# Patient Record
Sex: MALE | Race: ASIAN | NOT HISPANIC OR LATINO | Employment: UNEMPLOYED | URBAN - METROPOLITAN AREA
[De-identification: names, ages, dates, MRNs, and addresses within clinical notes are randomized per-mention and may not be internally consistent; named-entity substitution may affect disease eponyms.]

---

## 2017-04-05 ENCOUNTER — COMMUNICATION - HEALTHEAST (OUTPATIENT)
Dept: FAMILY MEDICINE | Facility: CLINIC | Age: 57
End: 2017-04-05

## 2017-04-05 DIAGNOSIS — I10 ESSENTIAL HYPERTENSION, BENIGN: ICD-10-CM

## 2017-05-04 ENCOUNTER — COMMUNICATION - HEALTHEAST (OUTPATIENT)
Dept: FAMILY MEDICINE | Facility: CLINIC | Age: 57
End: 2017-05-04

## 2017-05-04 DIAGNOSIS — M25.50 PAIN IN JOINT, MULTIPLE SITES: ICD-10-CM

## 2017-05-04 DIAGNOSIS — E55.9 UNSPECIFIED VITAMIN D DEFICIENCY: ICD-10-CM

## 2017-05-04 DIAGNOSIS — I10 ESSENTIAL HYPERTENSION, BENIGN: ICD-10-CM

## 2017-05-04 DIAGNOSIS — M54.9 BACKACHE: ICD-10-CM

## 2017-05-30 ENCOUNTER — OFFICE VISIT - HEALTHEAST (OUTPATIENT)
Dept: FAMILY MEDICINE | Facility: CLINIC | Age: 57
End: 2017-05-30

## 2017-05-30 DIAGNOSIS — G89.29: ICD-10-CM

## 2017-05-30 DIAGNOSIS — Z12.11 SCREENING FOR MALIGNANT NEOPLASM OF COLON: ICD-10-CM

## 2017-05-30 DIAGNOSIS — R73.9 HYPERGLYCEMIA: ICD-10-CM

## 2017-05-30 DIAGNOSIS — I10 ESSENTIAL HYPERTENSION, BENIGN: ICD-10-CM

## 2017-05-30 DIAGNOSIS — M79.606: ICD-10-CM

## 2017-05-30 LAB — HBA1C MFR BLD: 6.4 % (ref 3.5–6)

## 2017-05-30 ASSESSMENT — MIFFLIN-ST. JEOR: SCORE: 1493.92

## 2017-06-05 ENCOUNTER — COMMUNICATION - HEALTHEAST (OUTPATIENT)
Dept: FAMILY MEDICINE | Facility: CLINIC | Age: 57
End: 2017-06-05

## 2017-07-03 ENCOUNTER — COMMUNICATION - HEALTHEAST (OUTPATIENT)
Dept: FAMILY MEDICINE | Facility: CLINIC | Age: 57
End: 2017-07-03

## 2017-07-03 DIAGNOSIS — M79.609 PAIN IN LIMB: ICD-10-CM

## 2017-08-07 ENCOUNTER — COMMUNICATION - HEALTHEAST (OUTPATIENT)
Dept: FAMILY MEDICINE | Facility: CLINIC | Age: 57
End: 2017-08-07

## 2017-08-07 DIAGNOSIS — M79.609 PAIN IN LIMB: ICD-10-CM

## 2017-09-13 ENCOUNTER — OFFICE VISIT - HEALTHEAST (OUTPATIENT)
Dept: FAMILY MEDICINE | Facility: CLINIC | Age: 57
End: 2017-09-13

## 2017-09-13 DIAGNOSIS — M79.609 PAIN IN LIMB: ICD-10-CM

## 2017-09-13 DIAGNOSIS — I10 ESSENTIAL HYPERTENSION, BENIGN: ICD-10-CM

## 2017-09-13 DIAGNOSIS — M54.9 BACKACHE: ICD-10-CM

## 2017-09-13 DIAGNOSIS — E55.9 UNSPECIFIED VITAMIN D DEFICIENCY: ICD-10-CM

## 2017-09-13 DIAGNOSIS — M25.50 PAIN IN JOINT, MULTIPLE SITES: ICD-10-CM

## 2017-11-07 ENCOUNTER — COMMUNICATION - HEALTHEAST (OUTPATIENT)
Dept: FAMILY MEDICINE | Facility: CLINIC | Age: 57
End: 2017-11-07

## 2017-11-07 DIAGNOSIS — M54.9 BACKACHE: ICD-10-CM

## 2017-11-07 DIAGNOSIS — E55.9 VITAMIN D DEFICIENCY: ICD-10-CM

## 2017-11-07 DIAGNOSIS — M25.50 PAIN IN JOINT, MULTIPLE SITES: ICD-10-CM

## 2017-11-29 ENCOUNTER — OFFICE VISIT - HEALTHEAST (OUTPATIENT)
Dept: FAMILY MEDICINE | Facility: CLINIC | Age: 57
End: 2017-11-29

## 2017-11-29 DIAGNOSIS — E55.9 VITAMIN D DEFICIENCY: ICD-10-CM

## 2017-11-29 DIAGNOSIS — M79.605 CHRONIC PAIN OF LEFT LOWER EXTREMITY: ICD-10-CM

## 2017-11-29 DIAGNOSIS — G89.29 CHRONIC PAIN OF LEFT LOWER EXTREMITY: ICD-10-CM

## 2017-11-29 DIAGNOSIS — M25.50 PAIN IN JOINT, MULTIPLE SITES: ICD-10-CM

## 2017-11-29 DIAGNOSIS — I10 ESSENTIAL HYPERTENSION, BENIGN: ICD-10-CM

## 2017-11-29 DIAGNOSIS — Z23 NEED FOR IMMUNIZATION AGAINST INFLUENZA: ICD-10-CM

## 2018-01-15 ENCOUNTER — OFFICE VISIT - HEALTHEAST (OUTPATIENT)
Dept: FAMILY MEDICINE | Facility: CLINIC | Age: 58
End: 2018-01-15

## 2018-01-15 DIAGNOSIS — I10 ESSENTIAL HYPERTENSION, BENIGN: ICD-10-CM

## 2018-01-15 DIAGNOSIS — F79 INTELLECTUAL DISABILITY: ICD-10-CM

## 2018-01-15 DIAGNOSIS — Z87.820 HISTORY OF CLOSED HEAD INJURY: ICD-10-CM

## 2018-06-22 ENCOUNTER — OFFICE VISIT - HEALTHEAST (OUTPATIENT)
Dept: FAMILY MEDICINE | Facility: CLINIC | Age: 58
End: 2018-06-22

## 2018-06-22 DIAGNOSIS — R00.0 TACHYCARDIA: ICD-10-CM

## 2018-06-22 DIAGNOSIS — R06.00 DYSPNEA: ICD-10-CM

## 2018-06-22 DIAGNOSIS — R07.9 ACUTE CHEST PAIN: ICD-10-CM

## 2018-06-22 DIAGNOSIS — M54.9 BACK PAIN: ICD-10-CM

## 2018-06-22 LAB
ATRIAL RATE - MUSE: 123 BPM
DIASTOLIC BLOOD PRESSURE - MUSE: NORMAL MMHG
INTERPRETATION ECG - MUSE: NORMAL
P AXIS - MUSE: 37 DEGREES
PR INTERVAL - MUSE: 156 MS
QRS DURATION - MUSE: 84 MS
QT - MUSE: 298 MS
QTC - MUSE: 426 MS
R AXIS - MUSE: 44 DEGREES
SYSTOLIC BLOOD PRESSURE - MUSE: NORMAL MMHG
T AXIS - MUSE: -10 DEGREES
VENTRICULAR RATE- MUSE: 123 BPM

## 2018-06-22 ASSESSMENT — MIFFLIN-ST. JEOR: SCORE: 1341.51

## 2018-06-23 ASSESSMENT — MIFFLIN-ST. JEOR: SCORE: 1462.62

## 2018-06-24 ASSESSMENT — MIFFLIN-ST. JEOR: SCORE: 1497.1

## 2018-06-25 ASSESSMENT — MIFFLIN-ST. JEOR: SCORE: 1495.28

## 2018-06-26 ENCOUNTER — ANESTHESIA - HEALTHEAST (OUTPATIENT)
Dept: SURGERY | Facility: HOSPITAL | Age: 58
End: 2018-06-26

## 2018-06-26 ENCOUNTER — SURGERY - HEALTHEAST (OUTPATIENT)
Dept: SURGERY | Facility: HOSPITAL | Age: 58
End: 2018-06-26

## 2018-06-26 ASSESSMENT — MIFFLIN-ST. JEOR: SCORE: 1487.12

## 2018-06-27 ASSESSMENT — MIFFLIN-ST. JEOR: SCORE: 1480.32

## 2018-06-28 ENCOUNTER — COMMUNICATION - HEALTHEAST (OUTPATIENT)
Dept: FAMILY MEDICINE | Facility: CLINIC | Age: 58
End: 2018-06-28

## 2018-06-28 ASSESSMENT — MIFFLIN-ST. JEOR: SCORE: 1502.54

## 2018-06-29 ASSESSMENT — MIFFLIN-ST. JEOR: SCORE: 1474.42

## 2018-07-01 ASSESSMENT — MIFFLIN-ST. JEOR: SCORE: 1449.92

## 2018-07-04 ASSESSMENT — MIFFLIN-ST. JEOR: SCORE: 1446.75

## 2018-07-05 ASSESSMENT — MIFFLIN-ST. JEOR: SCORE: 1453.1

## 2018-07-10 ENCOUNTER — COMMUNICATION - HEALTHEAST (OUTPATIENT)
Dept: ADMINISTRATIVE | Facility: CLINIC | Age: 58
End: 2018-07-10

## 2018-07-11 ENCOUNTER — OFFICE VISIT - HEALTHEAST (OUTPATIENT)
Dept: FAMILY MEDICINE | Facility: CLINIC | Age: 58
End: 2018-07-11

## 2018-07-11 DIAGNOSIS — J86.9 EMPYEMA, RIGHT (H): ICD-10-CM

## 2018-07-11 DIAGNOSIS — M79.605 CHRONIC PAIN OF LEFT LOWER EXTREMITY: ICD-10-CM

## 2018-07-11 DIAGNOSIS — G89.29 CHRONIC PAIN OF LEFT LOWER EXTREMITY: ICD-10-CM

## 2018-07-11 DIAGNOSIS — R07.81 PLEURITIC CHEST PAIN: ICD-10-CM

## 2018-07-11 DIAGNOSIS — E11.8 TYPE 2 DIABETES MELLITUS WITH COMPLICATION, WITHOUT LONG-TERM CURRENT USE OF INSULIN (H): ICD-10-CM

## 2018-07-11 DIAGNOSIS — D50.9 MICROCYTIC ANEMIA: ICD-10-CM

## 2018-07-18 ENCOUNTER — RECORDS - HEALTHEAST (OUTPATIENT)
Dept: ADMINISTRATIVE | Facility: OTHER | Age: 58
End: 2018-07-18

## 2018-08-02 ENCOUNTER — OFFICE VISIT - HEALTHEAST (OUTPATIENT)
Dept: INFECTIOUS DISEASES | Facility: CLINIC | Age: 58
End: 2018-08-02

## 2018-08-02 DIAGNOSIS — J86.9 EMPYEMA (H): ICD-10-CM

## 2018-08-02 LAB
BASOPHILS # BLD AUTO: 0 THOU/UL (ref 0–0.2)
BASOPHILS NFR BLD AUTO: 0 % (ref 0–2)
EOSINOPHIL # BLD AUTO: 0.1 THOU/UL (ref 0–0.4)
EOSINOPHIL NFR BLD AUTO: 0 % (ref 0–6)
ERYTHROCYTE [DISTWIDTH] IN BLOOD BY AUTOMATED COUNT: 20.6 % (ref 11–14.5)
HCT VFR BLD AUTO: 39.6 % (ref 40–54)
HGB BLD-MCNC: 12.1 G/DL (ref 14–18)
LYMPHOCYTES # BLD AUTO: 1.9 THOU/UL (ref 0.8–4.4)
LYMPHOCYTES NFR BLD AUTO: 15 % (ref 20–40)
MCH RBC QN AUTO: 22.1 PG (ref 27–34)
MCHC RBC AUTO-ENTMCNC: 30.6 G/DL (ref 32–36)
MCV RBC AUTO: 72 FL (ref 80–100)
MONOCYTES # BLD AUTO: 1.2 THOU/UL (ref 0–0.9)
MONOCYTES NFR BLD AUTO: 10 % (ref 2–10)
NEUTROPHILS # BLD AUTO: 9.3 THOU/UL (ref 2–7.7)
NEUTROPHILS NFR BLD AUTO: 75 % (ref 50–70)
PLAT MORPH BLD: NORMAL
PLATELET # BLD AUTO: 203 THOU/UL (ref 140–440)
PMV BLD AUTO: ABNORMAL FL (ref 8.5–12.5)
POLYCHROMASIA BLD QL SMEAR: ABNORMAL
RBC # BLD AUTO: 5.47 MILL/UL (ref 4.4–6.2)
WBC: 12.5 THOU/UL (ref 4–11)

## 2018-08-22 ENCOUNTER — OFFICE VISIT - HEALTHEAST (OUTPATIENT)
Dept: FAMILY MEDICINE | Facility: CLINIC | Age: 58
End: 2018-08-22

## 2018-08-22 DIAGNOSIS — M25.50 PAIN IN JOINT, MULTIPLE SITES: ICD-10-CM

## 2018-08-22 DIAGNOSIS — J86.9 EMPYEMA, RIGHT (H): ICD-10-CM

## 2018-08-22 DIAGNOSIS — E11.8 TYPE 2 DIABETES MELLITUS WITH COMPLICATION, WITHOUT LONG-TERM CURRENT USE OF INSULIN (H): ICD-10-CM

## 2018-08-22 DIAGNOSIS — D62 ANEMIA DUE TO BLOOD LOSS, ACUTE: ICD-10-CM

## 2018-08-22 DIAGNOSIS — Z12.11 SCREENING FOR COLON CANCER: ICD-10-CM

## 2018-08-22 DIAGNOSIS — M79.605 CHRONIC PAIN OF LEFT LOWER EXTREMITY: ICD-10-CM

## 2018-08-22 DIAGNOSIS — I10 ESSENTIAL HYPERTENSION, BENIGN: ICD-10-CM

## 2018-08-22 DIAGNOSIS — G89.29 CHRONIC PAIN OF LEFT LOWER EXTREMITY: ICD-10-CM

## 2018-08-22 ASSESSMENT — MIFFLIN-ST. JEOR: SCORE: 1425.88

## 2018-10-22 ENCOUNTER — OFFICE VISIT - HEALTHEAST (OUTPATIENT)
Dept: FAMILY MEDICINE | Facility: CLINIC | Age: 58
End: 2018-10-22

## 2018-10-22 DIAGNOSIS — G54.6 PHANTOM LIMB PAIN (H): ICD-10-CM

## 2018-10-22 DIAGNOSIS — D50.9 MICROCYTIC ANEMIA: ICD-10-CM

## 2018-10-22 DIAGNOSIS — Z23 NEED FOR IMMUNIZATION AGAINST INFLUENZA: ICD-10-CM

## 2018-10-22 DIAGNOSIS — E11.8 TYPE 2 DIABETES MELLITUS WITH COMPLICATION, WITHOUT LONG-TERM CURRENT USE OF INSULIN (H): ICD-10-CM

## 2018-10-22 DIAGNOSIS — I10 ESSENTIAL HYPERTENSION, BENIGN: ICD-10-CM

## 2018-10-22 LAB
ERYTHROCYTE [DISTWIDTH] IN BLOOD BY AUTOMATED COUNT: 18.7 % (ref 11–14.5)
HBA1C MFR BLD: 6.6 % (ref 3.5–6)
HCT VFR BLD AUTO: 44.1 % (ref 40–54)
HGB BLD-MCNC: 13.2 G/DL (ref 14–18)
MCH RBC QN AUTO: 22.2 PG (ref 27–34)
MCHC RBC AUTO-ENTMCNC: 29.9 G/DL (ref 32–36)
MCV RBC AUTO: 74 FL (ref 80–100)
PLATELET # BLD AUTO: 133 THOU/UL (ref 140–440)
RBC # BLD AUTO: 5.94 MILL/UL (ref 4.4–6.2)
WBC: 8.1 THOU/UL (ref 4–11)

## 2018-12-01 ENCOUNTER — COMMUNICATION - HEALTHEAST (OUTPATIENT)
Dept: FAMILY MEDICINE | Facility: CLINIC | Age: 58
End: 2018-12-01

## 2018-12-01 DIAGNOSIS — I10 ESSENTIAL HYPERTENSION, BENIGN: ICD-10-CM

## 2018-12-01 DIAGNOSIS — G89.29 CHRONIC PAIN OF LEFT LOWER EXTREMITY: ICD-10-CM

## 2018-12-01 DIAGNOSIS — M79.605 CHRONIC PAIN OF LEFT LOWER EXTREMITY: ICD-10-CM

## 2018-12-01 DIAGNOSIS — M25.50 PAIN IN JOINT, MULTIPLE SITES: ICD-10-CM

## 2018-12-04 ENCOUNTER — OFFICE VISIT - HEALTHEAST (OUTPATIENT)
Dept: FAMILY MEDICINE | Facility: CLINIC | Age: 58
End: 2018-12-04

## 2018-12-04 DIAGNOSIS — M79.605 CHRONIC PAIN OF LEFT LOWER EXTREMITY: ICD-10-CM

## 2018-12-04 DIAGNOSIS — M25.50 PAIN IN JOINT, MULTIPLE SITES: ICD-10-CM

## 2018-12-04 DIAGNOSIS — E11.8 TYPE 2 DIABETES MELLITUS WITH COMPLICATION, WITHOUT LONG-TERM CURRENT USE OF INSULIN (H): ICD-10-CM

## 2018-12-04 DIAGNOSIS — R07.9 RIGHT-SIDED CHEST PAIN: ICD-10-CM

## 2018-12-04 DIAGNOSIS — G89.29 CHRONIC PAIN OF LEFT LOWER EXTREMITY: ICD-10-CM

## 2018-12-04 DIAGNOSIS — E55.9 VITAMIN D DEFICIENCY: ICD-10-CM

## 2018-12-04 DIAGNOSIS — I10 ESSENTIAL HYPERTENSION, BENIGN: ICD-10-CM

## 2018-12-04 ASSESSMENT — MIFFLIN-ST. JEOR: SCORE: 1466.71

## 2019-02-19 ENCOUNTER — RECORDS - HEALTHEAST (OUTPATIENT)
Dept: ADMINISTRATIVE | Facility: OTHER | Age: 59
End: 2019-02-19

## 2019-03-01 ENCOUNTER — RECORDS - HEALTHEAST (OUTPATIENT)
Dept: HEALTH INFORMATION MANAGEMENT | Facility: CLINIC | Age: 59
End: 2019-03-01

## 2019-04-08 ENCOUNTER — OFFICE VISIT - HEALTHEAST (OUTPATIENT)
Dept: FAMILY MEDICINE | Facility: CLINIC | Age: 59
End: 2019-04-08

## 2019-04-08 DIAGNOSIS — E55.9 VITAMIN D DEFICIENCY: ICD-10-CM

## 2019-04-08 DIAGNOSIS — I10 ESSENTIAL HYPERTENSION, BENIGN: ICD-10-CM

## 2019-04-08 DIAGNOSIS — L02.11 CUTANEOUS ABSCESS OF NECK: ICD-10-CM

## 2019-04-08 DIAGNOSIS — E11.8 TYPE 2 DIABETES MELLITUS WITH COMPLICATION, WITHOUT LONG-TERM CURRENT USE OF INSULIN (H): ICD-10-CM

## 2019-04-08 LAB
ANION GAP SERPL CALCULATED.3IONS-SCNC: 13 MMOL/L (ref 5–18)
BUN SERPL-MCNC: 12 MG/DL (ref 8–22)
CALCIUM SERPL-MCNC: 9.8 MG/DL (ref 8.5–10.5)
CHLORIDE BLD-SCNC: 106 MMOL/L (ref 98–107)
CO2 SERPL-SCNC: 23 MMOL/L (ref 22–31)
CREAT SERPL-MCNC: 1.08 MG/DL (ref 0.7–1.3)
GFR SERPL CREATININE-BSD FRML MDRD: >60 ML/MIN/1.73M2
GLUCOSE BLD-MCNC: 132 MG/DL (ref 70–125)
HBA1C MFR BLD: 6.1 % (ref 3.5–6)
POTASSIUM BLD-SCNC: 4.1 MMOL/L (ref 3.5–5)
SODIUM SERPL-SCNC: 142 MMOL/L (ref 136–145)

## 2019-05-08 ENCOUNTER — COMMUNICATION - HEALTHEAST (OUTPATIENT)
Dept: FAMILY MEDICINE | Facility: CLINIC | Age: 59
End: 2019-05-08

## 2019-05-08 DIAGNOSIS — G89.29 CHRONIC PAIN OF LEFT LOWER EXTREMITY: ICD-10-CM

## 2019-05-08 DIAGNOSIS — M25.50 PAIN IN JOINT, MULTIPLE SITES: ICD-10-CM

## 2019-05-08 DIAGNOSIS — M79.605 CHRONIC PAIN OF LEFT LOWER EXTREMITY: ICD-10-CM

## 2019-06-11 ENCOUNTER — OFFICE VISIT - HEALTHEAST (OUTPATIENT)
Dept: FAMILY MEDICINE | Facility: CLINIC | Age: 59
End: 2019-06-11

## 2019-06-11 DIAGNOSIS — G54.6 PHANTOM LIMB PAIN (H): ICD-10-CM

## 2019-06-11 DIAGNOSIS — E11.8 TYPE 2 DIABETES MELLITUS WITH COMPLICATION, WITHOUT LONG-TERM CURRENT USE OF INSULIN (H): ICD-10-CM

## 2019-06-11 DIAGNOSIS — I10 ESSENTIAL HYPERTENSION, BENIGN: ICD-10-CM

## 2019-06-11 ASSESSMENT — MIFFLIN-ST. JEOR: SCORE: 1480.32

## 2019-07-09 ENCOUNTER — OFFICE VISIT - HEALTHEAST (OUTPATIENT)
Dept: FAMILY MEDICINE | Facility: CLINIC | Age: 59
End: 2019-07-09

## 2019-07-09 DIAGNOSIS — I10 ESSENTIAL HYPERTENSION, BENIGN: ICD-10-CM

## 2019-07-09 LAB
CREAT UR-MCNC: 9.6 MG/DL
MICROALBUMIN UR-MCNC: <0.5 MG/DL (ref 0–1.99)
MICROALBUMIN/CREAT UR: NORMAL MG/G

## 2019-07-30 ENCOUNTER — OFFICE VISIT - HEALTHEAST (OUTPATIENT)
Dept: FAMILY MEDICINE | Facility: CLINIC | Age: 59
End: 2019-07-30

## 2019-07-30 DIAGNOSIS — E11.8 TYPE 2 DIABETES MELLITUS WITH COMPLICATION, WITHOUT LONG-TERM CURRENT USE OF INSULIN (H): ICD-10-CM

## 2019-07-30 DIAGNOSIS — I10 ESSENTIAL HYPERTENSION, BENIGN: ICD-10-CM

## 2019-07-30 DIAGNOSIS — I10 UNCONTROLLED HYPERTENSION: ICD-10-CM

## 2019-07-30 LAB
ANION GAP SERPL CALCULATED.3IONS-SCNC: 10 MMOL/L (ref 5–18)
BUN SERPL-MCNC: 8 MG/DL (ref 8–22)
CALCIUM SERPL-MCNC: 9.7 MG/DL (ref 8.5–10.5)
CHLORIDE BLD-SCNC: 107 MMOL/L (ref 98–107)
CO2 SERPL-SCNC: 25 MMOL/L (ref 22–31)
CREAT SERPL-MCNC: 0.86 MG/DL (ref 0.7–1.3)
GFR SERPL CREATININE-BSD FRML MDRD: >60 ML/MIN/1.73M2
GLUCOSE BLD-MCNC: 102 MG/DL (ref 70–125)
POTASSIUM BLD-SCNC: 3.8 MMOL/L (ref 3.5–5)
SODIUM SERPL-SCNC: 142 MMOL/L (ref 136–145)

## 2019-07-31 LAB — HBA1C MFR BLD: 6 % (ref 3.5–6)

## 2019-08-02 LAB
ANNOTATION COMMENT IMP: NORMAL
METANEPHS SERPL-SCNC: 0.2 NMOL/L (ref 0–0.49)
NORMETANEPHRINE SERPL-SCNC: 0.73 NMOL/L (ref 0–0.89)

## 2019-08-14 ENCOUNTER — RECORDS - HEALTHEAST (OUTPATIENT)
Dept: ADMINISTRATIVE | Facility: OTHER | Age: 59
End: 2019-08-14

## 2019-08-15 ENCOUNTER — OFFICE VISIT - HEALTHEAST (OUTPATIENT)
Dept: FAMILY MEDICINE | Facility: CLINIC | Age: 59
End: 2019-08-15

## 2019-08-15 DIAGNOSIS — I10 UNCONTROLLED HYPERTENSION: ICD-10-CM

## 2019-08-15 ASSESSMENT — MIFFLIN-ST. JEOR: SCORE: 1490.52

## 2019-09-09 ENCOUNTER — COMMUNICATION - HEALTHEAST (OUTPATIENT)
Dept: FAMILY MEDICINE | Facility: CLINIC | Age: 59
End: 2019-09-09

## 2019-09-09 DIAGNOSIS — G89.29 CHRONIC PAIN OF LEFT LOWER EXTREMITY: ICD-10-CM

## 2019-09-09 DIAGNOSIS — M79.605 CHRONIC PAIN OF LEFT LOWER EXTREMITY: ICD-10-CM

## 2019-09-09 DIAGNOSIS — M25.50 PAIN IN JOINT, MULTIPLE SITES: ICD-10-CM

## 2019-09-26 ENCOUNTER — OFFICE VISIT - HEALTHEAST (OUTPATIENT)
Dept: FAMILY MEDICINE | Facility: CLINIC | Age: 59
End: 2019-09-26

## 2019-09-26 DIAGNOSIS — E55.9 VITAMIN D DEFICIENCY: ICD-10-CM

## 2019-09-26 DIAGNOSIS — Z23 NEED FOR IMMUNIZATION AGAINST INFLUENZA: ICD-10-CM

## 2019-09-26 DIAGNOSIS — I10 ESSENTIAL HYPERTENSION, BENIGN: ICD-10-CM

## 2019-09-26 DIAGNOSIS — E11.8 TYPE 2 DIABETES MELLITUS WITH COMPLICATION, WITHOUT LONG-TERM CURRENT USE OF INSULIN (H): ICD-10-CM

## 2019-09-26 ASSESSMENT — MIFFLIN-ST. JEOR: SCORE: 1480.32

## 2019-11-05 ENCOUNTER — OFFICE VISIT - HEALTHEAST (OUTPATIENT)
Dept: FAMILY MEDICINE | Facility: CLINIC | Age: 59
End: 2019-11-05

## 2019-11-05 DIAGNOSIS — I1A.0 RESISTANT HYPERTENSION: ICD-10-CM

## 2019-11-13 ENCOUNTER — COMMUNICATION - HEALTHEAST (OUTPATIENT)
Dept: FAMILY MEDICINE | Facility: CLINIC | Age: 59
End: 2019-11-13

## 2019-12-05 ENCOUNTER — OFFICE VISIT - HEALTHEAST (OUTPATIENT)
Dept: FAMILY MEDICINE | Facility: CLINIC | Age: 59
End: 2019-12-05

## 2019-12-05 DIAGNOSIS — I1A.0 RESISTANT HYPERTENSION: ICD-10-CM

## 2019-12-05 DIAGNOSIS — M25.50 PAIN IN JOINT, MULTIPLE SITES: ICD-10-CM

## 2019-12-05 DIAGNOSIS — G89.29 CHRONIC PAIN OF LEFT LOWER EXTREMITY: ICD-10-CM

## 2019-12-05 DIAGNOSIS — M79.605 CHRONIC PAIN OF LEFT LOWER EXTREMITY: ICD-10-CM

## 2019-12-05 ASSESSMENT — MIFFLIN-ST. JEOR: SCORE: 1505.26

## 2019-12-12 ENCOUNTER — OFFICE VISIT - HEALTHEAST (OUTPATIENT)
Dept: FAMILY MEDICINE | Facility: CLINIC | Age: 59
End: 2019-12-12

## 2019-12-12 DIAGNOSIS — I1A.0 RESISTANT HYPERTENSION: ICD-10-CM

## 2019-12-12 ASSESSMENT — MIFFLIN-ST. JEOR: SCORE: 1502.99

## 2019-12-17 ENCOUNTER — OFFICE VISIT - HEALTHEAST (OUTPATIENT)
Dept: FAMILY MEDICINE | Facility: CLINIC | Age: 59
End: 2019-12-17

## 2019-12-17 DIAGNOSIS — R00.0 SINUS TACHYCARDIA: ICD-10-CM

## 2019-12-17 DIAGNOSIS — I1A.0 RESISTANT HYPERTENSION: ICD-10-CM

## 2019-12-17 DIAGNOSIS — E11.9 DIABETES MELLITUS, TYPE 2 (H): ICD-10-CM

## 2019-12-17 LAB — HBA1C MFR BLD: 6.2 % (ref 3.5–6)

## 2019-12-19 LAB
ANNOTATION COMMENT IMP: ABNORMAL
METANEPHS SERPL-SCNC: 0.45 NMOL/L (ref 0–0.49)
NORMETANEPHRINE SERPL-SCNC: 1.82 NMOL/L (ref 0–0.89)

## 2020-03-18 ENCOUNTER — COMMUNICATION - HEALTHEAST (OUTPATIENT)
Dept: FAMILY MEDICINE | Facility: CLINIC | Age: 60
End: 2020-03-18

## 2020-03-19 ENCOUNTER — COMMUNICATION - HEALTHEAST (OUTPATIENT)
Dept: FAMILY MEDICINE | Facility: CLINIC | Age: 60
End: 2020-03-19

## 2020-03-19 DIAGNOSIS — G89.29 CHRONIC PAIN OF LEFT LOWER EXTREMITY: ICD-10-CM

## 2020-03-19 DIAGNOSIS — M25.50 PAIN IN JOINT, MULTIPLE SITES: ICD-10-CM

## 2020-03-19 DIAGNOSIS — M79.605 CHRONIC PAIN OF LEFT LOWER EXTREMITY: ICD-10-CM

## 2020-03-25 ENCOUNTER — COMMUNICATION - HEALTHEAST (OUTPATIENT)
Dept: FAMILY MEDICINE | Facility: CLINIC | Age: 60
End: 2020-03-25

## 2020-03-25 DIAGNOSIS — G89.29 CHRONIC PAIN OF LEFT LOWER EXTREMITY: ICD-10-CM

## 2020-03-25 DIAGNOSIS — M79.605 CHRONIC PAIN OF LEFT LOWER EXTREMITY: ICD-10-CM

## 2020-03-25 DIAGNOSIS — M25.50 PAIN IN JOINT, MULTIPLE SITES: ICD-10-CM

## 2020-03-25 RX ORDER — ACETAMINOPHEN 500 MG
500 TABLET ORAL EVERY 6 HOURS PRN
Qty: 100 TABLET | Refills: 11 | Status: SHIPPED | OUTPATIENT
Start: 2020-03-25 | End: 2024-02-12

## 2020-05-06 ENCOUNTER — COMMUNICATION - HEALTHEAST (OUTPATIENT)
Dept: FAMILY MEDICINE | Facility: CLINIC | Age: 60
End: 2020-05-06

## 2020-05-06 DIAGNOSIS — I10 ESSENTIAL HYPERTENSION, BENIGN: ICD-10-CM

## 2020-05-11 ENCOUNTER — COMMUNICATION - HEALTHEAST (OUTPATIENT)
Dept: FAMILY MEDICINE | Facility: CLINIC | Age: 60
End: 2020-05-11

## 2020-05-27 ENCOUNTER — OFFICE VISIT - HEALTHEAST (OUTPATIENT)
Dept: FAMILY MEDICINE | Facility: CLINIC | Age: 60
End: 2020-05-27

## 2020-05-27 DIAGNOSIS — I1A.0 RESISTANT HYPERTENSION: ICD-10-CM

## 2020-05-27 DIAGNOSIS — E55.9 VITAMIN D DEFICIENCY: ICD-10-CM

## 2020-05-27 DIAGNOSIS — M79.605 CHRONIC PAIN OF LEFT LOWER EXTREMITY: ICD-10-CM

## 2020-05-27 DIAGNOSIS — I10 ESSENTIAL HYPERTENSION, BENIGN: ICD-10-CM

## 2020-05-27 DIAGNOSIS — R00.0 SINUS TACHYCARDIA: ICD-10-CM

## 2020-05-27 DIAGNOSIS — M25.50 PAIN IN JOINT, MULTIPLE SITES: ICD-10-CM

## 2020-05-27 DIAGNOSIS — G89.29 CHRONIC PAIN OF LEFT LOWER EXTREMITY: ICD-10-CM

## 2020-05-27 DIAGNOSIS — E11.8 TYPE 2 DIABETES MELLITUS WITH COMPLICATION, WITHOUT LONG-TERM CURRENT USE OF INSULIN (H): ICD-10-CM

## 2020-05-27 LAB
ALBUMIN SERPL-MCNC: 4.2 G/DL (ref 3.5–5)
ALP SERPL-CCNC: 92 U/L (ref 45–120)
ALT SERPL W P-5'-P-CCNC: 34 U/L (ref 0–45)
ANION GAP SERPL CALCULATED.3IONS-SCNC: 12 MMOL/L (ref 5–18)
AST SERPL W P-5'-P-CCNC: 34 U/L (ref 0–40)
BILIRUB SERPL-MCNC: 0.7 MG/DL (ref 0–1)
BUN SERPL-MCNC: 9 MG/DL (ref 8–22)
CALCIUM SERPL-MCNC: 10.1 MG/DL (ref 8.5–10.5)
CHLORIDE BLD-SCNC: 101 MMOL/L (ref 98–107)
CO2 SERPL-SCNC: 27 MMOL/L (ref 22–31)
CREAT SERPL-MCNC: 1 MG/DL (ref 0.7–1.3)
GFR SERPL CREATININE-BSD FRML MDRD: >60 ML/MIN/1.73M2
GLUCOSE BLD-MCNC: 236 MG/DL (ref 70–125)
HBA1C MFR BLD: 7 % (ref 3.5–6)
LDLC SERPL CALC-MCNC: 253 MG/DL
POTASSIUM BLD-SCNC: 4.5 MMOL/L (ref 3.5–5)
PROT SERPL-MCNC: 8.1 G/DL (ref 6–8)
SODIUM SERPL-SCNC: 140 MMOL/L (ref 136–145)

## 2020-05-27 RX ORDER — AMLODIPINE BESYLATE 10 MG/1
10 TABLET ORAL DAILY
Qty: 90 TABLET | Refills: 3 | Status: SHIPPED | OUTPATIENT
Start: 2020-05-27 | End: 2022-07-11

## 2020-05-27 RX ORDER — GABAPENTIN 300 MG/1
300 CAPSULE ORAL 2 TIMES DAILY
Qty: 180 CAPSULE | Refills: 3 | Status: SHIPPED | OUTPATIENT
Start: 2020-05-27 | End: 2021-09-08

## 2020-05-27 RX ORDER — LOSARTAN POTASSIUM 100 MG/1
100 TABLET ORAL DAILY
Qty: 90 TABLET | Refills: 3 | Status: SHIPPED | OUTPATIENT
Start: 2020-05-27 | End: 2022-07-11

## 2020-05-30 LAB
ANNOTATION COMMENT IMP: ABNORMAL
METANEPHS SERPL-SCNC: 0.32 NMOL/L (ref 0–0.49)
NORMETANEPHRINE SERPL-SCNC: 1.15 NMOL/L (ref 0–0.89)

## 2020-06-03 ENCOUNTER — COMMUNICATION - HEALTHEAST (OUTPATIENT)
Dept: FAMILY MEDICINE | Facility: CLINIC | Age: 60
End: 2020-06-03

## 2020-06-03 ENCOUNTER — AMBULATORY - HEALTHEAST (OUTPATIENT)
Dept: FAMILY MEDICINE | Facility: CLINIC | Age: 60
End: 2020-06-03

## 2020-06-03 DIAGNOSIS — R79.89 ELEVATED PLASMA METANEPHRINES: ICD-10-CM

## 2020-06-30 ENCOUNTER — COMMUNICATION - HEALTHEAST (OUTPATIENT)
Dept: ADMINISTRATIVE | Facility: CLINIC | Age: 60
End: 2020-06-30

## 2020-09-08 ENCOUNTER — COMMUNICATION - HEALTHEAST (OUTPATIENT)
Dept: FAMILY MEDICINE | Facility: CLINIC | Age: 60
End: 2020-09-08

## 2020-12-21 ENCOUNTER — AMBULATORY - HEALTHEAST (OUTPATIENT)
Dept: FAMILY MEDICINE | Facility: CLINIC | Age: 60
End: 2020-12-21

## 2020-12-21 DIAGNOSIS — I1A.0 RESISTANT HYPERTENSION: ICD-10-CM

## 2020-12-22 ENCOUNTER — COMMUNICATION - HEALTHEAST (OUTPATIENT)
Dept: NURSING | Facility: CLINIC | Age: 60
End: 2020-12-22

## 2020-12-23 ENCOUNTER — COMMUNICATION - HEALTHEAST (OUTPATIENT)
Dept: NURSING | Facility: CLINIC | Age: 60
End: 2020-12-23

## 2020-12-24 ENCOUNTER — OFFICE VISIT - HEALTHEAST (OUTPATIENT)
Dept: FAMILY MEDICINE | Facility: CLINIC | Age: 60
End: 2020-12-24

## 2020-12-24 DIAGNOSIS — E78.00 HYPERCHOLESTEREMIA: ICD-10-CM

## 2020-12-24 DIAGNOSIS — R00.0 TACHYCARDIA: ICD-10-CM

## 2020-12-24 DIAGNOSIS — R79.89 ELEVATED PLASMA METANEPHRINES: ICD-10-CM

## 2020-12-24 DIAGNOSIS — E11.8 TYPE 2 DIABETES MELLITUS WITH COMPLICATION, WITHOUT LONG-TERM CURRENT USE OF INSULIN (H): ICD-10-CM

## 2020-12-24 DIAGNOSIS — I1A.0 RESISTANT HYPERTENSION: ICD-10-CM

## 2020-12-24 LAB
ANION GAP SERPL CALCULATED.3IONS-SCNC: 12 MMOL/L (ref 5–18)
BUN SERPL-MCNC: 11 MG/DL (ref 8–22)
CALCIUM SERPL-MCNC: 9.6 MG/DL (ref 8.5–10.5)
CHLORIDE BLD-SCNC: 102 MMOL/L (ref 98–107)
CO2 SERPL-SCNC: 25 MMOL/L (ref 22–31)
CREAT SERPL-MCNC: 0.89 MG/DL (ref 0.7–1.3)
GFR SERPL CREATININE-BSD FRML MDRD: >60 ML/MIN/1.73M2
GLUCOSE BLD-MCNC: 153 MG/DL (ref 70–125)
HBA1C MFR BLD: 6.7 %
POTASSIUM BLD-SCNC: 3.7 MMOL/L (ref 3.5–5)
SODIUM SERPL-SCNC: 139 MMOL/L (ref 136–145)

## 2020-12-24 RX ORDER — EZETIMIBE 10 MG/1
10 TABLET ORAL DAILY
Qty: 30 TABLET | Refills: 11 | Status: SHIPPED | OUTPATIENT
Start: 2020-12-24 | End: 2021-09-08

## 2020-12-24 RX ORDER — ATENOLOL 100 MG/1
100 TABLET ORAL DAILY
Qty: 90 TABLET | Refills: 3 | Status: SHIPPED | OUTPATIENT
Start: 2020-12-24 | End: 2021-09-08

## 2020-12-24 ASSESSMENT — MIFFLIN-ST. JEOR: SCORE: 1493.46

## 2021-01-06 ENCOUNTER — COMMUNICATION - HEALTHEAST (OUTPATIENT)
Dept: NURSING | Facility: CLINIC | Age: 61
End: 2021-01-06

## 2021-01-06 ASSESSMENT — ACTIVITIES OF DAILY LIVING (ADL)
DEPENDENT_IADLS:: CLEANING;COOKING;LAUNDRY;SHOPPING;MEAL PREPARATION;MEDICATION MANAGEMENT;MONEY MANAGEMENT;TRANSPORTATION

## 2021-01-12 ENCOUNTER — COMMUNICATION - HEALTHEAST (OUTPATIENT)
Dept: NURSING | Facility: CLINIC | Age: 61
End: 2021-01-12

## 2021-01-12 ENCOUNTER — HOSPITAL ENCOUNTER (OUTPATIENT)
Dept: CT IMAGING | Facility: HOSPITAL | Age: 61
Discharge: HOME | End: 2021-01-12
Attending: FAMILY MEDICINE

## 2021-01-12 DIAGNOSIS — I1A.0 RESISTANT HYPERTENSION: ICD-10-CM

## 2021-01-12 DIAGNOSIS — R79.89 ELEVATED PLASMA METANEPHRINES: ICD-10-CM

## 2021-02-10 ENCOUNTER — COMMUNICATION - HEALTHEAST (OUTPATIENT)
Dept: NURSING | Facility: CLINIC | Age: 61
End: 2021-02-10

## 2021-02-25 ENCOUNTER — COMMUNICATION - HEALTHEAST (OUTPATIENT)
Dept: NURSING | Facility: CLINIC | Age: 61
End: 2021-02-25

## 2021-03-11 ENCOUNTER — OFFICE VISIT - HEALTHEAST (OUTPATIENT)
Dept: FAMILY MEDICINE | Facility: CLINIC | Age: 61
End: 2021-03-11

## 2021-03-11 DIAGNOSIS — G54.6 PHANTOM LIMB PAIN (H): ICD-10-CM

## 2021-03-11 DIAGNOSIS — E11.8 TYPE 2 DIABETES MELLITUS WITH COMPLICATION, WITHOUT LONG-TERM CURRENT USE OF INSULIN (H): ICD-10-CM

## 2021-03-11 DIAGNOSIS — I1A.0 RESISTANT HYPERTENSION: ICD-10-CM

## 2021-03-11 ASSESSMENT — MIFFLIN-ST. JEOR: SCORE: 1497.99

## 2021-03-24 ENCOUNTER — COMMUNICATION - HEALTHEAST (OUTPATIENT)
Dept: NURSING | Facility: CLINIC | Age: 61
End: 2021-03-24

## 2021-04-01 ENCOUNTER — COMMUNICATION - HEALTHEAST (OUTPATIENT)
Dept: NURSING | Facility: CLINIC | Age: 61
End: 2021-04-01

## 2021-04-06 ENCOUNTER — COMMUNICATION - HEALTHEAST (OUTPATIENT)
Dept: FAMILY MEDICINE | Facility: CLINIC | Age: 61
End: 2021-04-06

## 2021-05-18 ENCOUNTER — OFFICE VISIT - HEALTHEAST (OUTPATIENT)
Dept: FAMILY MEDICINE | Facility: CLINIC | Age: 61
End: 2021-05-18

## 2021-05-18 DIAGNOSIS — R00.0 SINUS TACHYCARDIA: ICD-10-CM

## 2021-05-18 DIAGNOSIS — I1A.0 RESISTANT HYPERTENSION: ICD-10-CM

## 2021-05-18 DIAGNOSIS — E11.9 DIABETES MELLITUS, TYPE 2 (H): ICD-10-CM

## 2021-05-18 LAB
HBA1C MFR BLD: 7 %
TSH SERPL DL<=0.005 MIU/L-ACNC: 0.56 UIU/ML (ref 0.3–5)

## 2021-05-27 NOTE — PROGRESS NOTES
ASSESMENT AND PLAN:  Diagnoses and all orders for this visit:    Type 2 diabetes mellitus with complication, without long-term current use of insulin (H)  -     Glycosylated Hemoglobin A1c done today shows improvement trend, under excellent control.  Counseling done, continue current treatment plan, recheck again in 4 months.    Benign Essential Hypertension  Not under ideal control.  Will check labs as ordered below and increase the losartan from 50 mg daily up to 100 mg daily.  Reviewed the risks and benefits of the medication change with the patient and his family with the help of a professional .  Recheck BP here in the clinic with me at the follow-up visit in 2 months, sooner if problems.  -     Basic Metabolic Panel  -     losartan (COZAAR) 100 MG tablet  Dispense: 90 tablet; Refill: 3    Vitamin D deficiency  -     cholecalciferol, vitamin D3, (VITAMIN D3) 2,000 unit capsule  Dispense: 90 capsule; Refill: 3    Cutaneous abscess of neck  Patient self treated as detailed below and it looks like the area has almost resolved.  I gave him some bacitracin ointment to apply 3 times a day and we discussed indications for follow-up.        SUBJECTIVE: 58-year-old male here for follow-up on hyper pressure and diabetes.  He is been taking his medications regularly.  He has not noticed any side effects or problems.  He had a red swollen painful area on the right posterior neck about a week ago.  It increased in size and he drained it himself.  It produced a large amount of pus.  Since then the redness and swelling have gone down and the pain has resolved.  No fevers or chills.  Patient has a history of vitamin D deficiency, ran out of vitamin D.  He is been doing okay with all of his other medications.  Patient has chronic chest pain, previously see my previous note for details.  Other than that chronic chest pain, he has not had any new or changing chest pain issues.    Past Medical History:   Diagnosis Date      History of transfusion      Patient Active Problem List   Diagnosis     Hypercholesterolemia     Chest Pain     Traumatic Amputation Of One Leg At/Above The Knee     Post-traumatic Stress Disorder     Myalgia And Myositis     Vitamin D Deficiency     Benign Essential Hypertension     Arthralgias In Multiple Sites     Back Pain     Insomnia     Esophageal Reflux     Limb Pain     Elevated liver enzymes     Chronic lower limb pain     Chronic pain of left lower extremity     Intellectual disability     History of closed head injury     Pleural effusion     Sepsis (H)     Hyponatremia     Empyema, right (H)     TY (acute kidney injury) (H)     Microcytic anemia     Generalized muscle weakness     Anemia due to blood loss, acute     Right-sided chest pain     Type 2 diabetes mellitus with complication, without long-term current use of insulin (H)     Phantom limb pain (H)     Current Outpatient Medications   Medication Sig Dispense Refill     acetaminophen (TYLENOL) 500 MG tablet Take 1 tablet (500 mg total) by mouth every 6 (six) hours as needed for pain TAKE ONE OR TWO TABLETS BY MOUTH EVERY SIX HOURS AS NEEDED. 100 tablet 11     capsaicin (ZOSTRIX) 0.025 % cream Apply 1 application topically 2 (two) times a day as needed. 60 g 11     gabapentin (NEURONTIN) 300 MG capsule Take 1 capsule (300 mg total) by mouth 2 (two) times a day. 180 capsule 3     losartan (COZAAR) 100 MG tablet Take 1 tablet (100 mg total) by mouth daily. TAKE 1 TABLET(50 MG) BY MOUTH DAILY 90 tablet 3     metFORMIN (GLUCOPHAGE) 500 MG tablet Take 1 tablet (500 mg total) by mouth 2 (two) times a day with meals. 180 tablet 3     metoprolol tartrate (LOPRESSOR) 50 MG tablet Take 1 tablet (50 mg total) by mouth daily. 90 tablet 3     naproxen (NAPROSYN) 500 MG tablet Take 1 tablet (500 mg total) by mouth every 12 (twelve) hours as needed (with food) TAKE 1 TABLET BY MOUTH TWICE DAILY WITH MEALS. 60 tablet 4     cholecalciferol, vitamin D3,  (VITAMIN D3) 2,000 unit capsule Take 1 capsule (2,000 Units total) by mouth daily. 90 capsule 3     No current facility-administered medications for this visit.      Social History     Tobacco Use   Smoking Status Former Smoker     Last attempt to quit: 2015     Years since quittin.2   Smokeless Tobacco Former User   Tobacco Comment     betel nut with tobacco       OBJECTICE: BP (!) 154/92   Pulse 98   Temp 98.7  F (37.1  C) (Oral)   Resp 16   SpO2 99%      Recent Results (from the past 24 hour(s))   Glycosylated Hemoglobin A1c    Collection Time: 19  4:14 PM   Result Value Ref Range    Hemoglobin A1c 6.1 (H) 3.5 - 6.0 %        GEN-alert, appropriate, in no apparent distress   HEENT-neck supple with no palpable mass or lymphadenopathy   CV-regular rate and rhythm with no murmur   RESP-lungs clear to auscultation   ABDOMINAL-soft and nontender   EXTREM-warm with no right ankle edema, left leg status post amputation   SKIN-skin defect over the right posterior neck in the area where he lanced the abscess.  There is no fluctuance or tenderness or induration, mild surrounding.      Wagner Lamar

## 2021-05-28 NOTE — TELEPHONE ENCOUNTER
RN cannot approve Refill Request    RN can NOT refill this medication med is not covered by policy/route to provider.    Juancho Tyson, [facility] Triage/Med Refill 5/8/2019    Requested Prescriptions   Pending Prescriptions Disp Refills     naproxen (NAPROSYN) 500 MG tablet [Pharmacy Med Name: NAPROXEN 500MG TABLETS] 60 tablet 0     Sig: TAKE 1 TABLET(500 MG) BY MOUTH EVERY 12 HOURS WITH FOOD AND WITH MEALS AS NEEDED       There is no refill protocol information for this order

## 2021-05-29 NOTE — PROGRESS NOTES
ASSESMENT AND PLAN:  Diagnoses and all orders for this visit:    Benign Essential Hypertension  Currently out of control.  On medication review and counseling, he is not been taking metoprolol.  We are going to switch to extended release and increase the dose as prescribed below.  Reviewed the risks and benefits of the medication with the patient and his family with the help of a professional .  Routine follow-up for recheck on his blood pressure in 3 weeks here in the clinic, sooner if problems.  -     metoprolol succinate (TOPROL XL) 100 MG 24 hr tablet; Take 1 tablet (100 mg total) by mouth daily.  Dispense: 30 tablet; Refill: 11    Type 2 diabetes mellitus with complication, without long-term current use of insulin (H)  -     Microalbumin, Random Urine    Phantom limb pain (H)  Counseled the patient on the use of acetaminophen as his first choice, naproxen as a needed medication for breakthrough, and gabapentin as his maintenance.  Encouraged him to bring all of his medication bottles with him to his follow-up visit in 3 weeks so we can fully understand how he is using the medications.            SUBJECTIVE: 58-year-old male here for follow-up on his blood pressure.  It is quite elevated today.  No chest pain, no shortness of breath, no ankle edema, no out of the ordinary headaches.  On review of his medicine bottles that he brings with him today it looks like he has not been taking his metoprolol.  He has been taking losartan at the higher dose of 100 mg daily, he reports he had no side effects or problems from the dose increased at his last visit.  Patient's last A1c showed good control of his diabetes.  Patient feels his mood has been good and his sleep has been good.  He does struggle with chronic phantom limb pain.  He has a history of a left leg amputation.  Pain is moderate in severity, usually controlled with gabapentin and acetaminophen but occasionally he is using naproxen for more severe  pain.    Past Medical History:   Diagnosis Date     History of transfusion      Patient Active Problem List   Diagnosis     Hypercholesterolemia     Chest Pain     Traumatic Amputation Of One Leg At/Above The Knee     Post-traumatic Stress Disorder     Myalgia And Myositis     Vitamin D Deficiency     Benign Essential Hypertension     Arthralgias In Multiple Sites     Back Pain     Insomnia     Esophageal Reflux     Limb Pain     Elevated liver enzymes     Chronic lower limb pain     Chronic pain of left lower extremity     Intellectual disability     History of closed head injury     Pleural effusion     Sepsis (H)     Hyponatremia     Empyema, right (H)     TY (acute kidney injury) (H)     Microcytic anemia     Generalized muscle weakness     Anemia due to blood loss, acute     Right-sided chest pain     Type 2 diabetes mellitus with complication, without long-term current use of insulin (H)     Phantom limb pain (H)     Current Outpatient Medications   Medication Sig Dispense Refill     cholecalciferol, vitamin D3, (VITAMIN D3) 2,000 unit capsule Take 1 capsule (2,000 Units total) by mouth daily. 90 capsule 3     gabapentin (NEURONTIN) 300 MG capsule Take 1 capsule (300 mg total) by mouth 2 (two) times a day. 180 capsule 3     losartan (COZAAR) 100 MG tablet Take 1 tablet (100 mg total) by mouth daily. TAKE 1 TABLET(50 MG) BY MOUTH DAILY 90 tablet 3     metFORMIN (GLUCOPHAGE) 500 MG tablet Take 1 tablet (500 mg total) by mouth 2 (two) times a day with meals. 180 tablet 3     naproxen (NAPROSYN) 500 MG tablet TAKE 1 TABLET(500 MG) BY MOUTH EVERY 12 HOURS WITH FOOD AND WITH MEALS AS NEEDED 60 tablet 3     acetaminophen (TYLENOL) 500 MG tablet Take 1 tablet (500 mg total) by mouth every 6 (six) hours as needed for pain TAKE ONE OR TWO TABLETS BY MOUTH EVERY SIX HOURS AS NEEDED. 100 tablet 11     capsaicin (ZOSTRIX) 0.025 % cream Apply 1 application topically 2 (two) times a day as needed. 60 g 11     metoprolol  "succinate (TOPROL XL) 100 MG 24 hr tablet Take 1 tablet (100 mg total) by mouth daily. 30 tablet 11     No current facility-administered medications for this visit.      Social History     Tobacco Use   Smoking Status Former Smoker     Last attempt to quit: 2015     Years since quittin.4   Smokeless Tobacco Former User   Tobacco Comment     betel nut with tobacco       OBJECTICE: BP (!) 170/106 (Patient Site: Right Arm, Patient Position: Sitting, Cuff Size: Adult Regular)   Pulse (!) 116   Temp 99  F (37.2  C) (Oral)   Resp 20   Ht 5' 5\" (1.651 m)   Wt 165 lb (74.8 kg)   BMI 27.46 kg/m       No results found for this or any previous visit (from the past 24 hour(s)).     GEN-alert, appropriate, in no apparent distress   Musculoskeletal- left leg amputation   CV-regular rate and rhythm with no murmur   RESP-lungs clear to auscultation   ABDOMINAL-soft and nontender   EXTREM-prosthesis of the left lower leg, no ankle edema or pitting edema of the right lower leg.   SKIN-no ulcers or vesicles      Wagner Lamar"

## 2021-05-30 NOTE — PROGRESS NOTES
ASSESMENT AND PLAN:  Diagnoses and all orders for this visit:    Benign Essential Hypertension, out-of-control  Counseling done with the patient and his daughter with the help of a professional .  Given the intolerance of 100 mg metoprolol, we are cutting it back to 50 mg/day.  Given the very high blood pressure readings today, we are also adding amlodipine 5 mg/day as ordered below.  Reviewed the risks and benefits of the medication, recheck here in the clinic in 2 to 3 weeks, sooner if problems.  -     metoprolol succinate (TOPROL XL) 100 MG 24 hr tablet; Take 0.5 tablets (50 mg total) by mouth daily.  Dispense: 30 tablet; Refill: 11  -     amLODIPine (NORVASC) 5 MG tablet; Take 1 tablet (5 mg total) by mouth daily.  Dispense: 30 tablet; Refill: 11          SUBJECTIVE: 58-year-old male comes in for follow-up on his blood pressure.  Unfortunately, it remains very elevated.  Patient reports that he started taking the 100 mg metoprolol after his last visit but only took 1 dose.  It made him feel very tired, so he stopped taking it.  On review of systems, he is been getting some mild shortness of breath.  No chest pain, no ankle edema, no headaches.  The shortness of breath is just been going on over this past day, it occurs with mild exertion, improves with rest.  No recent fevers or cough.    Past Medical History:   Diagnosis Date     History of transfusion      Patient Active Problem List   Diagnosis     Hypercholesterolemia     Chest Pain     Traumatic Amputation Of One Leg At/Above The Knee     Post-traumatic Stress Disorder     Myalgia And Myositis     Vitamin D Deficiency     Benign Essential Hypertension     Arthralgias In Multiple Sites     Back Pain     Insomnia     Esophageal Reflux     Limb Pain     Elevated liver enzymes     Chronic lower limb pain     Chronic pain of left lower extremity     Intellectual disability     History of closed head injury     Pleural effusion     Sepsis (H)      Hyponatremia     Empyema, right (H)     TY (acute kidney injury) (H)     Microcytic anemia     Generalized muscle weakness     Anemia due to blood loss, acute     Right-sided chest pain     Type 2 diabetes mellitus with complication, without long-term current use of insulin (H)     Phantom limb pain (H)     Current Outpatient Medications   Medication Sig Dispense Refill     acetaminophen (TYLENOL) 500 MG tablet Take 1 tablet (500 mg total) by mouth every 6 (six) hours as needed for pain TAKE ONE OR TWO TABLETS BY MOUTH EVERY SIX HOURS AS NEEDED. 100 tablet 11     cholecalciferol, vitamin D3, (VITAMIN D3) 2,000 unit capsule Take 1 capsule (2,000 Units total) by mouth daily. 90 capsule 3     gabapentin (NEURONTIN) 300 MG capsule Take 1 capsule (300 mg total) by mouth 2 (two) times a day. 180 capsule 3     losartan (COZAAR) 100 MG tablet Take 1 tablet (100 mg total) by mouth daily. TAKE 1 TABLET(50 MG) BY MOUTH DAILY 90 tablet 3     metoprolol succinate (TOPROL XL) 100 MG 24 hr tablet Take 0.5 tablets (50 mg total) by mouth daily. 30 tablet 11     naproxen (NAPROSYN) 500 MG tablet TAKE 1 TABLET(500 MG) BY MOUTH EVERY 12 HOURS WITH FOOD AND WITH MEALS AS NEEDED 60 tablet 3     amLODIPine (NORVASC) 5 MG tablet Take 1 tablet (5 mg total) by mouth daily. 30 tablet 11     capsaicin (ZOSTRIX) 0.025 % cream Apply 1 application topically 2 (two) times a day as needed. 60 g 11     metFORMIN (GLUCOPHAGE) 500 MG tablet Take 1 tablet (500 mg total) by mouth 2 (two) times a day with meals. 180 tablet 3     No current facility-administered medications for this visit.      Social History     Tobacco Use   Smoking Status Former Smoker     Last attempt to quit: 2015     Years since quittin.5   Smokeless Tobacco Former User   Tobacco Comment     betel nut with tobacco       OBJECTICE: BP (!) 172/94   Pulse (!) 108   Temp 98.6  F (37  C) (Oral)   Resp 16   SpO2 95%      No results found for this or any previous visit (from  the past 24 hour(s)).     GEN-alert, appropriate, in no apparent distress   Neurologic-cranial nerves II through XII intact.   CV-regular rate and rhythm with no murmur   RESP-lungs clear to auscultation   ABDOMINAL-soft and nontender   EXTREM-no right ankle edema, left leg amputation with prosthesis in place.   SKIN-no ulcers or vesicles      Wagner Lamar

## 2021-05-30 NOTE — PROGRESS NOTES
ASSESMENT AND PLAN:  Diagnoses and all orders for this visit:    Uncontrolled hypertension  Given the patient's failure to respond to increasing and hypertensive therapy as detailed below, we are going to check some labs to initiate some evaluation for resistant hypertension.  Extensive medication review and counseling with medication changes as detailed below.  Follow-up for recheck here in the clinic in 2 weeks, sooner if problems.  -     Basic Metabolic Panel  -     Metanephrines, Fractionated, Free, Plasma    Benign Essential Hypertension  Increase amlodipine and reinitiate metoprolol at half tablet (50 mg) of extended release at bedtime.  Recheck as detailed above.  -     amLODIPine (NORVASC) 5 MG tablet; Take 2 tablets (10 mg total) by mouth daily.  Dispense: 60 tablet; Refill: 11    Type 2 diabetes mellitus with complication, without long-term current use of insulin (H)  -     Glycosylated Hemoglobin A1c          SUBJECTIVE: 58-year-old male comes in for follow-up.  Since last visit he has been taking the new medication amlodipine every day and he is tolerating it well, he has not noticed any side effects or problems.  His blood pressure remains very high.  On review of systems, he is not having any chest pain or shortness of breath or headaches or edema.  Overall, patient reports that he is feeling well.  After last visit, he started amlodipine.  However, he did not restart metoprolol at half tablet dosing as I had planned.  Patient had stopped metoprolol in the past because it made him feel tired.    Past Medical History:   Diagnosis Date     History of transfusion      Patient Active Problem List   Diagnosis     Hypercholesterolemia     Chest Pain     Traumatic Amputation Of One Leg At/Above The Knee     Post-traumatic Stress Disorder     Myalgia And Myositis     Vitamin D Deficiency     Benign Essential Hypertension     Arthralgias In Multiple Sites     Back Pain     Insomnia     Esophageal Reflux      Limb Pain     Elevated liver enzymes     Chronic lower limb pain     Chronic pain of left lower extremity     Intellectual disability     History of closed head injury     Pleural effusion     Sepsis (H)     Hyponatremia     Empyema, right (H)     TY (acute kidney injury) (H)     Microcytic anemia     Generalized muscle weakness     Anemia due to blood loss, acute     Right-sided chest pain     Type 2 diabetes mellitus with complication, without long-term current use of insulin (H)     Phantom limb pain (H)     Current Outpatient Medications   Medication Sig Dispense Refill     amLODIPine (NORVASC) 5 MG tablet Take 2 tablets (10 mg total) by mouth daily. 60 tablet 11     cholecalciferol, vitamin D3, (VITAMIN D3) 2,000 unit capsule Take 1 capsule (2,000 Units total) by mouth daily. 90 capsule 3     gabapentin (NEURONTIN) 300 MG capsule Take 1 capsule (300 mg total) by mouth 2 (two) times a day. 180 capsule 3     losartan (COZAAR) 100 MG tablet Take 1 tablet (100 mg total) by mouth daily. TAKE 1 TABLET(50 MG) BY MOUTH DAILY 90 tablet 3     metoprolol succinate (TOPROL XL) 100 MG 24 hr tablet Take 0.5 tablets (50 mg total) by mouth daily. 30 tablet 11     naproxen (NAPROSYN) 500 MG tablet TAKE 1 TABLET(500 MG) BY MOUTH EVERY 12 HOURS WITH FOOD AND WITH MEALS AS NEEDED 60 tablet 3     acetaminophen (TYLENOL) 500 MG tablet Take 1 tablet (500 mg total) by mouth every 6 (six) hours as needed for pain TAKE ONE OR TWO TABLETS BY MOUTH EVERY SIX HOURS AS NEEDED. 100 tablet 11     capsaicin (ZOSTRIX) 0.025 % cream Apply 1 application topically 2 (two) times a day as needed. 60 g 11     metFORMIN (GLUCOPHAGE) 500 MG tablet Take 1 tablet (500 mg total) by mouth 2 (two) times a day with meals. 180 tablet 3     No current facility-administered medications for this visit.      Social History     Tobacco Use   Smoking Status Former Smoker     Last attempt to quit: 2015     Years since quittin.5   Smokeless Tobacco Former  User   Tobacco Comment     betel nut with tobacco       OBJECTICE: BP (!) 186/86   Pulse (!) 115   Temp 99  F (37.2  C) (Oral)   Resp 24   SpO2 98%      No results found for this or any previous visit (from the past 24 hour(s)).     GEN-alert, appropriate, in no apparent distress   Eyes- funduscopic exam limited by the undilated pupil bilaterally but appears normal.   CV-borderline mild tachycardia, regular, no murmur.   RESP-lungs clear to auscultation.   EXTREM-no right ankle edema, left leg amputation.   SKIN-no rash.         Wagner Lamar

## 2021-05-31 VITALS — BODY MASS INDEX: 32.85 KG/M2 | HEIGHT: 62 IN | WEIGHT: 178.5 LBS

## 2021-06-01 VITALS — WEIGHT: 159 LBS | BODY MASS INDEX: 26.49 KG/M2 | HEIGHT: 65 IN

## 2021-06-01 VITALS — BODY MASS INDEX: 26.46 KG/M2 | HEIGHT: 65 IN

## 2021-06-01 VITALS — BODY MASS INDEX: 25.49 KG/M2 | HEIGHT: 65 IN | WEIGHT: 153 LBS

## 2021-06-01 VITALS — WEIGHT: 178 LBS | BODY MASS INDEX: 32.56 KG/M2

## 2021-06-01 NOTE — TELEPHONE ENCOUNTER
RN cannot approve Refill Request    RN can NOT refill this medication med is not covered by policy/route to provider. Last office visit: 8/15/2019 Wagner Lamar MD Last Physical: Visit date not found Last MTM visit: Visit date not found Last visit same specialty: 8/15/2019 Wagner Lamar MD.  Next visit within 3 mo: Visit date not found  Next physical within 3 mo: Visit date not found      Silva Lopez, [facility] Triage/Med Refill 9/9/2019    Requested Prescriptions   Pending Prescriptions Disp Refills     naproxen (NAPROSYN) 500 MG tablet [Pharmacy Med Name: NAPROXEN 500MG TABLETS] 60 tablet 0     Sig: TAKE 1 TABLET(500 MG) BY MOUTH EVERY 12 HOURS WITH FOOD AND WITH MEALS AS NEEDED       There is no refill protocol information for this order

## 2021-06-01 NOTE — PROGRESS NOTES
ASSESMENT AND PLAN:  Diagnoses and all orders for this visit:    Benign Essential Hypertension, out-of-control  -     amLODIPine (NORVASC) 5 MG tablet; Take 2 tablets (10 mg total) by mouth daily.  Dispense: 90 tablet; Refill: 3, this prescription was sent initially but then I followed up with a message to disregard this prescription because it should be 10 mg tablet once daily.  Once the patient resumes his amlodipine at 10 mg once daily, I would like him to follow-up in about a month for recheck to make sure his blood pressure is responding appropriately.  Sooner if problems.  We reviewed indications for follow-up.    Vitamin D deficiency  Currently off treatment   restart-     cholecalciferol, vitamin D3, (VITAMIN D3) 2,000 unit capsule; Take 1 capsule (2,000 Units total) by mouth daily.  Dispense: 90 capsule; Refill: 3    Type 2 diabetes mellitus with complication, without long-term current use of insulin (H)  Stable.  Healthy eating, continue metformin as prescribed below, last A1c showed good control of his diabetes.  Follow-up in clinic with me in 6 weeks.  Sooner if problems.  -     metFORMIN (GLUCOPHAGE) 500 MG tablet; Take 1 tablet (500 mg total) by mouth 2 (two) times a day with meals.  Dispense: 180 tablet; Refill: 3    Need for immunization against influenza  -     Influenza,Seasonal,Quad,INJ =/>6months              SUBJECTIVE: 5-year-old male comes in for follow-up on his hypertension.  He brings all his medications with him, unfortunately he is not currently taking amlodipine.  He also is not taking vitamin D.  He has been taking his losartan and metoprolol.  Today he has significantly elevated blood pressure.  He denies any headache or chest pain or shortness of breath or leg edema.  Patient has a long-term history of left leg prosthesis secondary to left leg amputation.    Past Medical History:   Diagnosis Date     History of transfusion      Patient Active Problem List   Diagnosis      Hypercholesterolemia     Chest Pain     Traumatic Amputation Of One Leg At/Above The Knee     Post-traumatic Stress Disorder     Myalgia And Myositis     Vitamin D Deficiency     Benign Essential Hypertension     Arthralgias In Multiple Sites     Back Pain     Insomnia     Esophageal Reflux     Limb Pain     Elevated liver enzymes     Chronic lower limb pain     Chronic pain of left lower extremity     Intellectual disability     History of closed head injury     Pleural effusion     Sepsis (H)     Hyponatremia     Empyema, right (H)     TY (acute kidney injury) (H)     Microcytic anemia     Generalized muscle weakness     Anemia due to blood loss, acute     Right-sided chest pain     Type 2 diabetes mellitus with complication, without long-term current use of insulin (H)     Phantom limb pain (H)     Urinary incontinence     Current Outpatient Medications   Medication Sig Dispense Refill     gabapentin (NEURONTIN) 300 MG capsule Take 1 capsule (300 mg total) by mouth 2 (two) times a day. 180 capsule 3     losartan (COZAAR) 100 MG tablet Take 1 tablet (100 mg total) by mouth daily. TAKE 1 TABLET(50 MG) BY MOUTH DAILY 90 tablet 3     metoprolol succinate (TOPROL XL) 100 MG 24 hr tablet Take 0.5 tablets (50 mg total) by mouth daily. 30 tablet 11     acetaminophen (TYLENOL) 500 MG tablet Take 1 tablet (500 mg total) by mouth every 6 (six) hours as needed for pain TAKE ONE OR TWO TABLETS BY MOUTH EVERY SIX HOURS AS NEEDED. 100 tablet 11     amLODIPine (NORVASC) 5 MG tablet Take 2 tablets (10 mg total) by mouth daily. 90 tablet 3     cholecalciferol, vitamin D3, (VITAMIN D3) 2,000 unit capsule Take 1 capsule (2,000 Units total) by mouth daily. 90 capsule 3     metFORMIN (GLUCOPHAGE) 500 MG tablet Take 1 tablet (500 mg total) by mouth 2 (two) times a day with meals. 180 tablet 3     naproxen (NAPROSYN) 500 MG tablet TAKE 1 TABLET(500 MG) BY MOUTH EVERY 12 HOURS WITH FOOD AND WITH MEALS AS NEEDED 60 tablet 0     No  "current facility-administered medications for this visit.      Social History     Tobacco Use   Smoking Status Former Smoker     Last attempt to quit: 2015     Years since quittin.7   Smokeless Tobacco Former User   Tobacco Comment     betel nut with tobacco       OBJECTICE: BP (!) 178/102 (Patient Site: Right Arm, Patient Position: Sitting, Cuff Size: Adult Large)   Pulse 98   Temp 98.9  F (37.2  C) (Oral)   Resp 16   Ht 5' 5\" (1.651 m)   Wt 165 lb (74.8 kg)   SpO2 98%   BMI 27.46 kg/m       No results found for this or any previous visit (from the past 24 hour(s)).     GEN-alert, appropriate, in no apparent distress   Neurologic-cranial nerves II through XII are intact   CV-regular rate and rhythm with no murmur   RESP-lungs clear to auscultation   ABDOMINAL-soft and nontender to palpation with no palpable mass or organomegaly   EXTREM-no right ankle edema, left leg amputation   SKIN-no ulcers or vesicles      Wagner Lamar"

## 2021-06-02 VITALS — BODY MASS INDEX: 26.99 KG/M2 | WEIGHT: 162 LBS | HEIGHT: 65 IN

## 2021-06-03 ENCOUNTER — COMMUNICATION - HEALTHEAST (OUTPATIENT)
Dept: NURSING | Facility: CLINIC | Age: 61
End: 2021-06-03

## 2021-06-03 VITALS — BODY MASS INDEX: 27.86 KG/M2 | WEIGHT: 167.25 LBS | HEIGHT: 65 IN

## 2021-06-03 VITALS
TEMPERATURE: 98.9 F | SYSTOLIC BLOOD PRESSURE: 164 MMHG | DIASTOLIC BLOOD PRESSURE: 80 MMHG | OXYGEN SATURATION: 98 % | RESPIRATION RATE: 20 BRPM | HEART RATE: 117 BPM

## 2021-06-03 VITALS
RESPIRATION RATE: 16 BRPM | HEART RATE: 98 BPM | SYSTOLIC BLOOD PRESSURE: 178 MMHG | BODY MASS INDEX: 27.49 KG/M2 | HEIGHT: 65 IN | DIASTOLIC BLOOD PRESSURE: 102 MMHG | TEMPERATURE: 98.9 F | OXYGEN SATURATION: 98 % | WEIGHT: 165 LBS

## 2021-06-03 VITALS — WEIGHT: 165 LBS | BODY MASS INDEX: 27.49 KG/M2 | HEIGHT: 65 IN

## 2021-06-03 NOTE — TELEPHONE ENCOUNTER
Pharmacy called asking about which dose the patient it to take for amlodipine. I informed the pharmacist that patient is to take just 10mg daily according to Dr Valenzuela's instructions. Pharmacist just wanted to let you know that patient has been taking both the 5mg and the 10mg together, but she did clarify it with the patient that she is only to take 10mg once daily. Just a FYI.

## 2021-06-03 NOTE — PROGRESS NOTES
ASSESMENT AND PLAN:  Diagnoses and all orders for this visit:    Resistant hypertension  Reviewed test results with the patient and his family with the help of a professional .  Normal metabolic panel at the last visit and normal plasma metanephrines.  Extensive counseling and review of his medications today.  Based on the pill counts in the bottles and the disorganized collection of partially empty bottles and dating of those prescriptions, I am concerned that he is not well adherent to his antihypertensive regimen.  I was able to talk to our pharmacist and get a weekly pill organizer for the patient and his family to use.  Patient acknowledges that it can be difficult to keep everything straight with his multiple medications and there have been some problems with adherence, he is appreciative of the organizer to try to organize and prioritize his blood pressure pills.  I demonstrated for him and his family how to set up the antihypertensive pills in the organizer and he is going to double his efforts to get good medication adherence for this plan over the next month, will recheck with me in the clinic in 1 month, if still significantly elevated blood pressures then will consider renal artery imaging at that time.    Over 25 minutes of total face-to-face time, more than half in counseling and coordination of care on the above.       SUBJECTIVE: 59-year-old male here for follow-up on his elevated blood pressure.  He brings his medication bottles in today in a bag, as detailed above.  Overall, he is been feeling well.  On review of systems, he is not having any chest pain or shortness of breath.  No out of the ordinary headaches.    Past Medical History:   Diagnosis Date     History of transfusion      Patient Active Problem List   Diagnosis     Hypercholesterolemia     Chest Pain     Traumatic Amputation Of One Leg At/Above The Knee     Post-traumatic Stress Disorder     Myalgia And Myositis     Vitamin D  Deficiency     Benign Essential Hypertension     Arthralgias In Multiple Sites     Back Pain     Insomnia     Esophageal Reflux     Limb Pain     Elevated liver enzymes     Chronic lower limb pain     Chronic pain of left lower extremity     Intellectual disability     History of closed head injury     Pleural effusion     Sepsis (H)     Hyponatremia     Empyema, right (H)     TY (acute kidney injury) (H)     Microcytic anemia     Generalized muscle weakness     Anemia due to blood loss, acute     Right-sided chest pain     Type 2 diabetes mellitus with complication, without long-term current use of insulin (H)     Phantom limb pain (H)     Urinary incontinence     Current Outpatient Medications   Medication Sig Dispense Refill     amLODIPine (NORVASC) 10 MG tablet Take 1 tablet (10 mg total) by mouth daily. 90 tablet 3     cholecalciferol, vitamin D3, (VITAMIN D3) 2,000 unit capsule Take 1 capsule (2,000 Units total) by mouth daily. 90 capsule 3     gabapentin (NEURONTIN) 300 MG capsule Take 1 capsule (300 mg total) by mouth 2 (two) times a day. 180 capsule 3     losartan (COZAAR) 100 MG tablet Take 1 tablet (100 mg total) by mouth daily. TAKE 1 TABLET(50 MG) BY MOUTH DAILY 90 tablet 3     metFORMIN (GLUCOPHAGE) 500 MG tablet Take 1 tablet (500 mg total) by mouth 2 (two) times a day with meals. 180 tablet 3     metoprolol succinate (TOPROL XL) 100 MG 24 hr tablet Take 0.5 tablets (50 mg total) by mouth daily. 30 tablet 11     acetaminophen (TYLENOL) 500 MG tablet Take 1 tablet (500 mg total) by mouth every 6 (six) hours as needed for pain TAKE ONE OR TWO TABLETS BY MOUTH EVERY SIX HOURS AS NEEDED. 100 tablet 11     naproxen (NAPROSYN) 500 MG tablet TAKE 1 TABLET(500 MG) BY MOUTH EVERY 12 HOURS WITH FOOD AND WITH MEALS AS NEEDED 60 tablet 0     No current facility-administered medications for this visit.      Social History     Tobacco Use   Smoking Status Former Smoker     Last attempt to quit: 1/1/2015     Years  since quittin.8   Smokeless Tobacco Former User   Tobacco Comment     betel nut with tobacco       OBJECTICE: /80   Pulse (!) 117   Temp 98.9  F (37.2  C) (Oral)   Resp 20   SpO2 98%      No results found for this or any previous visit (from the past 24 hour(s)).     GEN-alert, appropriate, in no acute distress   CV-regular rate and rhythm with no murmur   RESP-lungs clear to auscultation   ABDOMINAL-soft, nontender to palpation, no palpable masses   EXTREM-left leg amputation, right ankle trace pitting edema     Wagner Lamar

## 2021-06-04 VITALS
BODY MASS INDEX: 28.32 KG/M2 | HEART RATE: 121 BPM | RESPIRATION RATE: 16 BRPM | HEIGHT: 65 IN | TEMPERATURE: 98 F | DIASTOLIC BLOOD PRESSURE: 90 MMHG | SYSTOLIC BLOOD PRESSURE: 154 MMHG | OXYGEN SATURATION: 97 % | WEIGHT: 170 LBS

## 2021-06-04 VITALS
SYSTOLIC BLOOD PRESSURE: 156 MMHG | TEMPERATURE: 98.4 F | RESPIRATION RATE: 20 BRPM | BODY MASS INDEX: 28.41 KG/M2 | WEIGHT: 170.5 LBS | DIASTOLIC BLOOD PRESSURE: 88 MMHG | HEART RATE: 134 BPM | HEIGHT: 65 IN | OXYGEN SATURATION: 96 %

## 2021-06-04 VITALS
TEMPERATURE: 99.6 F | OXYGEN SATURATION: 95 % | SYSTOLIC BLOOD PRESSURE: 122 MMHG | RESPIRATION RATE: 20 BRPM | DIASTOLIC BLOOD PRESSURE: 70 MMHG | HEART RATE: 123 BPM

## 2021-06-04 NOTE — PROGRESS NOTES
ASSESMENT AND PLAN:  Diagnoses and all orders for this visit:    Resistant hypertension  Significant improvement with better medication adherence compared to last visit, see last visit note for details, we seem to have found a 3 drug combination that he tolerates well and is willing to take daily.  Given the history of resistant hypertension and given the ongoing sinus tachycardia I think it is worth checking the labs as ordered below.  -     Metanephrines, Fractionated, Free, Plasma    Diabetes mellitus, type 2 (H)  Stable.  -     Glycosylated Hemoglobin A1c  -     [facility] RED    Sinus tachycardia  Stable.  Reviewed previous EKGs, he has had a consistent sinus tachycardia on multiple previous EKGs.  Asymptomatic.  -     Metanephrines, Fractionated, Free, Plasma          SUBJECTIVE: 59-year-old male here for close follow-up on resistant hypertension, please see previous note for details.  Since last visit, he has been taking atenolol every day along with his amlodipine and losartan.  He has not noticed any side effects or problems with this combination of medications.  No chest pain, no palpitation, no shortness of breath, no headache.    Past Medical History:   Diagnosis Date     History of transfusion      Patient Active Problem List   Diagnosis     Hypercholesterolemia     Chest Pain     Traumatic Amputation Of One Leg At/Above The Knee     Post-traumatic Stress Disorder     Myalgia And Myositis     Vitamin D Deficiency     Benign Essential Hypertension     Arthralgias In Multiple Sites     Back Pain     Insomnia     Esophageal Reflux     Limb Pain     Elevated liver enzymes     Chronic lower limb pain     Chronic pain of left lower extremity     Intellectual disability     History of closed head injury     Pleural effusion     Sepsis (H)     Hyponatremia     Empyema, right (H)     TY (acute kidney injury) (H)     Microcytic anemia     Generalized muscle weakness     Anemia due to blood loss, acute     Right-sided  chest pain     Type 2 diabetes mellitus with complication, without long-term current use of insulin (H)     Phantom limb pain (H)     Urinary incontinence     Resistant hypertension     Sinus tachycardia     Current Outpatient Medications   Medication Sig Dispense Refill     amLODIPine (NORVASC) 10 MG tablet Take 1 tablet (10 mg total) by mouth daily. 90 tablet 3     atenolol (TENORMIN) 50 MG tablet Take 1 tablet (50 mg total) by mouth daily. 30 tablet 11     gabapentin (NEURONTIN) 300 MG capsule Take 1 capsule (300 mg total) by mouth 2 (two) times a day. 180 capsule 3     losartan (COZAAR) 100 MG tablet Take 1 tablet (100 mg total) by mouth daily. TAKE 1 TABLET(50 MG) BY MOUTH DAILY 90 tablet 3     metFORMIN (GLUCOPHAGE) 500 MG tablet Take 1 tablet (500 mg total) by mouth 2 (two) times a day with meals. 180 tablet 3     acetaminophen (TYLENOL) 500 MG tablet Take 1 tablet (500 mg total) by mouth every 6 (six) hours as needed for pain TAKE ONE OR TWO TABLETS BY MOUTH EVERY SIX HOURS AS NEEDED. 100 tablet 11     cholecalciferol, vitamin D3, (VITAMIN D3) 2,000 unit capsule Take 1 capsule (2,000 Units total) by mouth daily. 90 capsule 3     No current facility-administered medications for this visit.      Social History     Tobacco Use   Smoking Status Former Smoker     Last attempt to quit: 2015     Years since quittin.9   Smokeless Tobacco Former User   Tobacco Comment     betel nut with tobacco       OBJECTICE: /70 (Patient Site: Right Arm)   Pulse (!) 123   Temp 99.6  F (37.6  C) (Oral)   Resp 20   SpO2 95%      No results found for this or any previous visit (from the past 24 hour(s)).     GEN-alert, appropriate, in no apparent distress   Neurologic-cranial nerves II through XII are intact   CV-regular tachycardia, no murmur   RESP-lungs clear to auscultation   ABDOMINAL-soft and nontender to palpation   EXTREM-unilateral leg amputation, other side is without ankle edema.   SKIN-no ulcers or  vesicles.      Wagner PEARL [place]

## 2021-06-04 NOTE — PROGRESS NOTES
ASSESMENT AND PLAN:  Diagnoses and all orders for this visit:    Resistant hypertension  Improving but still not under control.  Will add clonidine as prescribed below.  Reviewed the risks and benefits of the medication with the patient with the help of a professional  will recheck in the clinic next week.  Also will recheck his tachycardia, on exam it sounds like a regular sinus tachycardia, asymptomatic.  -     cloNIDine HCl (CATAPRES) 0.1 MG tablet; Take 1 tablet (0.1 mg total) by mouth 2 (two) times a day.  Dispense: 60 tablet; Refill: 6    Arthralgias In Multiple Sites, Chronic pain of left lower extremity  Given the problems with his blood pressure I recommended that naproxen be discontinued, avoid all NSAIDs, and just use acetaminophen for now.  -     Cancel: naproxen (NAPROSYN) 500 MG tablet  Dispense: 60 tablet; Refill: 0          SUBJECTIVE: 59-year-old male comes in for follow-up on his hypertension, which has been difficult to get under control.  He brings all of his medications in with him today and is with a family member, both the patient and his family member report that he is taking the medications out of his pillbox and taking them every day as directed.  He is currently taking amlodipine, losartan, and metoprolol for blood pressure.  Blood pressure remains significantly elevated.  However, he is asymptomatic.  On review of systems, he is not having any chest pain or palpitations or headaches or shortness of breath or ankle edema or new areas of focal numbness or weakness.  He is having some exacerbation of his arthritis and chronic leg pain.  He has a history of amputation and history of arthritis and gets phantom limb pain.  He has been using naproxen for this combination of pain symptoms.    Past Medical History:   Diagnosis Date     History of transfusion      Patient Active Problem List   Diagnosis     Hypercholesterolemia     Chest Pain     Traumatic Amputation Of One Leg At/Above  The Knee     Post-traumatic Stress Disorder     Myalgia And Myositis     Vitamin D Deficiency     Benign Essential Hypertension     Arthralgias In Multiple Sites     Back Pain     Insomnia     Esophageal Reflux     Limb Pain     Elevated liver enzymes     Chronic lower limb pain     Chronic pain of left lower extremity     Intellectual disability     History of closed head injury     Pleural effusion     Sepsis (H)     Hyponatremia     Empyema, right (H)     TY (acute kidney injury) (H)     Microcytic anemia     Generalized muscle weakness     Anemia due to blood loss, acute     Right-sided chest pain     Type 2 diabetes mellitus with complication, without long-term current use of insulin (H)     Phantom limb pain (H)     Urinary incontinence     Current Outpatient Medications   Medication Sig Dispense Refill     acetaminophen (TYLENOL) 500 MG tablet Take 1 tablet (500 mg total) by mouth every 6 (six) hours as needed for pain TAKE ONE OR TWO TABLETS BY MOUTH EVERY SIX HOURS AS NEEDED. 100 tablet 11     amLODIPine (NORVASC) 10 MG tablet Take 1 tablet (10 mg total) by mouth daily. 90 tablet 3     cholecalciferol, vitamin D3, (VITAMIN D3) 2,000 unit capsule Take 1 capsule (2,000 Units total) by mouth daily. 90 capsule 3     gabapentin (NEURONTIN) 300 MG capsule Take 1 capsule (300 mg total) by mouth 2 (two) times a day. 180 capsule 3     losartan (COZAAR) 100 MG tablet Take 1 tablet (100 mg total) by mouth daily. TAKE 1 TABLET(50 MG) BY MOUTH DAILY 90 tablet 3     metFORMIN (GLUCOPHAGE) 500 MG tablet Take 1 tablet (500 mg total) by mouth 2 (two) times a day with meals. 180 tablet 3     metoprolol succinate (TOPROL XL) 100 MG 24 hr tablet Take 0.5 tablets (50 mg total) by mouth daily. 30 tablet 11     cloNIDine HCl (CATAPRES) 0.1 MG tablet Take 1 tablet (0.1 mg total) by mouth 2 (two) times a day. 60 tablet 6     No current facility-administered medications for this visit.      Social History     Tobacco Use   Smoking  "Status Former Smoker     Last attempt to quit: 2015     Years since quittin.9   Smokeless Tobacco Former User   Tobacco Comment     betel nut with tobacco       OBJECTICE: /88   Pulse (!) 134   Temp 98.4  F (36.9  C) (Oral)   Resp 20   Ht 5' 5\" (1.651 m)   Wt 170 lb 8 oz (77.3 kg)   SpO2 96%   BMI 28.37 kg/m       No results found for this or any previous visit (from the past 24 hour(s)).     GEN-alert, appropriate, in no apparent distress   Neurologic-cranial nerves II through XII are intact   CV-regular tachycardia with no murmur   RESP-lungs clear to auscultation       Wagner Lamar          "

## 2021-06-04 NOTE — PROGRESS NOTES
ASSESMENT AND PLAN:  Diagnoses and all orders for this visit:    Resistant hypertension  Please see previous notes for further details.  Today I think we finally made a breakthrough with me understanding how he is taking his medications and indeed there are major adherence issues.  As detailed below, he has not been taking metoprolol because of a perceived side effect and stopped taking clonidine because of a perceived side effect.  He has been taking losartan every day but has only been taking amlodipine about 3 times per week.  Extensive counseling today with the patient that it would not be normal for him to be able to feel the beneficial effects of amlodipine and that to get the benefit from it he should take it daily just like he does the losartan.  He agrees to this plan.  We are going to stop metoprolol and replace with atenolol to see if the perceived side effect improves.  Discontinue clonidine as that does seem to be a headache side effect of the medication given the pattern of symptoms detailed below.  For the next week I would like him to religiously take atenolol, amlodipine, and losartan every day and he is in agreement with the plan and he will recheck with me next week in the morning for a morning appointment and if he has persistently elevated significantly elevated blood pressure despite these treatments then I think he needs the full work-up for resistant hypertension and we will do a.m. labs with the following lab work: Morning plasma aldosterone concentration, morning plasma renin activity, plasma fractionated metanephrines, comprehensive metabolic panel.  Pheochromocytoma is in the differential given his palpitations and tachycardia and resistant hypertension.    I did review his imaging and he did have a CT angiogram that included the abdomen/renal arteries last year which did not show any stenosis.      -     atenolol (TENORMIN) 50 MG tablet; Take 1 tablet (50 mg total) by mouth daily.   Dispense: 30 tablet; Refill: 11          SUBJECTIVE: 59-year-old male comes in today for follow-up.  Unfortunately, his blood pressure continues to be significantly elevated.  Extensive discussion today with the patient and his family about his medications.  Previously he had always reported that he took his medications as prescribed.  However, today, it comes out that when he takes his metoprolol he feels like he gets a pressure in his upper abdomen and a worsening of his palpitations.  Therefore, he stopped taking that medication completely, it sounds like he has not been taking it for a couple of months.  Last visit, clonidine was added but he stopped taking it after a few days because it was causing him to get a headache after taking it.  Once he stopped taking the clonidine the headache improved.  Patient reports that he takes losartan every day.  He reports that it makes him feel good when he takes losartan.  He says that he only takes the amlodipine 3 times per week approximately because he does not feel any benefit from the amlodipine like he does from losartan.  I tried to understand from him what he means by feeling better after taking losartan but it is difficult for him to explain it or something is lost in translation with our  today.  Patient does get intermittent palpitations.  He does not feel like they are getting progressively worse.  No exertional chest pain.    Past Medical History:   Diagnosis Date     History of transfusion      Patient Active Problem List   Diagnosis     Hypercholesterolemia     Chest Pain     Traumatic Amputation Of One Leg At/Above The Knee     Post-traumatic Stress Disorder     Myalgia And Myositis     Vitamin D Deficiency     Benign Essential Hypertension     Arthralgias In Multiple Sites     Back Pain     Insomnia     Esophageal Reflux     Limb Pain     Elevated liver enzymes     Chronic lower limb pain     Chronic pain of left lower extremity     Intellectual  "disability     History of closed head injury     Pleural effusion     Sepsis (H)     Hyponatremia     Empyema, right (H)     TY (acute kidney injury) (H)     Microcytic anemia     Generalized muscle weakness     Anemia due to blood loss, acute     Right-sided chest pain     Type 2 diabetes mellitus with complication, without long-term current use of insulin (H)     Phantom limb pain (H)     Urinary incontinence     Current Outpatient Medications   Medication Sig Dispense Refill     acetaminophen (TYLENOL) 500 MG tablet Take 1 tablet (500 mg total) by mouth every 6 (six) hours as needed for pain TAKE ONE OR TWO TABLETS BY MOUTH EVERY SIX HOURS AS NEEDED. 100 tablet 11     amLODIPine (NORVASC) 10 MG tablet Take 1 tablet (10 mg total) by mouth daily. 90 tablet 3     cholecalciferol, vitamin D3, (VITAMIN D3) 2,000 unit capsule Take 1 capsule (2,000 Units total) by mouth daily. 90 capsule 3     gabapentin (NEURONTIN) 300 MG capsule Take 1 capsule (300 mg total) by mouth 2 (two) times a day. 180 capsule 3     losartan (COZAAR) 100 MG tablet Take 1 tablet (100 mg total) by mouth daily. TAKE 1 TABLET(50 MG) BY MOUTH DAILY 90 tablet 3     metFORMIN (GLUCOPHAGE) 500 MG tablet Take 1 tablet (500 mg total) by mouth 2 (two) times a day with meals. 180 tablet 3     atenolol (TENORMIN) 50 MG tablet Take 1 tablet (50 mg total) by mouth daily. 30 tablet 11     No current facility-administered medications for this visit.      Social History     Tobacco Use   Smoking Status Former Smoker     Last attempt to quit: 2015     Years since quittin.9   Smokeless Tobacco Former User   Tobacco Comment     betel nut with tobacco       OBJECTICE: /90   Pulse (!) 121   Temp 98  F (36.7  C) (Oral)   Resp 16   Ht 5' 5\" (1.651 m)   Wt 170 lb (77.1 kg)   SpO2 97%   BMI 28.29 kg/m       No results found for this or any previous visit (from the past 24 hour(s)).     GEN-alert, appropriate, in no acute distress   CV-regular " tachycardia, no murmur   RESP-lungs clear to auscultation   EXTREM-no ankle edema on one side, amputation on the other.     Wagner Lamar

## 2021-06-07 NOTE — TELEPHONE ENCOUNTER
FYI - Status Update  Who is Calling: Child  Update: Patient's daughter and her friend (who was interpreting for the daughter) stated the patient is out of his gabapentin and would like this refilled as soon as possible.  Okay to leave a detailed message?:  No

## 2021-06-08 NOTE — TELEPHONE ENCOUNTER
Refill Approved    Rx renewed per Medication Renewal Policy. Medication was last renewed on 4/8/19.    Ana Maria Butts, [facility] Triage/Med Refill 5/8/2020     Requested Prescriptions   Pending Prescriptions Disp Refills     losartan (COZAAR) 100 MG tablet [Pharmacy Med Name: LOSARTAN 100MG TABLETS] 90 tablet 3     Sig: TAKE 1 TABLET BY MOUTH DAILY       Angiotensin Receptor Blocker Protocol Passed - 5/6/2020  3:01 PM        Passed - PCP or prescribing provider visit in past 12 months       Last office visit with prescriber/PCP: 12/17/2019 Wagner Lamar MD OR same dept: 12/17/2019 Wagner Lamar MD OR same specialty: 12/17/2019 Wagner Lamar MD  Last physical: Visit date not found Last MTM visit: Visit date not found   Next visit within 3 mo: Visit date not found  Next physical within 3 mo: Visit date not found  Prescriber OR PCP: Wagner Lamar MD  Last diagnosis associated with med order: 1. Benign Essential Hypertension  - losartan (COZAAR) 100 MG tablet [Pharmacy Med Name: LOSARTAN 100MG TABLETS]; TAKE 1 TABLET BY MOUTH DAILY  Dispense: 90 tablet; Refill: 3    If protocol passes may refill for 12 months if within 3 months of last provider visit (or a total of 15 months).             Passed - Serum potassium within the past 12 months     Lab Results   Component Value Date    Potassium 3.8 07/30/2019             Passed - Blood pressure filed in past 12 months     BP Readings from Last 1 Encounters:   12/17/19 122/70             Passed - Serum creatinine within the past 12 months     Creatinine   Date Value Ref Range Status   07/30/2019 0.86 0.70 - 1.30 mg/dL Final

## 2021-06-08 NOTE — TELEPHONE ENCOUNTER
----- Message from Wagner Lamar MD sent at 5/7/2020  1:20 PM CDT -----  Can you schedule him to see me in clinic face to face week in May and I will repeat his labs then.  Thanks.

## 2021-06-08 NOTE — TELEPHONE ENCOUNTER
----- Message from Wagner Lamar MD sent at 6/3/2020  4:24 PM CDT -----  Please inform the patient that his blood test is still elevated and therefore I would like him to have the scan that we talked about in the clinic.  I ordered a CT scan abdomen and pelvis, please help coordinate scheduling.  Thank you.

## 2021-06-08 NOTE — PROGRESS NOTES
ASSESMENT AND PLAN:  Diagnoses and all orders for this visit:    Resistant hypertension  Now well controlled and he has good medication adherence.  Medication refills done as ordered below.  Given the previous elevation in 1 of his metanephrines we are rechecking that lab today as ordered below.  I did  the patient that if his lab result comes back abnormal again that we will be recommending a scan to evaluate for adrenal lesions-pheochromocytoma.  Patient is in agreement with the plan.  -     atenoloL (TENORMIN) 50 MG tablet; Take 1 tablet (50 mg total) by mouth daily.  Dispense: 90 tablet; Refill: 3  -     Metanephrines, Fractionated, Free, Plasma  -     Comprehensive Metabolic Panel    Benign Essential Hypertension  -     losartan (COZAAR) 100 MG tablet; Take 1 tablet (100 mg total) by mouth daily.  Dispense: 90 tablet; Refill: 3  -     amLODIPine (NORVASC) 10 MG tablet; Take 1 tablet (10 mg total) by mouth daily.  Dispense: 90 tablet; Refill: 3    Sinus tachycardia  -     Metanephrines, Fractionated, Free, Plasma    Vitamin D deficiency  -     cholecalciferol, vitamin D3, (VITAMIN D3) 50 mcg (2,000 unit) capsule; Take 1 capsule (2,000 Units total) by mouth daily.  Dispense: 90 capsule; Refill: 3    Arthralgias In Multiple Sites  -     gabapentin (NEURONTIN) 300 MG capsule; Take 1 capsule (300 mg total) by mouth 2 (two) times a day.  Dispense: 180 capsule; Refill: 3    Chronic pain of left lower extremity  -     gabapentin (NEURONTIN) 300 MG capsule; Take 1 capsule (300 mg total) by mouth 2 (two) times a day.  Dispense: 180 capsule; Refill: 3    Type 2 diabetes mellitus with complication, without long-term current use of insulin (H)  -     metFORMIN (GLUCOPHAGE) 500 MG tablet; Take 1 tablet (500 mg total) by mouth 2 (two) times a day with meals.  Dispense: 180 tablet; Refill: 3  -     Glycosylated Hemoglobin A1c  -     Comprehensive Metabolic Panel  -     LDL Cholesterol, Direct        Reviewed the risks  and benefits of the treatment plan with the patient and/or caregiver and we discussed indications for routine and emergent follow-up.        SUBJECTIVE: 59-year-old male here for follow-up on his chronic conditions.  His blood pressure has been well controlled.  He continues to have tachycardia but it is asymptomatic.  On review of systems he has not been having any palpitations or chest pain or shortness of breath.  He is taking his medications regularly and tolerating them well.  Patient continues to have pain in his phantom limb and in his low back, he feels like the pain is stable and fairly well controlled with his current medication regiment.  On previous lab testing done because of resistant hypertension and sinus tachycardia, see previous notes for details, he initially had normal plasma metanephrines but then had a elevation of 1 on his most recent labs.    Past Medical History:   Diagnosis Date     History of transfusion      Patient Active Problem List   Diagnosis     Hypercholesterolemia     Chest Pain     Traumatic Amputation Of One Leg At/Above The Knee     Post-traumatic Stress Disorder     Myalgia And Myositis     Vitamin D Deficiency     Benign Essential Hypertension     Arthralgias In Multiple Sites     Back Pain     Insomnia     Esophageal Reflux     Limb Pain     Elevated liver enzymes     Chronic lower limb pain     Chronic pain of left lower extremity     Intellectual disability     History of closed head injury     Pleural effusion     Sepsis (H)     Hyponatremia     Empyema, right (H)     TY (acute kidney injury) (H)     Microcytic anemia     Generalized muscle weakness     Anemia due to blood loss, acute     Right-sided chest pain     Type 2 diabetes mellitus with complication, without long-term current use of insulin (H)     Phantom limb pain (H)     Urinary incontinence     Resistant hypertension     Sinus tachycardia     Current Outpatient Medications   Medication Sig Dispense Refill      acetaminophen (TYLENOL) 500 MG tablet Take 1 tablet (500 mg total) by mouth every 6 (six) hours as needed for pain. TAKE ONE OR TWO TABLETS BY MOUTH EVERY SIX HOURS AS NEEDED 100 tablet 11     amLODIPine (NORVASC) 10 MG tablet Take 1 tablet (10 mg total) by mouth daily. 90 tablet 3     atenoloL (TENORMIN) 50 MG tablet Take 1 tablet (50 mg total) by mouth daily. 90 tablet 3     cholecalciferol, vitamin D3, (VITAMIN D3) 50 mcg (2,000 unit) capsule Take 1 capsule (2,000 Units total) by mouth daily. 90 capsule 3     gabapentin (NEURONTIN) 300 MG capsule Take 1 capsule (300 mg total) by mouth 2 (two) times a day. 180 capsule 3     losartan (COZAAR) 100 MG tablet Take 1 tablet (100 mg total) by mouth daily. 90 tablet 3     metFORMIN (GLUCOPHAGE) 500 MG tablet Take 1 tablet (500 mg total) by mouth 2 (two) times a day with meals. 180 tablet 3     No current facility-administered medications for this visit.      Social History     Tobacco Use   Smoking Status Former Smoker     Last attempt to quit: 2015     Years since quittin.4   Smokeless Tobacco Former User   Tobacco Comment     betel nut with tobacco       OBJECTICE: /70 (Patient Site: Right Arm, Patient Position: Sitting, Cuff Size: Adult Regular)   Pulse (!) 133   SpO2 98%      Recent Results (from the past 24 hour(s))   Glycosylated Hemoglobin A1c    Collection Time: 20  3:28 PM   Result Value Ref Range    Hemoglobin A1c 7.0 (H) 3.5 - 6.0 %        GEN-alert, appropriate, in no apparent distress   HEENT-mucous membranes are moist, sclera are clear   CV-regular tachycardia, no murmur, when I examined him his heart rate was in the 110s.   RESP-lungs clear to auscultation   EXTREM-no edema of the right ankle, status post left leg amputation.   Neurologic-cranial nerves II through XII are intact, strength and sensation are intact.   Skin-no ulcers or vesicles.      Wagner Lamar

## 2021-06-09 NOTE — TELEPHONE ENCOUNTER
LM w/ daughter to have pt call [facility]. Will wait for pt to call. Will follow order from [facility].

## 2021-06-10 NOTE — PROGRESS NOTES
ASSESMENT AND PLAN:  Diagnoses and all orders for this visit:    Benign Essential Hypertension  Well-controlled.  Reviewed his most recent labs which included a conference of metabolic panel which showed normal creatinine and electrolytes.  We also discussed his mildly elevated liver enzymes and history of negative hepatitis B and hepatitis C testing.  We will plan to recheck another conference of metabolic panel in the fall.    Chronic lower limb pain  Well-controlled with combination of gabapentin and naproxen.  He has used very occasional oxycodone acetaminophen in the past.    Hyperglycemia  -     Glycosylated Hemoglobin A1c    Screening for malignant neoplasm of colon  Discussed options today with the patient.  He would like to proceed with stool DNA testing.  -     Cologuard          SUBJECTIVE: 56-year-old male comes in today for follow-up.  He reports that his chronic leg pain has been under good control with a combination of gabapentin and naproxen.  He has not had to use the oxycodone acetaminophen recently.  Overall, he is happy with his current medications.  His blood pressure has also been under good control.  His most recent labs did show a borderline elevation in his blood sugar.  He is agreeable with getting an A1c done today.  He also has never had colon cancer screening done and we discussed those options today as detailed above.    No past medical history on file.  Patient Active Problem List   Diagnosis     Hypercholesterolemia     Chest Pain     Traumatic Amputation Of One Leg At/Above The Knee     Post-traumatic Stress Disorder     Myalgia And Myositis     Vitamin D Deficiency     Benign Essential Hypertension     Arthralgias In Multiple Sites     Back Pain     Insomnia     Esophageal Reflux     Limb Pain     Elevated liver enzymes     Chronic lower limb pain     Current Outpatient Prescriptions   Medication Sig Dispense Refill     gabapentin (NEURONTIN) 300 MG capsule TAKE ONE CAPSULE BY MOUTH  "TWICE DAILY 60 capsule 5     losartan (COZAAR) 50 MG tablet TAKE 1 TABLET (50 MG TOTAL) BY MOUTH DAILY. 30 tablet 5     MAPAP, ACETAMINOPHEN, 325 mg tablet TAKE ONE OR TWO TABLETS BY MOUTH EVERY SIX HOURS AS NEEDED 120 tablet 5     metoprolol tartrate (LOPRESSOR) 50 MG tablet TAKE ONE TABLET BY MOUTH EVERY DAY AS DIRECTED 30 tablet 5     naproxen (NAPROSYN) 500 MG tablet Take 1 tablet (500 mg total) by mouth 2 (two) times a day with meals. 60 tablet 6     VITAMIN D3 2,000 unit cap TAKE ONE CAPSULE BY MOUTH EVERY DAY 30 each 5     capsaicin (ZOSTRIX) 0.025 % cream APPLY TO AFFECTED AREA THREE TIMES DAILY -APPLY AFTER LIDICAINE 60 g 5     oxyCODONE-acetaminophen (PERCOCET) 5-325 mg per tablet Take 1 tablet by mouth every 6 (six) hours as needed for pain (for severe breakthrough pain only). 20 tablet 0     No current facility-administered medications for this visit.      History   Smoking Status     Former Smoker   Smokeless Tobacco     Former User     Comment:  betel nut with tobacco       OBJECTICE: /74 (Patient Site: Right Arm, Patient Position: Sitting, Cuff Size: Adult Large)  Pulse 80  Temp 98.5  F (36.9  C) (Oral)   Resp 24  Ht 5' 2\" (1.575 m)  Wt 178 lb 8 oz (81 kg)  BMI 32.65 kg/m2     No results found for this or any previous visit (from the past 24 hour(s)).     GEN-alert, appropriate, in no apparent distress   CV-Regular rate and rhythm with no murmur    RESP-lungs clear to auscultation   ABDOMINAL-soft and nontender   EXTREM-left leg amputation, right leg no ankle edema   SKIN-no vesicles or ulcers      Wagner Lamar          "

## 2021-06-11 NOTE — TELEPHONE ENCOUNTER
----- Message from Wagner Lamar MD sent at 9/2/2020  1:49 PM CDT -----  CT scan has still not been completed, please check with patient and family on this.  Thank you.

## 2021-06-11 NOTE — TELEPHONE ENCOUNTER
I would prefer to have the CT scan done before then so that we can review the results and come up with a plan at that visit in October.  Please work with the patient's family to get the CT scan scheduled sometime this month.

## 2021-06-11 NOTE — TELEPHONE ENCOUNTER
Pt has follow up appt with Dr. Lamar in Oct. Daughter stated on that appt they will let specialty setup appt for CT scan.

## 2021-06-11 NOTE — TELEPHONE ENCOUNTER
Spoke w/ daughter, call soft xferred to [facility] for scheduling:    Sep 19, 2020 11:00 AM CDT   (Arrive by 10:45 AM)   CT Abdomen Pelvis Without Oral With IV Contrast with JN CT 2   [facility] ([facility])  [address] [place] [id number] [phone]

## 2021-06-13 NOTE — PROGRESS NOTES
Assessment: /    Plan:    1. Limb Pain  naproxen (NAPROSYN) 500 MG tablet   2. Benign Essential Hypertension  losartan (COZAAR) 50 MG tablet    metoprolol tartrate (LOPRESSOR) 50 MG tablet   3. Arthralgias In Multiple Sites  gabapentin (NEURONTIN) 300 MG capsule   4. Backache  gabapentin (NEURONTIN) 300 MG capsule   5. Vitamin D Deficiency  cholecalciferol, vitamin D3, (VITAMIN D3) 2,000 unit capsule       I completed a disability parking form, based on need to use a wheelchair because of left above-knee amputation.  I refilled his medications.  He has a follow-up appointment with Dr. Lamar on 11/29/17.  Recheck sooner if any problems.  Patient was seen with Estrellita Currie.  This was a 25 minute visit with >50% of the time spent in counseling and coordination of care regarding: Limb pain, hypertension, back pain.    Subjective:    HPI:  Schuylerbaudilio Goodson is a 57-year-old male presenting for follow-up of limb pain.  He has a left above-knee amputation, and has pain in both legs.  He also needs refill of medications.  He takes naproxen for leg pain.  He takes gabapentin for back pain and arthralgias.      Social Hx: He is accompanied today by his daughter.    Review of Systems: No fever or cough.      Current Outpatient Prescriptions   Medication Sig Dispense Refill     capsaicin (ZOSTRIX) 0.025 % cream APPLY TO AFFECTED AREA THREE TIMES DAILY -APPLY AFTER LIDICAINE 60 g 5     cholecalciferol, vitamin D3, (VITAMIN D3) 2,000 unit capsule TAKE ONE CAPSULE BY MOUTH EVERY DAY 30 capsule 5     gabapentin (NEURONTIN) 300 MG capsule TAKE ONE CAPSULE BY MOUTH TWICE DAILY 60 capsule 5     losartan (COZAAR) 50 MG tablet TAKE 1 TABLET (50 MG TOTAL) BY MOUTH DAILY. 30 tablet 5     MAPAP, ACETAMINOPHEN, 325 mg tablet TAKE ONE OR TWO TABLETS BY MOUTH EVERY SIX HOURS AS NEEDED 120 tablet 5     metoprolol tartrate (LOPRESSOR) 50 MG tablet TAKE ONE TABLET BY MOUTH EVERY DAY AS DIRECTED 30 tablet 5     naproxen (NAPROSYN) 500 MG  tablet TAKE 1 TABLET BY MOUTH TWICE DAILY WITH MEALS 60 tablet 5     oxyCODONE-acetaminophen (PERCOCET) 5-325 mg per tablet Take 1 tablet by mouth every 6 (six) hours as needed for pain (for severe breakthrough pain only). 20 tablet 0     No current facility-administered medications for this visit.          Objective:    Vitals:    09/13/17 1519   BP: 130/84   Pulse: 80       Gen:  NAD, VSS.  He is seated in a wheelchair.  Lungs:  normal  Heart:  normal  Left leg with no abnormality of the stump.        ADDITIONAL HISTORY SUMMARIZED (2): None.  DECISION TO OBTAIN EXTRA INFORMATION (1): None.   RADIOLOGY TESTS (1): None.  LABS (1): None.  MEDICINE TESTS (1): None.  INDEPENDENT REVIEW (2 each): None.     Total Data Points: 0

## 2021-06-13 NOTE — PROGRESS NOTES
Clinic Care Coordination Contact  Community Health Worker Initial Outreach    CHW Initial Information Gathering:  Referral Source: PCP  Preferred Hospital: [facility]  [phone]  Preferred Urgent Care: [facility], [phone]  Current living arrangement:: I live in a private home with family  Type of residence:: Private home - stairs  Community Resources: None  Supplies Currently Used at Home: None  Equipment Currently Used at Home: none  Informal Support system:: Children  No PCP office visit in Past Year: No  Transportation means:: Medical transport  CHW Additional Questions  If ED/Hospital discharge, follow-up appointment scheduled as recommended?: N/A  MyChart active?: No  Patient agreeable to assistance with activating MyChart?: No    Patient accepts CC: Yes. Patient scheduled for assessment with CCC RN on 01/06/2021 at 3:00 PM. Patient noted desire to discuss medical and coordination.     PCP's referral notes: Patient has resistant hypertension and elevated metanephrines on blood testing and needs to have a CT of the adrenal glands that I had ordered previously but has never been completed. Despite multiple attempts at follow-up, we have been unsuccessful at getting him the CT scan completed and then follow-up with me here in the clinic. He needs follow-up care coordination.

## 2021-06-14 NOTE — PROGRESS NOTES
Clinic Care Coordination Contact    - Writer was able to reach patient and daughter- Deniz Hawkins via phone today.     - Patient agrees to come see Dr Lamar tomorrow at 11am.   - Plans to do RN assessment on 1/6/2021 as scheduled.

## 2021-06-14 NOTE — PROGRESS NOTES
ASSESMENT AND PLAN:  Diagnoses and all orders for this visit:    Benign Essential Hypertension, uncontrolled, off medication  Restart metoprolol as ordered below.  -     metoprolol tartrate (LOPRESSOR) 50 MG tablet; TAKE ONE TABLET BY MOUTH EVERY DAY AS DIRECTED  Dispense: 30 tablet; Refill: 11  -     losartan (COZAAR) 50 MG tablet; TAKE 1 TABLET (50 MG TOTAL) BY MOUTH DAILY.  Dispense: 30 tablet; Refill: 11    Arthralgias In Multiple Sites  -     gabapentin (NEURONTIN) 300 MG capsule; Take 1 capsule (300 mg total) by mouth 2 (two) times a day.  Dispense: 60 capsule; Refill: 11  -     acetaminophen (MAPAP, ACETAMINOPHEN,) 325 MG tablet; TAKE ONE OR TWO TABLETS BY MOUTH EVERY SIX HOURS AS NEEDED  Dispense: 120 tablet; Refill: 11  -     naproxen (NAPROSYN) 500 MG tablet; Take 1 tablet (500 mg total) by mouth every 12 (twelve) hours as needed (with food). TAKE 1 TABLET BY MOUTH TWICE DAILY WITH MEALS  Dispense: 60 tablet; Refill: 11    Chronic pain of left lower extremity  -     gabapentin (NEURONTIN) 300 MG capsule; Take 1 capsule (300 mg total) by mouth 2 (two) times a day.  Dispense: 60 capsule; Refill: 11  -     acetaminophen (MAPAP, ACETAMINOPHEN,) 325 MG tablet; TAKE ONE OR TWO TABLETS BY MOUTH EVERY SIX HOURS AS NEEDED  Dispense: 120 tablet; Refill: 11  -     naproxen (NAPROSYN) 500 MG tablet; Take 1 tablet (500 mg total) by mouth every 12 (twelve) hours as needed (with food). TAKE 1 TABLET BY MOUTH TWICE DAILY WITH MEALS  Dispense: 60 tablet; Refill: 11    Vitamin D deficiency  -     cholecalciferol, vitamin D3, (VITAMIN D3) 2,000 unit capsule; Take 1 capsule (2,000 Units total) by mouth daily.  Dispense: 30 capsule; Refill: 11    Need for immunization against influenza  -     Influenza, Seasonal,Quad Inj, 36+ MOS          SUBJECTIVE: 49-year-old male here for follow-up on his high blood pressure.  He brings all his medication bottles with him today.  He has been taking losartan and naproxen but ran out of all  of his other medications about 3 weeks ago.  Patient is on multiple medications for chronic pain.  He has a history of a traumatic amputation and of chronic back and limb pain.  Patient reports that he noticed a definite increase in the amount of daily pain that he was experiencing since running out of his gabapentin and acetaminophen.  Pain is mainly in the left extremity and is of burning quality and is sometimes made worse with positional changes.  He does get some improvement with naproxen which he takes with food and has been tolerating well.  Blood pressure is elevated.  He is not getting chest pain or out of the ordinary headaches or ankle edema.  He has been taking losartan but not metoprolol.  Previously, he had been tolerating all of his medications well.    No past medical history on file.  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 30 tablet 11     naproxen (NAPROSYN) 500 MG tablet Take 1 tablet (500 mg total) by mouth every 12 (twelve) hours as needed (with food). TAKE 1 TABLET BY MOUTH TWICE DAILY WITH MEALS 60 tablet 11     acetaminophen (MAPAP, ACETAMINOPHEN,) 325 MG tablet TAKE ONE OR TWO TABLETS BY MOUTH EVERY SIX HOURS AS NEEDED 120 tablet 11     capsaicin (ZOSTRIX) 0.025 % cream APPLY TO AFFECTED AREA THREE TIMES DAILY -APPLY AFTER LIDICAINE 60 g 5     cholecalciferol, vitamin D3, (VITAMIN D3) 2,000 unit capsule Take 1 capsule (2,000 Units total) by mouth  daily. 30 capsule 11     gabapentin (NEURONTIN) 300 MG capsule Take 1 capsule (300 mg total) by mouth 2 (two) times a day. 60 capsule 11     metoprolol tartrate (LOPRESSOR) 50 MG tablet TAKE ONE TABLET BY MOUTH EVERY DAY AS DIRECTED 30 tablet 11     No current facility-administered medications for this visit.      History   Smoking Status     Former Smoker   Smokeless Tobacco     Former User     Comment:  betel nut with tobacco       OBJECTICE: BP (!) 124/100  Pulse (!) 108  Temp 99  F (37.2  C) (Oral)   Resp 18     No results found for this or any previous visit (from the past 24 hour(s)).     GEN-alert, appropriate, in no apparent distress   HEENT-mucous membranes are moist, sclera are clear   CV-regular rate and rhythm with no murmur   RESP-lungs clear to auscultation   ABDOMINAL-soft and nontender   EXTREM-no pitting edema   SKIN-no ulcers or vesicles      Wagner Lamar

## 2021-06-14 NOTE — PROGRESS NOTES
ASSESMENT AND PLAN:  Diagnoses and all orders for this visit:    Resistant hypertension  Increase atenolol.  Patient meeting with our specialty scheduling team today to help coordinate the CT scan.  Counseling done with the patient and his family with a professional  on the importance of getting this completed, he had not gotten it completed when it was previously ordered he had missed the appointments.  -     atenoloL (TENORMIN) 100 MG tablet; Take 1 tablet (100 mg total) by mouth daily.  Dispense: 90 tablet; Refill: 3  -     Basic Metabolic Panel  -     CT Abdomen Pelvis Without Oral With IV Contrast; Future; Expected date: 12/31/2020    Sinus tachycardia  Increasing atenolol as detailed above.    Elevated plasma metanephrines  pLan as detailed above.  -     CT Abdomen Pelvis Without Oral With IV Contrast; Future; Expected date: 12/31/2020    Type 2 diabetes mellitus with complication, without long-term current use of insulin (H)  Stable.  -     Glycosylated Hemoglobin A1c done today shows good control.  Continue current treatment plan.    Hypercholesteremia  Viewed previous lab results with the patient.  He has had side effects reactions with statins in the past were going to Zetia instead.  -     ezetimibe (ZETIA) 10 mg tablet; Take 1 tablet (10 mg total) by mouth daily.  Dispense: 30 tablet; Refill: 11    Other orders  -     Influenza, Recombinant, Inj, Quadrivalent, PF, 18+YRS        Reviewed the risks and benefits of the treatment plan with the patient and/or caregiver and we discussed indications for routine and emergent follow-up.        SUBJECTIVE: 60-year-old male who missed to follow-up on his resistant hypertension, tachycardia, and elevated plasma metanephrines concerning for possible pheochromocytoma.  CT scan had been ordered previously after discussion with radiologist-see previous notes for details.  However, the patient did not make it to those appointments.  He now has a care  coordinator to help coordinate follow-up and we are getting this rescheduled as detailed above.  Patient reports that he has not been having any palpitations or chest pain or shortness of breath.  He has been taking his medications regularly and his diabetes has been under good control.    Past Medical History:   Diagnosis Date     History of transfusion      Patient Active Problem List   Diagnosis     Hypercholesterolemia     Chest Pain     Traumatic Amputation Of One Leg At/Above The Knee     Post-traumatic Stress Disorder     Myalgia And Myositis     Vitamin D Deficiency     Benign Essential Hypertension     Arthralgias In Multiple Sites     Back Pain     Insomnia     Esophageal Reflux     Limb Pain     Elevated liver enzymes     Chronic lower limb pain     Chronic pain of left lower extremity     Intellectual disability     History of closed head injury     Pleural effusion     Sepsis (H)     Hyponatremia     Empyema, right (H)     TY (acute kidney injury) (H)     Microcytic anemia     Generalized muscle weakness     Anemia due to blood loss, acute     Right-sided chest pain     Type 2 diabetes mellitus with complication, without long-term current use of insulin (H)     Phantom limb pain (H)     Urinary incontinence     Resistant hypertension     Sinus tachycardia     Current Outpatient Medications   Medication Sig Dispense Refill     acetaminophen (TYLENOL) 500 MG tablet Take 1 tablet (500 mg total) by mouth every 6 (six) hours as needed for pain. TAKE ONE OR TWO TABLETS BY MOUTH EVERY SIX HOURS AS NEEDED 100 tablet 11     amLODIPine (NORVASC) 10 MG tablet Take 1 tablet (10 mg total) by mouth daily. 90 tablet 3     cholecalciferol, vitamin D3, (VITAMIN D3) 50 mcg (2,000 unit) capsule Take 1 capsule (2,000 Units total) by mouth daily. 90 capsule 3     gabapentin (NEURONTIN) 300 MG capsule Take 1 capsule (300 mg total) by mouth 2 (two) times a day. 180 capsule 3     losartan (COZAAR) 100 MG tablet Take 1 tablet  "(100 mg total) by mouth daily. 90 tablet 3     metFORMIN (GLUCOPHAGE) 500 MG tablet Take 1 tablet (500 mg total) by mouth 2 (two) times a day with meals. 180 tablet 3     atenoloL (TENORMIN) 100 MG tablet Take 1 tablet (100 mg total) by mouth daily. 90 tablet 3     ezetimibe (ZETIA) 10 mg tablet Take 1 tablet (10 mg total) by mouth daily. 30 tablet 11     No current facility-administered medications for this visit.      Social History     Tobacco Use   Smoking Status Former Smoker     Quit date: 2015     Years since quittin.9   Smokeless Tobacco Former User   Tobacco Comment     betel nut with tobacco       OBJECTICE: /80   Pulse (!) 122   Temp 97.8  F (36.6  C) (Oral)   Resp 24   Ht 5' 5\" (1.651 m)   Wt 169 lb (76.7 kg)   SpO2 96%   BMI 28.12 kg/m       Recent Results (from the past 24 hour(s))   Glycosylated Hemoglobin A1c    Collection Time: 20 11:52 AM   Result Value Ref Range    Hemoglobin A1c 6.7 (H) <=5.6 %        GEN-alert, appropriate, in no apparent distress   HEENT-mucous membranes are moist, sclera are clear   CV- regular tachycardia, no murmur   RESP-lungs clear to auscultation   ABDOMINAL-soft, nontender, no palpable mass   EXTREM-no right ankle edema, left leg is amputated.  Prosthesis in place.       Wagner Lamar"

## 2021-06-15 PROBLEM — G89.29 CHRONIC LOWER LIMB PAIN: Status: ACTIVE | Noted: 2017-05-30

## 2021-06-15 PROBLEM — M79.606 CHRONIC LOWER LIMB PAIN: Status: ACTIVE | Noted: 2017-05-30

## 2021-06-15 NOTE — PROGRESS NOTES
My next outreach with him is on 3/24/2021 and I will review his chart and put him on maintenance if no concern.
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
## 2021-06-15 NOTE — PROGRESS NOTES
Clinic Care Coordination Contact    Community Health Worker Follow Up    Goals:   Goals Addressed                 This Visit's Progress      Other (pt-stated)   40%     Goal Statement: I will attend my with PCP the next 90 days.     Date Goal set: 1/6/2021  Barriers: language barrier, no show concerns  Strengths: good family support, pca service  Date to Achieve By: 2/6/2021  Patient expressed understanding of goal: Yes    Action steps to achieve this goal:  1) I will attend my follow up appointment with PCP as scheduled on 3/11/2021.   2) I will ask my daughter/PCA to contact the clinic CHW for coordination assistance when needed.   3) I will continue taking my current medications daily as directed.       Goal update: 02/10/2021.        CHW conversation with patient:  -CHW spoke with patient who expressed no new concern at this time and able to get all medications refill when needed.  -Patient is receiving PCA services 6 hours and 45 minutes daily and receives disability benefits monthly.  -Patient is a [place] citizen and patient does not need immediate coordination assistance as this time.  -CHW informed patient and caregiver to call with questions or concerns and additional resources needed.       CHW Next Follow-up: In one month.

## 2021-06-15 NOTE — PROGRESS NOTES
ASSESMENT AND PLAN:  Diagnoses and all orders for this visit:    Resistant hypertension  Now under good control.  Continue current medications.  He continues to have significant sinus tachycardia but is completely asymptomatic, has been sinus tachycardia on previous EKGs.  In the future, we could consider switching his atenolol to propanolol to see if this gives any better improvement but given his current blood pressure regimen is working well for the first time will not make a change now.  Recheck in 2 months.  Reviewed the results of the CT scan that was negative for adrenal Issues or other concerns with the patient with the above impression .    Type 2 diabetes mellitus with complication, without long-term current use of insulin (H)  Last A1c was at goal.  Follow-up again in 2 months, blood work at that time.    Phantom limb pain (H)  Stable, chronic.  Continue current treatment plan.      Reviewed the risks and benefits of the treatment plan with the patient and/or caregiver and we discussed indications for routine and emergent follow-up.        SUBJECTIVE: 60-year-old male here for follow-up after completing his CT scan for resistant hypertension and elevated plasma metanephrines.  CT scan was negative and reviewed those results today.  His blood pressure has shown significant improvement.  He brings all of his medications with him today and has been taking his medication as prescribed, including the new increased dose of atenolol.  He is tolerating the medication well.  On review of systems, no chest pain, no shortness of breath, no palpitations.    Past Medical History:   Diagnosis Date     History of transfusion      Patient Active Problem List   Diagnosis     Hypercholesterolemia     Chest Pain     Traumatic Amputation Of One Leg At/Above The Knee     Post-traumatic Stress Disorder     Myalgia And Myositis     Vitamin D Deficiency     Benign Essential Hypertension     Arthralgias In Multiple  Sites     Back Pain     Insomnia     Esophageal Reflux     Limb Pain     Elevated liver enzymes     Chronic lower limb pain     Chronic pain of left lower extremity     Intellectual disability     History of closed head injury     Pleural effusion     Sepsis (H)     Hyponatremia     Empyema, right (H)     TY (acute kidney injury) (H)     Microcytic anemia     Generalized muscle weakness     Anemia due to blood loss, acute     Right-sided chest pain     Type 2 diabetes mellitus with complication, without long-term current use of insulin (H)     Phantom limb pain (H)     Urinary incontinence     Resistant hypertension     Sinus tachycardia     Current Outpatient Medications   Medication Sig Dispense Refill     acetaminophen (TYLENOL) 500 MG tablet Take 1 tablet (500 mg total) by mouth every 6 (six) hours as needed for pain. TAKE ONE OR TWO TABLETS BY MOUTH EVERY SIX HOURS AS NEEDED 100 tablet 11     amLODIPine (NORVASC) 10 MG tablet Take 1 tablet (10 mg total) by mouth daily. 90 tablet 3     atenoloL (TENORMIN) 100 MG tablet Take 1 tablet (100 mg total) by mouth daily. 90 tablet 3     ezetimibe (ZETIA) 10 mg tablet Take 1 tablet (10 mg total) by mouth daily. 30 tablet 11     gabapentin (NEURONTIN) 300 MG capsule Take 1 capsule (300 mg total) by mouth 2 (two) times a day. 180 capsule 3     losartan (COZAAR) 100 MG tablet Take 1 tablet (100 mg total) by mouth daily. 90 tablet 3     metFORMIN (GLUCOPHAGE) 500 MG tablet Take 1 tablet (500 mg total) by mouth 2 (two) times a day with meals. 180 tablet 3     cholecalciferol, vitamin D3, (VITAMIN D3) 50 mcg (2,000 unit) capsule Take 1 capsule (2,000 Units total) by mouth daily. 90 capsule 3     No current facility-administered medications for this visit.      Social History     Tobacco Use   Smoking Status Former Smoker     Quit date: 2015     Years since quittin.1   Smokeless Tobacco Former User   Tobacco Comment     betel nut with tobacco       OBJECTICE: /76  "  Pulse (!) 137   Temp 98.4  F (36.9  C) (Oral)   Resp 24   Ht 5' 5\" (1.651 m)   Wt 170 lb (77.1 kg)   SpO2 95%   BMI 28.29 kg/m       No results found for this or any previous visit (from the past 24 hour(s)).     CV-regular tachycardia with no murmur   RESP-lungs clear to auscultation   ABDOMINAL-soft and nontender       Wagner Lamar          "

## 2021-06-15 NOTE — PROGRESS NOTES
Clinic Care Coordination Contact    Follow Up Progress Note      Assessment: follow up on goals  - Chart review completed today.   - unable to reach patient via phoen x2.   - Only 1 goal left to work on to attend his appt with PCP scheduled on 3/11/2021.     Goals addressed this encounter:   Goals Addressed                 This Visit's Progress       Patient Stated      Other (pt-stated)   50%     Goal Statement: I will attend my with PCP the next 90 days.     Date Goal set: 1/6/2021  Barriers: language barrier, no show concerns  Strengths: good family support, pca service  Date to Achieve By: 2/6/2021  Patient expressed understanding of goal: Yes    Action steps to achieve this goal:  1) I will attend my follow up appointment with PCP as scheduled on 3/11/2021.   2) I will ask my daughter/PCA to contact the clinic CHW for coordination assistance when needed.   3) I will continue taking my current medications daily as directed.       Goal update: 02/10/2021.            Plan:   1) Patient will attend his appt with PCP on 3/11/2021.   2) CCC RN will follow up post PCP appt

## 2021-06-16 PROBLEM — I1A.0 RESISTANT HYPERTENSION: Status: ACTIVE | Noted: 2019-12-17

## 2021-06-16 PROBLEM — E87.1 HYPONATREMIA: Status: ACTIVE | Noted: 2018-06-22

## 2021-06-16 PROBLEM — M79.605 CHRONIC PAIN OF LEFT LOWER EXTREMITY: Status: ACTIVE | Noted: 2017-11-29

## 2021-06-16 PROBLEM — G54.6 PHANTOM LIMB PAIN (H): Status: ACTIVE | Noted: 2018-10-22

## 2021-06-16 PROBLEM — F79 INTELLECTUAL DISABILITY: Status: ACTIVE | Noted: 2018-01-15

## 2021-06-16 PROBLEM — G89.29 CHRONIC PAIN OF LEFT LOWER EXTREMITY: Status: ACTIVE | Noted: 2017-11-29

## 2021-06-16 PROBLEM — Z87.820 HISTORY OF CLOSED HEAD INJURY: Status: ACTIVE | Noted: 2018-01-15

## 2021-06-16 PROBLEM — R00.0 SINUS TACHYCARDIA: Status: ACTIVE | Noted: 2019-12-17

## 2021-06-16 PROBLEM — D50.9 MICROCYTIC ANEMIA: Status: ACTIVE | Noted: 2018-07-02

## 2021-06-16 PROBLEM — J90 PLEURAL EFFUSION: Status: ACTIVE | Noted: 2018-06-22

## 2021-06-16 PROBLEM — R32 URINARY INCONTINENCE: Status: ACTIVE | Noted: 2019-08-14

## 2021-06-16 PROBLEM — A41.9 SEPSIS (H): Status: ACTIVE | Noted: 2018-06-22

## 2021-06-16 NOTE — PROGRESS NOTES
Clinic Care Coordination Contact  CCC RN reviewed chart and approved for maintenance:  This Maintenance Wellness Plan provides private information in regard to the work I have done with my Care Team at my Primary Care Clinic.  This document provides insight on the goals I have worked hard to achieve.  My Care Team congratulates me on my journey to become well.  With the assistance of the Clinic Care Coordination Team and my Primary Care Provider, I have succeeded in improving areas of my health that I identified as barriers to becoming well.  I will continue to seek wellness and use the skills I have obtained to further my journey.  My Community Health Worker will follow up with me in 2 months.  In the meantime, if I should have any questions or concerns I will contact my Community Health Worker.

## 2021-06-16 NOTE — PROGRESS NOTES
Clinic Care Coordination Contact    Situation: Patient chart reviewed by care coordinator.    Background:   Schuyler Kwok CHW 8 days ago        Clinic Care Coordination Contact  Patient has completed all goals with Clinic Care Coordination.  Please review the chart and confirm if maintenance  is approved.     -CHW called and spoke with patient who expressed no new concern and doing well.  -Patient reported has all medications available at home.  -Patient continues receiving PCA services with no change and receiving Disability benefits.  -Patient does not need immediate coordination assistance and no additional resource needed.  -Patient and daughter/PCA were informed to call with questions or concerns.             Assessment:   - Chart review completed today.   - No ED or hospitalization the past 60 days.   - Last seen by PCP on 3/11/2021 with no new concerns. Recommended to return in 2 months.   - follow up appt scheduled with PCP on 5/18/2021 at 11:00am.   - Patient was referral to CCC by Dr Lamar to assist with CT scan appt. CT scan was completd on 1/12/2021 with normal result.       Plan/Recommendations:   Okay to move to maintenance.   CCC RN will review A1c result prior to graduation.

## 2021-06-16 NOTE — PROGRESS NOTES
Clinic Care Coordination Contact  Patient has completed all goals with Clinic Care Coordination.  Please review the chart and confirm if maintenance  is approved.    -CHW called and spoke with patient who expressed no new concern and doing well.  -Patient reported has all medications available at home.  -Patient continues receiving PCA services with no change and receiving Disability benefits.  -Patient does not need immediate coordination assistance and no additional resource needed.  -Patient and daughter/PCA were informed to call with questions or concerns.

## 2021-06-16 NOTE — PROGRESS NOTES
Clinic Care Coordination Contact    Community Health Worker Follow Up    Goals:   Goals Addressed                 This Visit's Progress      COMPLETED: Other (pt-stated)   100%     Goal Statement: I will attend my with PCP the next 90 days.     Date Goal set: 1/6/2021  Barriers: language barrier, no show concerns  Strengths: good family support, pca service  Date to Achieve By: 2/6/2021  Patient expressed understanding of goal: Yes    Action steps to achieve this goal:  1) I went to my follow up appointment with PCP as scheduled and next follow up appointment is scheduled in May 2021.  2) I will continue taking my current medications as prescribed and I have all of my medications.  3) My daughter/PCA will help with medications refill when needed.  4) My daughter/PCA will contact the clinic or CHW for coordination assistance if needed.         Goal completed: 03/24/2021

## 2021-06-17 NOTE — PROGRESS NOTES
ASSESMENT AND PLAN:  Diagnoses and all orders for this visit:    Diabetes mellitus, type 2 (H)  -     Glycosylated Hemoglobin A1c done today comes back at 7.0 showing ongoing good control.  Patient congratulated on his ongoing good control and will follow up again in 4 months.  Continue current treatment plan.  Counseled on the importance of and safety of Covid vaccination but patient currently refuses.  -     JIC RED    Resistant hypertension  Now well controlled.  Has been worked up extensively, see recent notes for details.  Continue current medications.    Sinus tachycardia  Asymptomatic.  Would consider switching from atenolol to propanolol in the future as a possible remedy.  Will check to make sure he is not hyperthyroid.  -     Thyroid [place]        Reviewed the risks and benefits of the treatment plan with the patient and/or caregiver and we discussed indications for routine and emergent follow-up.        SUBJECTIVE: 60-year-old male comes in for follow-up.  Has been doing very well.  No new issues or concerns.  No palpitations, no chest pain, no shortness of breath.  Patient has some suspicions and worries about the Covid vaccine and reluctance to vaccinate.    Past Medical History:   Diagnosis Date     History of transfusion      Patient Active Problem List   Diagnosis     Hypercholesterolemia     Chest Pain     Traumatic Amputation Of One Leg At/Above The Knee     Post-traumatic Stress Disorder     Myalgia And Myositis     Vitamin D Deficiency     Benign Essential Hypertension     Arthralgias In Multiple Sites     Back Pain     Insomnia     Esophageal Reflux     Limb Pain     Elevated liver enzymes     Chronic lower limb pain     Chronic pain of left lower extremity     Intellectual disability     History of closed head injury     Pleural effusion     Sepsis (H)     Hyponatremia     Empyema, right (H)     TY (acute kidney injury) (H)     Microcytic anemia     Generalized muscle weakness     Anemia due  to blood loss, acute     Right-sided chest pain     Type 2 diabetes mellitus with complication, without long-term current use of insulin (H)     Phantom limb pain (H)     Urinary incontinence     Resistant hypertension     Sinus tachycardia     Current Outpatient Medications   Medication Sig Dispense Refill     amLODIPine (NORVASC) 10 MG tablet Take 1 tablet (10 mg total) by mouth daily. 90 tablet 3     atenoloL (TENORMIN) 100 MG tablet Take 1 tablet (100 mg total) by mouth daily. 90 tablet 3     ezetimibe (ZETIA) 10 mg tablet Take 1 tablet (10 mg total) by mouth daily. 30 tablet 11     gabapentin (NEURONTIN) 300 MG capsule Take 1 capsule (300 mg total) by mouth 2 (two) times a day. 180 capsule 3     losartan (COZAAR) 100 MG tablet Take 1 tablet (100 mg total) by mouth daily. 90 tablet 3     metFORMIN (GLUCOPHAGE) 500 MG tablet Take 1 tablet (500 mg total) by mouth 2 (two) times a day with meals. 180 tablet 3     acetaminophen (TYLENOL) 500 MG tablet Take 1 tablet (500 mg total) by mouth every 6 (six) hours as needed for pain. TAKE ONE OR TWO TABLETS BY MOUTH EVERY SIX HOURS AS NEEDED 100 tablet 11     cholecalciferol, vitamin D3, (VITAMIN D3) 50 mcg (2,000 unit) capsule Take 1 capsule (2,000 Units total) by mouth daily. 90 capsule 3     No current facility-administered medications for this visit.      Social History     Tobacco Use   Smoking Status Former Smoker     Quit date: 2015     Years since quittin.3   Smokeless Tobacco Former User   Tobacco Comment     betel nut with tobacco       OBJECTICE: /70 (Patient Site: Left Arm, Patient Position: Sitting)   Pulse (!) 129   Temp 98.9  F (37.2  C) (Oral)   Resp 16   SpO2 100%      Recent Results (from the past 24 hour(s))   Glycosylated Hemoglobin A1c    Collection Time: 21 11:15 AM   Result Value Ref Range    Hemoglobin A1c 7.0 (H) <=5.6 %          CV-regular tachycardia without murmur   RESP-lungs clear to auscultation   Foot exam-normal.   Right foot has a palpable pedal pulse and no ulcers.  Some callus formation.  Left foot is absent because of history of amputation.    Wagner Lamar

## 2021-06-18 NOTE — PROGRESS NOTES
Name: Schuyler Goodson  Age: 57 y.o.  Gender: male  : 1960  Date of Encounter: 2018      HPI:  Schuyler Goodson is a 57 y.o.  male who presents to the clinic with    Current Medication:   Medications reviewed and updated.    Current Outpatient Prescriptions:      acetaminophen (MAPAP, ACETAMINOPHEN,) 325 MG tablet, TAKE ONE OR TWO TABLETS BY MOUTH EVERY SIX HOURS AS NEEDED, Disp: 120 tablet, Rfl: 11     capsaicin (ZOSTRIX) 0.025 % cream, APPLY TO AFFECTED AREA THREE TIMES DAILY -APPLY AFTER LIDICAINE, Disp: 60 g, Rfl: 5     cholecalciferol, vitamin D3, (VITAMIN D3) 2,000 unit capsule, Take 1 capsule (2,000 Units total) by mouth daily., Disp: 30 capsule, Rfl: 11     losartan (COZAAR) 50 MG tablet, TAKE 1 TABLET (50 MG TOTAL) BY MOUTH DAILY., Disp: 30 tablet, Rfl: 11     naproxen (NAPROSYN) 500 MG tablet, Take 1 tablet (500 mg total) by mouth every 12 (twelve) hours as needed (with food). TAKE 1 TABLET BY MOUTH TWICE DAILY WITH MEALS, Disp: 60 tablet, Rfl: 11     gabapentin (NEURONTIN) 300 MG capsule, Take 1 capsule (300 mg total) by mouth 2 (two) times a day., Disp: 60 capsule, Rfl: 11     metoprolol tartrate (LOPRESSOR) 50 MG tablet, TAKE ONE TABLET BY MOUTH EVERY DAY AS DIRECTED, Disp: 30 tablet, Rfl: 11     oxyCODONE-acetaminophen (PERCOCET) 5-325 mg per tablet, Take 1 tablet by mouth every 4 (four) hours as needed for pain., Disp: , Rfl:     Past Med / Surg History:  No past medical history on file.  No past surgical history on file.    Fam / Soc History:  No family history on file.  Social History     Social History     Marital status:      Spouse name: N/A     Number of children: N/A     Years of education: N/A     Occupational History     Not on file.     Social History Main Topics     Smoking status: Former Smoker     Smokeless tobacco: Former User      Comment:  betel nut with tobacco     Alcohol use Not on file     Drug use: Not on file     Sexual activity: Not on file     Other Topics Concern      Not on file     Social History Narrative       ROS:  14 point review of systems unremarkable except as mentioned in HPI    Objective:  Vitals: /80  Pulse (!) 126  Temp 98.8  F (37.1  C) (Oral)   Resp 14  Wt 178 lb (80.7 kg)  SpO2 95%  BMI 32.56 kg/m2    Gen: Alert, awake, well appearing  HEENT: NC, AT,   Ears:  Ear canals clear.  TMs normal appearing.  Eyes:  EOMI.  Pupils equally round and reactive to light. Conjunctivae clear.  Sclera non-icteric.  Nose:  Nasal mucosa pink, septum midline.  No sinus tenderness  Mouth:  MMM. Oropharynx clear. No tonsillar exudate. Teeth, gum, tongue and lips clear.  Neck:  Supple, FROM, No lymphandenpathy, No TM  Heart: Regular rate and rhythm; normal S1 and S2; no murmurs, gallops, or rubs.  Peripheral Vessels: Normal pulses and perfusion.  Lungs: Unlabored respirations; symmetric chest expansion; clear breath sounds.  Abdomen:  Bowel sounds present.  Soft, non tender, non distended, no guarding or rebound, no hepatosplenomegaly,  No masses palpable.  Genitalia:  Back:  Spine straight, no obvious deformities, no spinal or CVA tenderness.  Joints: Hips with full range-of-motion.  Extremities: No clubbing, cyanosis, or edema. Normal upper and lower extremities.  Skin: Normal turgor and without lesions or rashes.  Mental Status: Alert, oriented, in no distress. Mood and affect appropriate.  Neuro: Normal tone; no focal deficits appreciated.Gait and stance normal.     Pertinent results / imaging:  Reviewed     Assessment:      Plan:      Brinda Ahumada MD  6/22/2018

## 2021-06-18 NOTE — ANESTHESIA PREPROCEDURE EVALUATION
Anesthesia Evaluation      Patient summary reviewed     Airway   Mallampati: III   Pulmonary - normal exam     ROS comment: empyema                         Cardiovascular - normal exam  (+) hypertension, ,      Neuro/Psych    (+) neuromuscular disease,      Endo/Other       GI/Hepatic/Renal    (+) GERD,   chronic renal disease,     Comments: Cr 1.10     Other findings: Hb 7.9      Dental                         Anesthesia Plan  Planned anesthetic: general endotracheal  Double lumen tube  ASA 3   Induction: intravenous   Anesthetic plan and risks discussed with: patient  Anesthesia plan special considerations: video-assisted,   Post-op plan: routine recovery

## 2021-06-18 NOTE — PROGRESS NOTES
Name: Schuyler Goodson  Age: 57 y.o.  Gender: male  : 1960  Date of Encounter: 2018      HPI:  Schuyler Goodson is a 57 y.o. reema speaking male status post ATK traumatic amputee with history of HTN, chronic pain issues and intellectual disability secondary to closed head injury who presents to the clinic with complaints of shortness of breath,  chest pain and back pain for the last 2 days.  Patient reports chest pain anteriorly and low back area bilaterally. Anterior chest pain rated 6 out of 10 nothing in particular seems to make it better or worse without radiation.  Pain in the low back rated 10 out of 10 is made worse by movement.  Reports associated palpitations starting last night and unable to sleep lying flat last night due to shortness of breath.  Appetite has been decreased for the last 24 hours with associated nausea.  Denies fever, chills, head congestion, sore throat, cough, vomiting and diarrhea.  Has no history of heart disease, lung disease, blood clots or clotting disorder.     Current Medication:   Medications reviewed and updated.    Current Outpatient Prescriptions:      acetaminophen (MAPAP, ACETAMINOPHEN,) 325 MG tablet, TAKE ONE OR TWO TABLETS BY MOUTH EVERY SIX HOURS AS NEEDED, Disp: 120 tablet, Rfl: 11     capsaicin (ZOSTRIX) 0.025 % cream, APPLY TO AFFECTED AREA THREE TIMES DAILY -APPLY AFTER LIDICAINE, Disp: 60 g, Rfl: 5     cholecalciferol, vitamin D3, (VITAMIN D3) 2,000 unit capsule, Take 1 capsule (2,000 Units total) by mouth daily., Disp: 30 capsule, Rfl: 11     losartan (COZAAR) 50 MG tablet, TAKE 1 TABLET (50 MG TOTAL) BY MOUTH DAILY., Disp: 30 tablet, Rfl: 11     naproxen (NAPROSYN) 500 MG tablet, Take 1 tablet (500 mg total) by mouth every 12 (twelve) hours as needed (with food). TAKE 1 TABLET BY MOUTH TWICE DAILY WITH MEALS, Disp: 60 tablet, Rfl: 11     gabapentin (NEURONTIN) 300 MG capsule, Take 1 capsule (300 mg total) by mouth 2 (two) times a day., Disp: 60 capsule, Rfl:  11     metoprolol tartrate (LOPRESSOR) 50 MG tablet, TAKE ONE TABLET BY MOUTH EVERY DAY AS DIRECTED, Disp: 30 tablet, Rfl: 11     oxyCODONE-acetaminophen (PERCOCET) 5-325 mg per tablet, Take 1 tablet by mouth every 4 (four) hours as needed for pain., Disp: , Rfl:     Past Med / Surg History:  No past medical history on file.  No past surgical history on file.    Fam / Soc History:  No family history on file.  Social History     Social History     Marital status:      Spouse name: N/A     Number of children: N/A     Years of education: N/A     Occupational History     Not on file.     Social History Main Topics     Smoking status: Former Smoker     Smokeless tobacco: Former User      Comment:  betel nut with tobacco     Alcohol use Not on file     Drug use: Not on file     Sexual activity: Not on file     Other Topics Concern     Not on file     Social History Narrative       ROS:  14 point review of systems unremarkable except as mentioned in HPI    Objective:  Vitals: /80  Pulse (!) 126  Temp 98.8  F (37.1  C) (Oral)   Resp 14  Wt 178 lb (80.7 kg)  SpO2 95%  BMI 32.56 kg/m2    Gen: Alert, awake, well appearing sitting in wheel chair in no acute distress  HEENT: NC, AT,   Eyes:  EOMI.  Pupils equally round and reactive to light. Conjunctivae clear.  Sclera non-icteric.  Mouth:  MMM. Oropharynx clear. No tonsillar exudate. Teeth, gum, tongue and lips clear.  Neck:  Supple, FROM, No lymphandenpathy, No TM  Heart: Tachycardic with regular rhythm; normal S1 and S2; no murmurs, gallops, or rubs.  Peripheral Vessels: Normal pulses and perfusion.  Lungs: Unlabored respirations; symmetric chest expansion; crackles 2/3 up on right and in left base.  Abdomen:  Bowel sounds present.  Soft, non tender, non distended, no guarding or rebound, no hepatosplenomegaly,  No masses palpable.  Back:  Spine straight, no obvious deformities, no spinal or CVA tenderness.  Extremities: left ATK amputation. No right  clubbing, cyanosis, or edema. Normal upper extremities.  Skin: Normal turgor and without lesions or rashes.  Mental Status: Alert, oriented, in no distress. Mood and affect appropriate.  Neuro: Normal tone; no focal deficits appreciated.     Pertinent results / imaging:  EGK revealed sinus tachycardia with nonspecific abnormal ST and T waves    Assessment:  1. Chest pain 2. Dyspnea 3. Tachycardia 4.  Back pain    Plan: Reviewed EKG findings with patient and family.  With current symptoms, and physical findings advised patient requires further evaluation than can be provided in the [facility] at this time.  Recommended patient go to the ED via private car. ED notified of impending arrival and case discussed with Dr isbell at [facility].  Patient and family agreeable with plan      Brinda Ahumada MD  6/22/2018

## 2021-06-18 NOTE — ANESTHESIA POSTPROCEDURE EVALUATION
Patient: Schuyler Hamzah  THORACOSCOPY  Anesthesia type: general    Patient location: PACU  Last vitals:   Vitals:    06/26/18 2030   BP: 142/86   Pulse: 75   Resp: 18   Temp:    SpO2: 99%     Post vital signs: stable  Level of consciousness: awake and responds to simple questions  Post-anesthesia pain: pain controlled  Post-anesthesia nausea and vomiting: no  Pulmonary: unassisted, return to baseline  Cardiovascular: stable and blood pressure at baseline  Hydration: adequate  Anesthetic events: no    QCDR Measures:  ASA# 11 - Meri-op Cardiac Arrest: ASA11B - Patient did NOT experience unanticipated cardiac arrest  ASA# 12 - Meri-op Mortality Rate: ASA12B - Patient did NOT die  ASA# 13 - PACU Re-Intubation Rate: ASA13B - Patient did NOT require a new airway mgmt  ASA# 10 - Composite Anes Safety: ASA10A - No serious adverse event    Additional Notes:

## 2021-06-18 NOTE — ANESTHESIA CARE TRANSFER NOTE
Last vitals:   Vitals:    06/26/18 1940   BP: 141/83   Pulse: 77   Resp: 28   Temp: 36.1  C (97  F)   SpO2: 98%     Volatile agents turned off, muscle relaxant reversed, 4/4 twitches with sustained tetany. Pt breathing spontaneously with adequate tidal volumes, following commands, gently suctioned oropharynx, extubated without issue. Transported by CRNA and RN to recovery.      Patient's level of consciousness is awake and drowsy  Spontaneous respirations: yes  Maintains airway independently: yes  Dentition unchanged: yes  Oropharynx: oropharynx clear of all foreign objects    QCDR Measures:  ASA# 20 - Surgical Safety Checklist: WHO surgical safety checklist completed prior to induction  PQRS# 430 - Adult PONV Prevention: 4558F - Pt received => 2 anti-emetic agents (different classes) preop & intraop  ASA# 8 - Peds PONV Prevention: NA - Not pediatric patient, not GA or 2 or more risk factors NOT present  PQRS# 424 - Meri-op Temp Management: 4559F - At least one body temp DOCUMENTED => 35.5C or 95.9F within required timeframe  PQRS# 426 - PACU Transfer Protocol: - Transfer of care checklist used  ASA# 14 - Acute Post-op Pain: ASA14A - Patient experienced pain >= 7 out of 10, experiencing pain after extubation, 50mcg fentanyl given in the OR, 0.5mg dilaudid given in recovery

## 2021-06-19 NOTE — PROGRESS NOTES
ASSESMENT AND PLAN:  Diagnoses and all orders for this visit:    Empyema, right (H)  Resolved.  Status post surgical intervention and hospitalization.  Reviewed the operative note and discharge summary and medication reconciliation and review and counseling done with the patient and his family and with the help of a professional .  He will complete his full course of amoxicillin clavulanate which he is tolerating well.    Pleuritic chest pain  Secondary to the infection and intervention detailed above.  Improving with time.  We discussed a stepwise approach to his pain management with acetaminophen first-line, naproxen second line, and tramadol as his option for the more severe breakthrough pain.  He tolerates the tramadol well.  Also clarified with the patient and his family the difference between tramadol and oxycodone, he is going to use tramadol, not oxycodone.  Reviewed the risks and benefits of the tramadol as prescribed below.  -     traMADol (ULTRAM) 50 mg tablet; Take 0.5-1 tablets (25-50 mg total) by mouth every 6 (six) hours as needed.  Dispense: 20 tablet; Refill: 0    Type 2 diabetes mellitus with complication, without long-term current use of insulin-new diagnosis  Hydrate counseling done today with the patient.  He has had an initial visit with diabetes education and will talk about further diabetes education at his follow-up appointment in 6 weeks here in the clinic.  Metformin as prescribed below which he is tolerating well.  -     metFORMIN (GLUCOPHAGE) 500 MG tablet; Take 1 tablet (500 mg total) by mouth 2 (two) times a day with meals.  Dispense: 60 tablet; Refill: 11    Chronic pain of left lower extremity  Continue gabapentin with as needed use of acetaminophen and naproxen.    Microcytic anemia  Reviewed lab results from the hospitalization, his last hemoglobin was less than 9, will plan to recheck a CBC here in the clinic in 6 weeks.        SUBJECTIVE: 49-year-old male comes in for  follow-up on his recent hospitalization.  He had an empyema and underwent surgical intervention as well as IV antibiotics.  The IV antibiotics were switched to oral antibiotics on the day of discharge.  He has been tolerating his Augmentin well.  No diarrhea.  He feels a generalized weakness and poor appetite but these are gradually improving.  Since discharge she has not had any fever or vomiting.  He has moderate pain in his right chest wall, it worsens with movements of the chest wall.  It does interrupt his sleep and less he takes tramadol at bedtime, he was discharged on tramadol as his stronger option for pain control.  Patient reports no side effects side effects or problems with that medication.  Overall the amount of pain he is experiencing is decreasing day today.  Patient has a new diagnosis of type 2 diabetes based on his A1c of 7.9 during his hospitalization.  Since discharge he has been taking metformin twice daily and tolerating it well.    Review of systems: No shortness of breath at rest, no vomiting or diarrhea, no fever, remainder of review of systems is as above are negative.    Past Medical History:   Diagnosis Date     History of transfusion      Patient Active Problem List   Diagnosis     Hypercholesterolemia     Chest Pain     Traumatic Amputation Of One Leg At/Above The Knee     Post-traumatic Stress Disorder     Myalgia And Myositis     Vitamin D Deficiency     Benign Essential Hypertension     Arthralgias In Multiple Sites     Back Pain     Insomnia     Esophageal Reflux     Limb Pain     Elevated liver enzymes     Chronic lower limb pain     Chronic pain of left lower extremity     Intellectual disability     History of closed head injury     Pleural effusion     Sepsis (H)     Hyponatremia     Empyema, right (H)     TY (acute kidney injury) (H)     Microcytic anemia     Generalized muscle weakness     Anemia due to blood loss, acute     Right-sided chest pain     Type 2 diabetes  mellitus with complication, without long-term current use of insulin (H)     Current Outpatient Prescriptions   Medication Sig Dispense Refill     acetaminophen (MAPAP, ACETAMINOPHEN,) 325 MG tablet TAKE ONE OR TWO TABLETS BY MOUTH EVERY SIX HOURS AS NEEDED 120 tablet 11     amoxicillin-clavulanate (AUGMENTIN) 875-125 mg per tablet Take 1 tablet by mouth 2 (two) times a day for 12 days. 24 tablet 0     capsaicin (ZOSTRIX) 0.025 % cream Apply 1 application topically 2 (two) times a day.       cholecalciferol, vitamin D3, (VITAMIN D3) 2,000 unit capsule Take 1 capsule (2,000 Units total) by mouth daily. 30 capsule 11     gabapentin (NEURONTIN) 300 MG capsule Take 1 capsule (300 mg total) by mouth 2 (two) times a day. 60 capsule 11     losartan (COZAAR) 50 MG tablet TAKE 1 TABLET (50 MG TOTAL) BY MOUTH DAILY. 30 tablet 11     metFORMIN (GLUCOPHAGE) 500 MG tablet Take 1 tablet (500 mg total) by mouth 2 (two) times a day with meals. 60 tablet 11     naproxen (NAPROSYN) 500 MG tablet Take 1 tablet (500 mg total) by mouth every 12 (twelve) hours as needed (with food). TAKE 1 TABLET BY MOUTH TWICE DAILY WITH MEALS (Patient taking differently: Take 500 mg by mouth every 12 (twelve) hours as needed (with food). ) 60 tablet 11     traMADol (ULTRAM) 50 mg tablet Take 0.5-1 tablets (25-50 mg total) by mouth every 6 (six) hours as needed. 20 tablet 0     No current facility-administered medications for this visit.      History   Smoking Status     Former Smoker   Smokeless Tobacco     Former User     Comment:  betel nut with tobacco       OBJECTICE: /80 (Patient Site: Left Arm, Patient Position: Sitting, Cuff Size: Adult Regular)  Pulse 94  Temp 98.5  F (36.9  C) (Oral)   Resp 24  SpO2 97%     No results found for this or any previous visit (from the past 24 hour(s)).     GEN-alert, appropriate, in no apparent distress   HEENT-poor dentition and dental staining, oropharynx is otherwise clear.  Neck is supple with no  palpable mass or lymphadenopathy.   CV-regular rate and rhythm with no murmur   RESP-somewhat diminished breath sounds on the right side as compared to the left but breath sounds are heard throughout both lung fields.  No crackles.   ABDOMINAL-soft, nontender, no palpable masses   EXTREM-left leg above-the-knee amputation, right ankle is without any edema   SKIN-surgical lines of the right chest wall are well healing, no signs of infection.   Psychiatric-appearance is well-groomed, speech of normal fluency and rate, affect is normal, thought content negative for suicidal or homicidal ideation, thought processing negative for  paranoid or delusional thinking.      Wagner Lamar

## 2021-06-19 NOTE — PROGRESS NOTES
[facility] INFECTIOUS DISEASE CLINIC FOLLOW UP NOTE    Date: 8/2/2018  Patient Name: Schuyler Goodson   YOB: 1960  MRN: [id number]      ASSESSMENT:  57-year-old man who is here for hospital follow-up.  He was recently hospitalized with right-sided empyema, undergoing decortication on 6/26.  Cultures grew strep intermedius.  The patient completed a course of Augmentin on 7/18.  He continues to have some tenderness over his right chest, but otherwise has no respiratory symptoms.    PLAN:  -Will repeat a chest x-ray today  -Check CBC  -No antibiotics at this time    Return to clinic as needed.    Nakul Noble MD  [facility]   Clinic phone: [phone]  Clinic fax: [phone]    ______________________________________________________________________    HISTORY OF PRESENT ILLNESS:  From inpatient ID consult on 6/24:    Schuyler Goodson is a 57 y.o. old male. History is provided by the patient with the assistant of professional  and chart review.    Infectious diseases asked to see this patient for right-sided empyema.  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  Per his family members, the patient has been doing well up until 3-4 days prior to admission when he started complaining of right-sided pleuritic chest pain associated with some intermittent cough and feeling weak.  Patient was brought to the ER with the symptoms and found to have leukocytosis as well as right-sided pleural effusion.  On 6/20/2018, thoracentesis with chest tube placement was done.  Cultures are now growing gram-positive cocci.  Has been on IV vancomycin and Zosyn.      SUBJECTIVE / INTERVAL HISTORY: Schuyler Goodson returns for follow up of right-sided empyema.  He was hospitalized from 6/22-7/6.  He underwent decortication on 6/26.  He received Zosyn while inpatient.  Once his white count normalized he was transitioned to Augmentin  until 7/18.  He has done well post hospitalization.  He does complain of ongoing right-sided chest pain at the site of his surgery.  He denies shortness of breath or cough.    Patient was seen with a phone .    ROS: All other systems negative except as listed above.      Current Outpatient Prescriptions:      acetaminophen (MAPAP, ACETAMINOPHEN,) 325 MG tablet, TAKE ONE OR TWO TABLETS BY MOUTH EVERY SIX HOURS AS NEEDED, Disp: 120 tablet, Rfl: 11     cholecalciferol, vitamin D3, (VITAMIN D3) 2,000 unit capsule, Take 1 capsule (2,000 Units total) by mouth daily., Disp: 30 capsule, Rfl: 11     gabapentin (NEURONTIN) 300 MG capsule, Take 1 capsule (300 mg total) by mouth 2 (two) times a day., Disp: 60 capsule, Rfl: 11     losartan (COZAAR) 50 MG tablet, TAKE 1 TABLET (50 MG TOTAL) BY MOUTH DAILY., Disp: 30 tablet, Rfl: 11     metFORMIN (GLUCOPHAGE) 500 MG tablet, Take 1 tablet (500 mg total) by mouth 2 (two) times a day with meals., Disp: 60 tablet, Rfl: 11     naproxen (NAPROSYN) 500 MG tablet, Take 1 tablet (500 mg total) by mouth every 12 (twelve) hours as needed (with food). TAKE 1 TABLET BY MOUTH TWICE DAILY WITH MEALS (Patient taking differently: Take 500 mg by mouth every 12 (twelve) hours as needed (with food). ), Disp: 60 tablet, Rfl: 11     traMADol (ULTRAM) 50 mg tablet, Take 0.5-1 tablets (25-50 mg total) by mouth every 6 (six) hours as needed., Disp: 20 tablet, Rfl: 0     capsaicin (ZOSTRIX) 0.025 % cream, Apply 1 application topically 2 (two) times a day., Disp: , Rfl:       OBJECTIVE:  Vitals:    08/02/18 0912   BP: 114/58   Resp: 16         GEN: No acute distress.    RESPIRATORY:  Normal breathing pattern. Clear to auscultation  CARDIOVASCULAR:  Regular rate and rhythm. Normal S1 and S2. No murmur, click, gallop or rub.   ABDOMEN:  Soft, normal bowel sounds, non-tender, no masses, no organomegaly.  EXTREMITIES: No edema.  SKIN/HAIR/NAILS:  No rashes          Pertinent labs:            Lab  Results   Component Value Date    CRP 36.6 (H) 06/24/2018         Lab Results   Component Value Date    ALT 15 06/23/2018    AST 15 06/23/2018    ALKPHOS 123 (H) 06/23/2018    BILITOT 0.9 06/23/2018         MICROBIOLOGY DATA:  Reviewed    RADIOLOGY:  Reviewed

## 2021-06-20 NOTE — LETTER
Letter by Wagner Lamar MD at      Author: Wagner Lamar MD Service: -- Author Type: --    Filed:  Encounter Date: 6/30/2020 Status: (Other)        [facility] PATIENT ACCESS  [address]   [place] [address]  [phone]         Schuyler Goodson  [address]  [place] [address]        06/30/20    Dear Schuyler Goodson,     At [facility] we care about your health and well-being. Your primary care provider is committed to ensuring you receive high quality care and has chosen a network of specialists to assist in providing that care. Recently Dr. Lamar referred you to Radiology for specialty care.      Please call [facility] at [phone] at your earliest convenience for assistance in scheduling an appointment.  If you have already scheduled this appointment, please disregard this notice.  Thank you for choosing [facility] for your healthcare needs.       Sincerely,       [facility] Specialty Scheduling

## 2021-06-21 NOTE — LETTER
Letter by Dianne Helms RN at      Author: Dah, Dianne, RN Service: -- Author Type: --    Filed:  Encounter Date: 1/6/2021 Status: (Other)       Care Plan  About Me:    Patient Name:  Schuyler Goodson    YOB: 1960  Age:         60 y.o.   [facility] MRN:    [id number] Telephone Information:  Home Phone [phone]   Mobile [phone]       Address:  [address] [place] [address] Email address:  No e-mail address on record      Emergency Contact(s)  Extended Emergency Contact Information      Name: Shaw Hawkins    Home Phone Number: [phone]  Relation: Child          Primary language:  Alissa     needed? Yes   [facility]:  [phone] op. 1  Other communication barriers: Language barrier, Physical impairment  Preferred Method of Communication:     Current living arrangement: I live in a private home with family  Mobility Status/ Medical Equipment: Independent w/Device    Health Maintenance  Health Maintenance Reviewed:      My Access Plan  Medical Emergency 911   Primary Clinic Line Wagner Lamar MD - [phone]   24 Hour Appointment Line [phone] or  [phone]) (toll-free)   24 Hour Nurse Line [phone] (toll-free)   Preferred Urgent Care [facility], [phone]   [facility]  [phone]   Preferred Pharmacy [id number] - [place], [place] - [address] AT [address]     Behavioral Health Crisis Line The National Suicide Prevention Lifeline at 1-843.740.3302 or 911             My Care Team Members  Patient Care Team       Relationship Specialty Notifications Start End    Wagner Lamar MD PCP - General   1/8/13     Phone: [phone] Fax: [phone]         [address] [place] [address]    Wagner Lamar MD Assigned PCP   7/28/19     Phone: [phone] Fax: [phone]         [address] [place] [address]    Dianne Helms, ELVER Lead  Care Coordinator Primary Care - CC Admissions 1/6/21     Fax: [phone]                 My Care Plans  Self Management and Treatment Plan  Goals and (Comments)  Goals        General    Other (pt-stated)     Notes - Note created  1/6/2021  9:18 AM by Dianne Helms RN    Goal Statement: I will attend my CT scan appt the next 30 days.    Date Goal set: 1/6/2021  Barriers: language barrier, no show concerns  Strengths: good family support, pca service  Date to Achieve By: 2/6/2021  Patient expressed understanding of goal: Yes    Action steps to achieve this goal:  1. I will attend my CT appt on 1/12/2021 at 8:00am. No ride needed.   2. I will call CHW with any concerns.         Other (pt-stated)     Notes - Note created  1/11/2021  5:20 AM by Dianne Helms RN    Goal Statement: I will attend my with PCP the next 30 days.     Date Goal set: 1/6/2021  Barriers: language barrier, no show concerns  Strengths: good family support, pca service  Date to Achieve By: 2/6/2021  Patient expressed understanding of goal: Yes    Action steps to achieve this goal:  1) I will attend my follow up appt with PCP post CT scan as scheduled. TBD  2) I will call CHW with any concerns or questions.                Advance Care Plans/Directives Type:        My Medical and Care Information  Problem List   Patient Active Problem List   Diagnosis   ? Hypercholesterolemia   ? Chest Pain   ? Traumatic Amputation Of One Leg At/Above The Knee   ? Post-traumatic Stress Disorder   ? Myalgia And Myositis   ? Vitamin D Deficiency   ? Benign Essential Hypertension   ? Arthralgias In Multiple Sites   ? Back Pain   ? Insomnia   ? Esophageal Reflux   ? Limb Pain   ? Elevated liver enzymes   ? Chronic lower limb pain   ? Chronic pain of left lower extremity   ? Intellectual disability   ? History of closed head injury   ? Pleural effusion   ? Sepsis (H)   ? Hyponatremia   ? Empyema, right (H)   ? TY (acute kidney injury) (H)   ? Microcytic anemia   ?  Generalized muscle weakness   ? Anemia due to blood loss, acute   ? Right-sided chest pain   ? Type 2 diabetes mellitus with complication, without long-term current use of insulin (H)   ? Phantom limb pain (H)   ? Urinary incontinence   ? Resistant hypertension   ? Sinus tachycardia      Current Medications and Allergies:  See printed Medication Report.    Care Coordination Start Date: 1/6/2021   Frequency of Care Coordination: 6 weeks   Form Last Updated: 01/11/2021

## 2021-06-21 NOTE — LETTER
Letter by Dianne Helms RN at      Author: Dah, Dianne, RN Service: -- Author Type: --    Filed:  Encounter Date: 1/6/2021 Status: (Other)       CARE COORDINATION  [address], [address]  [place] [address]    January 6, 2021  Schuyler Hamzah  [address] [place] [address]      Dear Schuyler,    I am a clinic care coordinator who works with Wagner Lamar MD. I wanted to thank you for spending the time to talk with me.  Below is a description of clinic care coordination and how I can further assist you.      The clinic care coordination team is made up of a registered nurse,  and community health worker who understand the health care system. The goal of clinic care coordination is to help you manage your health and improve access to the health care system in the most efficient manner. The team can assist you in meeting your health care goals by providing education, coordinating services, strengthening the communication among your providers and supporting you with any resource needs.    Please feel free to contact the Community Health Worker at [phone] or [phone] with any questions or concerns. We are focused on providing you with the highest-quality healthcare experience possible and that all starts with you.     Sincerely,     Dianne Helms RN    Enclosed: I have enclosed a copy of the Care Plan. This has helpful information and goals that we have talked about. Please keep this in an easy to access place to use as needed.

## 2021-06-21 NOTE — PROGRESS NOTES
ASSESMENT AND PLAN:  Diagnoses and all orders for this visit:    Phantom limb pain (H)  I do not see any signs of ulceration or infection or other issues on his stump.  We reviewed his medications and discussed options, he is going to continue with naproxen and gabapentin which provide him with good pain control the vast majority of the time.  At this point he does not want to add additional medication and he can increase the gabapentin at bedtime if needed.    Benign Essential Hypertension  Counseling done today with the patient and his family with the help of a professional  on the risks and benefits of increasing his antihypertensive regiment.  He declines at this time.  He believes that most of the time his blood pressure is under good control but that it has not been this last few days because of pain and poor sleep.  Going to recheck here in the clinic in 1 month.    Type 2 diabetes mellitus with complication, without long-term current use of insulin (H)  -     Glycosylated Hemoglobin A1c done today shows improvement and excellent control.  Continue current treatment plan and recheck again in 4-6 months.    History of microcytic anemia  -     HM2(CBC w/o Differential)    Need for immunization against influenza  -     Influenza, Seasonal Quad, Preservative Free 36+ Months          SUBJECTIVE: 58-year-old male who has a history of phantom limb pain on the left leg distal to his stump from his previous amputation.  Over the past couple of days he has had some increased pain sensation on the end of his leg stump and just distal to that.  He describes it as a moderate to severe pain which does improve with naproxen and gabapentin.  The last couple of nights he has been taking gabapentin and naproxen together at bedtime which gives him adequate relief to fall asleep.  However, is been taking longer than usual to fall asleep and he is gotten poor sleep than usual over this last few nights.  No ulcerations  or fevers or chills.  Patient is also here to follow-up on his blood pressure which was mildly elevated previously.  He is still reluctant to increase the dose of his blood pressure medications.  He is also due for follow-up on his diabetes which has been well controlled but his last A1c had gotten higher.  He has been taking all of his medications as prescribed and tolerating them well.    Past Medical History:   Diagnosis Date     History of transfusion      Patient Active Problem List   Diagnosis     Hypercholesterolemia     Chest Pain     Traumatic Amputation Of One Leg At/Above The Knee     Post-traumatic Stress Disorder     Myalgia And Myositis     Vitamin D Deficiency     Benign Essential Hypertension     Arthralgias In Multiple Sites     Back Pain     Insomnia     Esophageal Reflux     Limb Pain     Elevated liver enzymes     Chronic lower limb pain     Chronic pain of left lower extremity     Intellectual disability     History of closed head injury     Pleural effusion     Sepsis (H)     Hyponatremia     Empyema, right (H)     TY (acute kidney injury) (H)     Microcytic anemia     Generalized muscle weakness     Anemia due to blood loss, acute     Right-sided chest pain     Type 2 diabetes mellitus with complication, without long-term current use of insulin (H)     Phantom limb pain (H)     Current Outpatient Prescriptions   Medication Sig Dispense Refill     acetaminophen (MAPAP, ACETAMINOPHEN,) 325 MG tablet TAKE ONE OR TWO TABLETS BY MOUTH EVERY SIX HOURS AS NEEDED 120 tablet 11     cholecalciferol, vitamin D3, (VITAMIN D3) 2,000 unit capsule Take 1 capsule (2,000 Units total) by mouth daily. 30 capsule 11     gabapentin (NEURONTIN) 300 MG capsule Take 1 capsule (300 mg total) by mouth 2 (two) times a day. 60 capsule 11     losartan (COZAAR) 50 MG tablet TAKE 1 TABLET (50 MG TOTAL) BY MOUTH DAILY. 30 tablet 11     metFORMIN (GLUCOPHAGE) 500 MG tablet Take 1 tablet (500 mg total) by mouth 2 (two) times  a day with meals. 60 tablet 11     metoprolol tartrate (LOPRESSOR) 50 MG tablet Take 50 mg by mouth daily.       naproxen (NAPROSYN) 500 MG tablet Take 1 tablet (500 mg total) by mouth every 12 (twelve) hours as needed (with food). TAKE 1 TABLET BY MOUTH TWICE DAILY WITH MEALS (Patient taking differently: Take 500 mg by mouth every 12 (twelve) hours as needed (with food). ) 60 tablet 11     capsaicin (ZOSTRIX) 0.025 % cream Apply 1 application topically 2 (two) times a day.       No current facility-administered medications for this visit.      History   Smoking Status     Former Smoker   Smokeless Tobacco     Former User     Comment:  betel nut with tobacco       OBJECTICE: BP (!) 156/92  Pulse 88  Temp 98.5  F (36.9  C) (Oral)   Resp 20  SpO2 98%     Recent Results (from the past 24 hour(s))   Glycosylated Hemoglobin A1c    Collection Time: 10/22/18  4:10 PM   Result Value Ref Range    Hemoglobin A1c 6.6 (H) 3.5 - 6.0 %        GEN-alert, appropriate, in no apparent distress   CV-regular rate and rhythm with no murmur   RESP-lungs clear to auscultation   ABDOMINAL-soft and nontender   EXTREM-left leg above-the-knee amputation, no right ankle edema   SKIN-no ulcerations or skin abnormalities or erythema or induration of the skin of the affected area of pain on his stump      Wagner Lamar

## 2021-06-22 NOTE — PROGRESS NOTES
ASSESMENT AND PLAN:  Diagnoses and all orders for this visit:    Right-sided chest pain  Reviewed his operative note from June with the patient with the help of a professional .  We discussed that scarring can occur after the empyema infection and surgery that he had back in June.  And that this is likely the cause of his ongoing symptoms and I expect him to continue to slowly improve.  Follow-up if worsening.  Can use naproxen or acetaminophen as prescribed below for pain management.    Benign Essential Hypertension  Counseling done today with the patient, medication review done with the patient and his family with the help of a professional .  Blood pressure has improved significantly compared to last visit.  Continue losartan and metoprolol.  -     losartan (COZAAR) 50 MG tablet; TAKE 1 TABLET(50 MG) BY MOUTH DAILY.  Dispense: 90 tablet; Refill: 3        -     metoprolol tartrate (LOPRESSOR) 50 MG tablet; Take 1 tablet (50 mg total) by mouth daily.  Dispense: 90 tablet; Refill: 3     Arthralgias In Multiple Sites  -     acetaminophen (TYLENOL) 500 MG tablet; Take 1 tablet (500 mg total) by mouth every 6 (six) hours as needed for pain TAKE ONE OR TWO TABLETS BY MOUTH EVERY SIX HOURS AS NEEDED.  Dispense: 100 tablet; Refill: 11  -     gabapentin (NEURONTIN) 300 MG capsule; Take 1 capsule (300 mg total) by mouth 2 (two) times a day.  Dispense: 180 capsule; Refill: 3  -     naproxen (NAPROSYN) 500 MG tablet; Take 1 tablet (500 mg total) by mouth every 12 (twelve) hours as needed (with food) TAKE 1 TABLET BY MOUTH TWICE DAILY WITH MEALS.  Dispense: 60 tablet; Refill: 4    Chronic pain of left lower extremity  -     acetaminophen (TYLENOL) 500 MG tablet; Take 1 tablet (500 mg total) by mouth every 6 (six) hours as needed for pain TAKE ONE OR TWO TABLETS BY MOUTH EVERY SIX HOURS AS NEEDED.  Dispense: 100 tablet; Refill: 11  -     gabapentin (NEURONTIN) 300 MG capsule; Take 1 capsule (300 mg total)  by mouth 2 (two) times a day.  Dispense: 180 capsule; Refill: 3  -     naproxen (NAPROSYN) 500 MG tablet; Take 1 tablet (500 mg total) by mouth every 12 (twelve) hours as needed (with food) TAKE 1 TABLET BY MOUTH TWICE DAILY WITH MEALS.  Dispense: 60 tablet; Refill: 4    Vitamin D deficiency  -     cholecalciferol, vitamin D3, (VITAMIN D3) 2,000 unit capsule; Take 1 capsule (2,000 Units total) by mouth daily.  Dispense: 90 capsule; Refill: 3    Type 2 diabetes mellitus with complication, without long-term current use of insulin  -     metFORMIN (GLUCOPHAGE) 500 MG tablet; Take 1 tablet (500 mg total) by mouth 2 (two) times a day with meals.  Dispense: 180 tablet; Refill: 3    Other orders    -     capsaicin (ZOSTRIX) 0.025 % cream; Apply 1 application topically 2 (two) times a day as needed.  Dispense: 60 g; Refill: 11          SUBJECTIVE: 58-year-old male here for follow-up on his blood pressure, elevated at last visit but improved today.  His main concern is pain in the right chest directly under the area of his surgical scars.  Patient had an empyema and underwent thorascopic surgery back in June.  Patient reports that he has had the pain ever since then and that each month it seems to be a little bit better.  He also has chronic pain issues related to joint pain and phantom limb pain.  He has a history of a left leg amputation.  He reports that his pain is usually well controlled with acetaminophen and gabapentin and capsaicin cream.  However, there are times when this combination is not working that he takes his naproxen.  He needs refills on all of his medications.  Patient has a history of well-controlled diabetes and tolerates his metformin twice daily.  His last A1c here in the clinic was less than 2 months ago and was very well controlled at 6.6.    Past Medical History:   Diagnosis Date     History of transfusion      Patient Active Problem List   Diagnosis     Hypercholesterolemia     Chest Pain      Traumatic Amputation Of One Leg At/Above The Knee     Post-traumatic Stress Disorder     Myalgia And Myositis     Vitamin D Deficiency     Benign Essential Hypertension     Arthralgias In Multiple Sites     Back Pain     Insomnia     Esophageal Reflux     Limb Pain     Elevated liver enzymes     Chronic lower limb pain     Chronic pain of left lower extremity     Intellectual disability     History of closed head injury     Pleural effusion     Sepsis (H)     Hyponatremia     Empyema, right (H)     TY (acute kidney injury) (H)     Microcytic anemia     Generalized muscle weakness     Anemia due to blood loss, acute     Right-sided chest pain     Type 2 diabetes mellitus with complication, without long-term current use of insulin (H)     Phantom limb pain (H)     Current Outpatient Medications   Medication Sig Dispense Refill     acetaminophen (TYLENOL) 500 MG tablet Take 1 tablet (500 mg total) by mouth every 6 (six) hours as needed for pain TAKE ONE OR TWO TABLETS BY MOUTH EVERY SIX HOURS AS NEEDED. 100 tablet 11     capsaicin (ZOSTRIX) 0.025 % cream Apply 1 application topically 2 (two) times a day as needed. 60 g 11     cholecalciferol, vitamin D3, (VITAMIN D3) 2,000 unit capsule Take 1 capsule (2,000 Units total) by mouth daily. 90 capsule 3     gabapentin (NEURONTIN) 300 MG capsule Take 1 capsule (300 mg total) by mouth 2 (two) times a day. 180 capsule 3     losartan (COZAAR) 50 MG tablet TAKE 1 TABLET(50 MG) BY MOUTH DAILY. 90 tablet 3     metFORMIN (GLUCOPHAGE) 500 MG tablet Take 1 tablet (500 mg total) by mouth 2 (two) times a day with meals. 180 tablet 3     metoprolol tartrate (LOPRESSOR) 50 MG tablet Take 1 tablet (50 mg total) by mouth daily. 90 tablet 3     naproxen (NAPROSYN) 500 MG tablet Take 1 tablet (500 mg total) by mouth every 12 (twelve) hours as needed (with food) TAKE 1 TABLET BY MOUTH TWICE DAILY WITH MEALS. 60 tablet 4     No current facility-administered medications for this visit.   "    Social History     Tobacco Use   Smoking Status Former Smoker     Last attempt to quit: 1/1/2015     Years since quitting: 3.9   Smokeless Tobacco Former User   Tobacco Comment     betel nut with tobacco       OBJECTICE: /86   Pulse 76   Temp 98.3  F (36.8  C) (Oral)   Resp 19   Ht 5' 5\" (1.651 m)   Wt 162 lb (73.5 kg)   SpO2 97%   BMI 26.96 kg/m       No results found for this or any previous visit (from the past 24 hour(s)).     GEN-alert, appropriate, in no apparent distress   CV-regular rate and rhythm with no murmur   RESP-lungs clear to auscultation   Musculoskeletal-no joint effusions, left leg amputation.   SKIN-well-healed postsurgical scars of the right chest, no ulcers or vesicles overlying his area of pain      Wagner Lamar"

## 2021-06-25 NOTE — PROGRESS NOTES
Clinic Care Coordination Contact  Patient has completed all goals with Clinic Care Coordination.  Please review the chart and confirm if maintenance  is approved.    -CHW called and spoke with patient who expressed doing well, has all medications and taking them daily.  -CHW offered to assist with dental appointment however patient refused.  -Patient had eye exam done and received eye glasses.  -Patient continues receiving PCA services with no change, patient is a [place] and receives [place] benefits.  -Patient did not address or established new goal and no immediate coordination assistance needed.  -Patient and PCA were informed to call with questions or concerns and if additional resources needed.

## 2021-06-27 ENCOUNTER — COMMUNICATION - HEALTHEAST (OUTPATIENT)
Dept: FAMILY MEDICINE | Facility: CLINIC | Age: 61
End: 2021-06-27

## 2021-06-27 DIAGNOSIS — M79.605 CHRONIC PAIN OF LEFT LOWER EXTREMITY: ICD-10-CM

## 2021-06-27 DIAGNOSIS — I10 ESSENTIAL HYPERTENSION, BENIGN: ICD-10-CM

## 2021-06-27 DIAGNOSIS — M25.50 PAIN IN JOINT, MULTIPLE SITES: ICD-10-CM

## 2021-06-27 DIAGNOSIS — E11.8 TYPE 2 DIABETES MELLITUS WITH COMPLICATION, WITHOUT LONG-TERM CURRENT USE OF INSULIN (H): ICD-10-CM

## 2021-06-27 DIAGNOSIS — G89.29 CHRONIC PAIN OF LEFT LOWER EXTREMITY: ICD-10-CM

## 2021-06-28 RX ORDER — GABAPENTIN 300 MG/1
CAPSULE ORAL
Qty: 180 CAPSULE | Refills: 3 | Status: SHIPPED | OUTPATIENT
Start: 2021-06-28 | End: 2022-10-11

## 2021-06-28 RX ORDER — LOSARTAN POTASSIUM 100 MG/1
TABLET ORAL
Qty: 90 TABLET | Refills: 3 | Status: SHIPPED | OUTPATIENT
Start: 2021-06-28 | End: 2021-09-08

## 2021-06-28 RX ORDER — AMLODIPINE BESYLATE 10 MG/1
TABLET ORAL
Qty: 90 TABLET | Refills: 3 | Status: SHIPPED | OUTPATIENT
Start: 2021-06-28 | End: 2021-09-08

## 2021-06-30 NOTE — PROGRESS NOTES
Progress Notes by Dianne Helms RN at 1/6/2021  9:00 AM     Author: Dianne Helms RN Service: -- Author Type: Registered Nurse    Filed: 1/11/2021  5:25 AM Encounter Date: 1/6/2021 Status: Signed    : Dianne Helms RN (Registered Nurse)       Clinic Care Coordination Contact    Clinic Care Coordination Contact  OUTREACH    Referral Information:  Referral Source: PCP    Primary Diagnosis: Cardiovascular - other    Chief Complaint   Patient presents with   ? Clinic Care Coordination - Initial     Clinic Utilization  Difficulty keeping appointments:: Yes  Compliance Concerns: No  No-Show Concerns: No  No PCP office visit in Past Year: No  Utilization    Last refreshed: 1/6/2021  1:51 AM: Hospital Admissions 0           Last refreshed: 1/6/2021  1:51 AM: ED Visits 0           Last refreshed: 1/6/2021  1:51 AM: No Show Count (past year) 1              Current as of: 1/6/2021  1:51 AM              Clinical Concerns:  - CCC RN assessment completed today via phone with patient and daughter - Deniz Hawkins.     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     - Patient was referral to CCC by PCP for:   Provider Comments 12/21/2020  4:34 PM Wagner Lamar MD Provider Comments -   Note    Patient has resistant hypertension and elevated metanephrines on blood testing and needs to have a CT of the adrenal glands that I had ordered previously but has never been completed.  Despite multiple attempts at follow-up, we have been unsuccessful at getting him the CT scan completed and then follow-up with me here in the clinic.  He needs follow-up care coordination.  Thank you.                  - lives in single family home with  spouse, 3 adult children, son in law and 3 grand kids.   - [place] citizen   - Social security income - $580/month.   - SNAP - $300ish      - denies abuse or neglect.   - denies financial issues or food insecurity.   - PCA service - 6.45 hours per day - Deniz Hawkins - daughter is his PCA.    FOLLOW UP NEEDED.     1) CT scan   - CT scan appt scheduled on 1/12/2021 @ 8:00am.   - Reminded daughter this appt and daughter states she will take patient to his CT scan appt. Provided daughter of clinic address.     2) Follow up appt with PCP post CT scan  - PCP would like to see patient post CT scan for follow up  - CHW to assist with follow up PCP appt post CT scan.       Pain  Pain (GOAL):: No  Health Maintenance Reviewed:    Clinical Pathway: None     Medication Management:  - CCC RN reviewed meds with patient's daughter.   - Daughter states patient has been taking his meds as prescribed.   - Daughter able to read some English and will assist patient with reading dosing directions.     1) Tylenol - PRN    2) Amlodipine 10mg (1) tab daily - AM    3) Atenolol 100mg (1) tab daily     4) Vit D 2000 international unit(s) (1) tab daily - not in home    5) Ezetimibe 10mg (1) tab daily    6) Gabapentin 300mg (1) tab two times a day     7) Losartan 100mg (1) tab daily - AM    8) Metformin (1) tab two times a day          Functional Status:  Dependent ADLs:: Ambulation-cane, Bathing, Dressing, Grooming  Dependent IADLs:: Cleaning, Cooking, Laundry, Shopping, Meal Preparation, Medication Management, Money Management, Transportation  Bed or wheelchair confined:: No  Mobility Status: Independent w/Device  Fallen 2 or more times in the past year?: No  Any fall with injury in the past year?: No    Living Situation:  Current living arrangement:: I live in a private home with family  Type of residence:: Private home - stairs    Lifestyle & Psychosocial Needs:        Diet:: Diabetic diet  Inadequate nutrition (GOAL):: No  Tube Feeding: No  Inadequate  activity/exercise (GOAL):: No  Significant changes in sleep pattern (GOAL): No  Transportation means:: Regular car, Family     Orthodoxy or spiritual beliefs that impact treatment:: No  Mental health DX:: No  Mental health management concern (GOAL):: No  Chemical Dependency Status: Not Applicable  Informal Support system:: Children, [place] based, Family, Spouse   Socioeconomic History   ? Marital status:      Spouse name: Not on file   ? Number of children: Not on file   ? Years of education: Not on file   ? Highest education level: Not on file     Tobacco Use   ? Smoking status: Former Smoker     Quit date: 2015     Years since quittin.0   ? Smokeless tobacco: Former User   ? Tobacco comment:  betel nut with tobacco   Substance and Sexual Activity   ? Alcohol use: No   ? Drug use: No   ? Sexual activity: Yes     Partners: Female       Resources and Interventions:  Current Resources:      Community Resources: PCA, County Worker, DME  Supplies Currently Used at Home: None  Equipment Currently Used at Home: cane, straight  Type of Employment: Retired, Unemployed    Referrals Placed: None     Goals:   Goals        Patient Stated    ? Other (pt-stated)      Goal Statement: I will attend my CT scan appt the next 30 days.    Date Goal set: 2021  Barriers: language barrier, no show concerns  Strengths: good family support, pca service  Date to Achieve By: 2021  Patient expressed understanding of goal: Yes    Action steps to achieve this goal:  1. I will attend my CT appt on 2021 at 8:00am. No ride needed.   2. I will call CHW with any concerns.         ? Other (pt-stated)      Goal Statement: I will attend my with PCP the next 30 days.     Date Goal set: 2021  Barriers: language barrier, no show concerns  Strengths: good family support, pca service  Date to Achieve By: 2021  Patient expressed understanding of goal: Yes    Action steps to achieve this goal:  1) I will attend my follow up  appt with PCP post CT scan as scheduled. TBD  2) I will call CHW with any concerns or questions.               Outreach Frequency: 6 weeks  Future Appointments              In 6 days JN CT [address] [place] [address] CT, JN          Plan:   1) Patient will attend his CT scan appt on 1/12/2021 - own transportation.   2) CHW to assist with follow up appt with PCP post CT scan. - check to make sure patient attends his CT scan appt before making follow up appt with PCP.   3) CCC RN will follow up on

## 2021-06-30 NOTE — PROGRESS NOTES
Progress Notes by Dianne Helms RN at 1/12/2021 11:26 AM     Author: Dianne Helms RN Service: -- Author Type: Registered Nurse    Filed: 1/12/2021 11:38 AM Encounter Date: 1/12/2021 Status: Signed    : Dianne Helms RN (Registered Nurse)       Clinic Care Coordination Contact    Follow Up Progress Note      Assessment: follow up post CT scan  -   Wagner Lamar MD Dah, Nadia RN             Please inform the patient of good news on his CT scan results-it is normal, there is no adrenal tumor.  He should continue his current medications and recheck with me in the clinic in 2 months.      - Writer notified patient and daughter of normal CT scan results today. They both were very happy.   - Assisted patient today scheduled 2 months follow up with PCP on 3/11/2021 at 11:40am.     Goals addressed this encounter:   Goals Addressed                 This Visit's Progress       Patient Stated    ? COMPLETED: Other (pt-stated)   100%     Goal Statement: I will attend my CT scan appt the next 30 days.    Date Goal set: 1/6/2021  Barriers: language barrier, no show concerns  Strengths: good family support, pca service  Date to Achieve By: 2/6/2021  Patient expressed understanding of goal: Yes    Action steps to achieve this goal:  1. I will attend my CT appt on 1/12/2021 at 8:00am. No ride needed.   2. I will call CHW with any concerns.         ? Other (pt-stated)   20%     Goal Statement: I will attend my with PCP the next 90 days.     Date Goal set: 1/6/2021  Barriers: language barrier, no show concerns  Strengths: good family support, pca service  Date to Achieve By: 2/6/2021  Patient expressed understanding of goal: Yes    Action steps to achieve this goal:  1) I will attend my 2 months follow up with PCP on 3/11/2021 @ 11:40am.   2) I will call CHW with any concerns or questions.                Outreach Frequency: 6 weeks    Plan:   1) Patient will attend his appt with PCP on 3/11/2021 at 11:40am  2) [facility] RN will follow up  in 6 weeks.

## 2021-07-07 NOTE — TELEPHONE ENCOUNTER
RN cannot approve Refill Request    RN can NOT refill this medication PCP messaged that patient is overdue for Labs. Last office visit: 5/18/2021 Wagner Lamar MD Last Physical: Visit date not found Last MTM visit: Visit date not found Last visit same specialty: 5/18/2021 Wagner Lamar MD.  Next visit within 3 mo: Visit date not found  Next physical within 3 mo: Visit date not found      Erendira Soriano, [facility] Triage/Med Refill 6/27/2021    Requested Prescriptions   Pending Prescriptions Disp Refills     metFORMIN (GLUCOPHAGE) 500 MG tablet [Pharmacy Med Name: METFORMIN 500MG TABLETS] 180 tablet 3     Sig: TAKE 1 TABLET(500 MG) BY MOUTH TWICE DAILY WITH MEALS       Metformin Refill Protocol Failed - 6/27/2021  3:19 AM        Failed - LFT or AST or ALT in last 12 months     Albumin   Date Value Ref Range Status   05/27/2020 4.2 3.5 - 5.0 g/dL Final     Bilirubin, Total   Date Value Ref Range Status   05/27/2020 0.7 0.0 - 1.0 mg/dL Final     Bilirubin, Direct   Date Value Ref Range Status   01/18/2016 0.2 <=0.5 mg/dL Final     Alkaline Phosphatase   Date Value Ref Range Status   05/27/2020 92 45 - 120 U/L Final     AST   Date Value Ref Range Status   05/27/2020 34 0 - 40 U/L Final     ALT   Date Value Ref Range Status   05/27/2020 34 0 - 45 U/L Final     Protein, Total   Date Value Ref Range Status   05/27/2020 8.1 (H) 6.0 - 8.0 g/dL Final                Failed - Microalbumin in last year      Microalbumin, Random Urine   Date Value Ref Range Status   07/09/2019 <0.50 0.00 - 1.99 mg/dL Final                  Passed - Blood pressure in last 12 months     BP Readings from Last 1 Encounters:   05/18/21 110/70             Passed - GFR or Serum Creatinine in last 6 months     GFR MDRD Non Af Amer   Date Value Ref Range Status   12/24/2020 >60 >60 mL/min/1.73m2 Final     GFR MDRD Af Amer   Date Value Ref Range Status   12/24/2020 >60 >60 mL/min/1.73m2 Final             Passed - Visit with PCP or prescribing  provider visit in last 6 months or next 3 months     Last office visit with prescriber/PCP: 5/18/2021 OR same dept: 5/18/2021 Wagner Lamar MD OR same specialty: 5/18/2021 Wagner Lamar MD Last physical: Visit date not found Last MTM visit: Visit date not found         Next appt within 3 mo: Visit date not found  Next physical within 3 mo: Visit date not found  Prescriber OR PCP: Wagner Lamar MD  Last diagnosis associated with med order: 1. Type 2 diabetes mellitus with complication, without long-term current use of insulin (H)  - metFORMIN (GLUCOPHAGE) 500 MG tablet [Pharmacy Med Name: METFORMIN 500MG TABLETS]; TAKE 1 TABLET(500 MG) BY MOUTH TWICE DAILY WITH MEALS  Dispense: 180 tablet; Refill: 3    2. Benign Essential Hypertension  - amLODIPine (NORVASC) 10 MG tablet [Pharmacy Med Name: AMLODIPINE BESYLATE 10MG TABLETS]; TAKE 1 TABLET(10 MG) BY MOUTH DAILY  Dispense: 90 tablet; Refill: 3  - losartan (COZAAR) 100 MG tablet [Pharmacy Med Name: LOSARTAN 100MG TABLETS]; TAKE 1 TABLET(100 MG) BY MOUTH DAILY  Dispense: 90 tablet; Refill: 3    3. Arthralgias In Multiple Sites  - gabapentin (NEURONTIN) 300 MG capsule [Pharmacy Med Name: GABAPENTIN 300MG CAPSULES]; TAKE 1 CAPSULE(300 MG) BY MOUTH TWICE DAILY  Dispense: 180 capsule; Refill: 3    4. Chronic pain of left lower extremity  - gabapentin (NEURONTIN) 300 MG capsule [Pharmacy Med Name: GABAPENTIN 300MG CAPSULES]; TAKE 1 CAPSULE(300 MG) BY MOUTH TWICE DAILY  Dispense: 180 capsule; Refill: 3     If protocol passes may refill for 12 months if within 3 months of last provider visit (or a total of 15 months).           Passed - A1C in last 6 months     Hemoglobin A1c   Date Value Ref Range Status   05/18/2021 7.0 (H) <=5.6 % Final                  amLODIPine (NORVASC) 10 MG tablet [Pharmacy Med Name: AMLODIPINE BESYLATE 10MG TABLETS] 90 tablet 3     Sig: TAKE 1 TABLET(10 MG) BY MOUTH DAILY       Calcium-Channel Blockers Protocol Passed - 6/27/2021  3:19 AM         Passed - PCP or prescribing provider visit in past 12 months or next 3 months     Last office visit with prescriber/PCP: 5/18/2021 Wagner Lamar MD OR same dept: 5/18/2021 Wagner Lamar MD OR same specialty: 5/18/2021 Wagner Lamar MD  Last physical: Visit date not found Last MTM visit: Visit date not found   Next visit within 3 mo: Visit date not found  Next physical within 3 mo: Visit date not found  Prescriber OR PCP: Wagner Lamar MD  Last diagnosis associated with med order: 1. Type 2 diabetes mellitus with complication, without long-term current use of insulin (H)  - metFORMIN (GLUCOPHAGE) 500 MG tablet [Pharmacy Med Name: METFORMIN 500MG TABLETS]; TAKE 1 TABLET(500 MG) BY MOUTH TWICE DAILY WITH MEALS  Dispense: 180 tablet; Refill: 3    2. Benign Essential Hypertension  - amLODIPine (NORVASC) 10 MG tablet [Pharmacy Med Name: AMLODIPINE BESYLATE 10MG TABLETS]; TAKE 1 TABLET(10 MG) BY MOUTH DAILY  Dispense: 90 tablet; Refill: 3  - losartan (COZAAR) 100 MG tablet [Pharmacy Med Name: LOSARTAN 100MG TABLETS]; TAKE 1 TABLET(100 MG) BY MOUTH DAILY  Dispense: 90 tablet; Refill: 3    3. Arthralgias In Multiple Sites  - gabapentin (NEURONTIN) 300 MG capsule [Pharmacy Med Name: GABAPENTIN 300MG CAPSULES]; TAKE 1 CAPSULE(300 MG) BY MOUTH TWICE DAILY  Dispense: 180 capsule; Refill: 3    4. Chronic pain of left lower extremity  - gabapentin (NEURONTIN) 300 MG capsule [Pharmacy Med Name: GABAPENTIN 300MG CAPSULES]; TAKE 1 CAPSULE(300 MG) BY MOUTH TWICE DAILY  Dispense: 180 capsule; Refill: 3    If protocol passes may refill for 12 months if within 3 months of last provider visit (or a total of 15 months).             Passed - Blood pressure filed in past 12 months     BP Readings from Last 1 Encounters:   05/18/21 110/70                gabapentin (NEURONTIN) 300 MG capsule [Pharmacy Med Name: GABAPENTIN 300MG CAPSULES] 180 capsule 3     Sig: TAKE 1 CAPSULE(300 MG) BY MOUTH TWICE DAILY        Gabapentin/Levetiracetam/Tiagabine Refill Protocol  Passed - 6/27/2021  3:19 AM        Passed - PCP or prescribing provider visit in past 12 months or next 3 months     Last office visit with prescriber/PCP: 5/18/2021 Wagner Lamar MD OR same dept: 5/18/2021 Wagner Lamar MD OR same specialty: 5/18/2021 Wagner Lamar MD  Last physical: Visit date not found Last MTM visit: Visit date not found   Next visit within 3 mo: Visit date not found  Next physical within 3 mo: Visit date not found  Prescriber OR PCP: Wagner Lamar MD  Last diagnosis associated with med order: 1. Type 2 diabetes mellitus with complication, without long-term current use of insulin (H)  - metFORMIN (GLUCOPHAGE) 500 MG tablet [Pharmacy Med Name: METFORMIN 500MG TABLETS]; TAKE 1 TABLET(500 MG) BY MOUTH TWICE DAILY WITH MEALS  Dispense: 180 tablet; Refill: 3    2. Benign Essential Hypertension  - amLODIPine (NORVASC) 10 MG tablet [Pharmacy Med Name: AMLODIPINE BESYLATE 10MG TABLETS]; TAKE 1 TABLET(10 MG) BY MOUTH DAILY  Dispense: 90 tablet; Refill: 3  - losartan (COZAAR) 100 MG tablet [Pharmacy Med Name: LOSARTAN 100MG TABLETS]; TAKE 1 TABLET(100 MG) BY MOUTH DAILY  Dispense: 90 tablet; Refill: 3    3. Arthralgias In Multiple Sites  - gabapentin (NEURONTIN) 300 MG capsule [Pharmacy Med Name: GABAPENTIN 300MG CAPSULES]; TAKE 1 CAPSULE(300 MG) BY MOUTH TWICE DAILY  Dispense: 180 capsule; Refill: 3    4. Chronic pain of left lower extremity  - gabapentin (NEURONTIN) 300 MG capsule [Pharmacy Med Name: GABAPENTIN 300MG CAPSULES]; TAKE 1 CAPSULE(300 MG) BY MOUTH TWICE DAILY  Dispense: 180 capsule; Refill: 3    If protocol passes may refill for 12 months if within 3 months of last provider visit (or a total of 15 months).                losartan (COZAAR) 100 MG tablet [Pharmacy Med Name: LOSARTAN 100MG TABLETS] 90 tablet 3     Sig: TAKE 1 TABLET(100 MG) BY MOUTH DAILY       Angiotensin Receptor Blocker Protocol Passed - 6/27/2021  3:19 AM         Passed - PCP or prescribing provider visit in past 12 months       Last office visit with prescriber/PCP: 5/18/2021 Wagner Lamar MD OR same dept: 5/18/2021 Wagner Lamar MD OR same specialty: 5/18/2021 Wagner Lamar MD  Last physical: Visit date not found Last MTM visit: Visit date not found   Next visit within 3 mo: Visit date not found  Next physical within 3 mo: Visit date not found  Prescriber OR PCP: Wagner Lamar MD  Last diagnosis associated with med order: 1. Type 2 diabetes mellitus with complication, without long-term current use of insulin (H)  - metFORMIN (GLUCOPHAGE) 500 MG tablet [Pharmacy Med Name: METFORMIN 500MG TABLETS]; TAKE 1 TABLET(500 MG) BY MOUTH TWICE DAILY WITH MEALS  Dispense: 180 tablet; Refill: 3    2. Benign Essential Hypertension  - amLODIPine (NORVASC) 10 MG tablet [Pharmacy Med Name: AMLODIPINE BESYLATE 10MG TABLETS]; TAKE 1 TABLET(10 MG) BY MOUTH DAILY  Dispense: 90 tablet; Refill: 3  - losartan (COZAAR) 100 MG tablet [Pharmacy Med Name: LOSARTAN 100MG TABLETS]; TAKE 1 TABLET(100 MG) BY MOUTH DAILY  Dispense: 90 tablet; Refill: 3    3. Arthralgias In Multiple Sites  - gabapentin (NEURONTIN) 300 MG capsule [Pharmacy Med Name: GABAPENTIN 300MG CAPSULES]; TAKE 1 CAPSULE(300 MG) BY MOUTH TWICE DAILY  Dispense: 180 capsule; Refill: 3    4. Chronic pain of left lower extremity  - gabapentin (NEURONTIN) 300 MG capsule [Pharmacy Med Name: GABAPENTIN 300MG CAPSULES]; TAKE 1 CAPSULE(300 MG) BY MOUTH TWICE DAILY  Dispense: 180 capsule; Refill: 3    If protocol passes may refill for 12 months if within 3 months of last provider visit (or a total of 15 months).             Passed - Serum potassium within the past 12 months     Lab Results   Component Value Date    Potassium 3.7 12/24/2020             Passed - Blood pressure filed in past 12 months     BP Readings from Last 1 Encounters:   05/18/21 110/70             Passed - Serum creatinine within the past 12 months     Creatinine   Date  Value Ref Range Status   12/24/2020 0.89 0.70 - 1.30 mg/dL Final

## 2021-08-02 ENCOUNTER — PATIENT OUTREACH (OUTPATIENT)
Dept: CARE COORDINATION | Facility: CLINIC | Age: 61
End: 2021-08-02

## 2021-08-02 NOTE — PROGRESS NOTES
Clinic Care Coordination Contact  Community Health Worker Follow Up    Goals:   Goals Addressed as of 8/2/2021 at 4:39 PM                    Today       Medical (pt-stated)   30%    Added 8/2/21 by Schuyler Kwok      Goal Statement: I will attend my annual physical exam the next 90 days.     Date Goal set: 5/28/2021  Barriers: language barrier, lack of knowledge  Strengths: agrees to work on goal  Date to Achieve By: 8/28/2021  Patient expressed understanding of goal: Yes    Action steps to achieve this goal:  1. I will attend my annual physical exam with PCP on 9/08/2021 at 10:55 AM.  2. I will call CHW or CCC RN with concerns or questions.        Goal update: 09/08/2021        Intervention and Education during outreach:  -CHW assisted patient with physical appointment on 9/08/2021 at 10:55 AM and PCA was informed.  -PCA will provide transportation for upcoming appointment.  -CHW offered to assist with dental appointment however patient refused.  -Patient had eye exam done and received eye glasses.  -Patient continues receiving PCA services with no change, patient is a [place] citizen and receives SSI benefits.  -Patient did not address or established new goal and no immediate coordination assistance needed.  -Patient and PCA were informed to call with questions or concerns and if additional resources needed.        CHW Next Outreach: In one month.

## 2021-08-03 PROBLEM — J86.9 EMPYEMA (H): Status: RESOLVED | Noted: 2018-06-22 | Resolved: 2018-07-02

## 2021-08-18 ENCOUNTER — PATIENT OUTREACH (OUTPATIENT)
Dept: CARE COORDINATION | Facility: CLINIC | Age: 61
End: 2021-08-18

## 2021-08-18 NOTE — LETTER
[facility]  Patient Centered Plan of Care  About Me:        Patient Name:  Schuyler Goodson    YOB: 1960  Age:         61 year old   [place] MRN:    [id number] Telephone Information:  Home Phone [phone]   Mobile [phone]       Address:  [address]  [place] [address] Email address:  No e-mail address on record      Emergency Contact(s)    Name Relationship Lgl Grd Work Phone Home Phone Mobile Phone   1KAL PRYOR Other   [phone]            Primary language:  Alissa     needed? Data Unavailable   [place] [employer]:  [phone] op. 1  Other communication barriers:    Preferred Method of Communication:     Current living arrangement:    Mobility Status/ Medical Equipment:      Health Maintenance  Health Maintenance Reviewed:      My Access Plan  Medical Emergency 911   Primary Clinic Line [facility] [phone]   24 Hour Appointment Line [phone] or  [phone]) (toll-free)   24 Hour Nurse Line [phone] (toll-free)   Preferred Urgent Care     Preferred Hospital     Preferred Pharmacy [id number] - [place], [place] - [address]     Behavioral Health Crisis Line The National Suicide Prevention Lifeline at 1-248.382.4178 or 911             My Care Team Members  Patient Care Team       Relationship Specialty Notifications Start End    Wagner Lamar MD PCP - General Family Practice  1/8/13     Phone: [phone] Fax: [phone]         [address] [place] [address]    Dianne Helms, RN Lead Care Coordinator Primary Care - CC Admissions 1/6/21     Schuyler Kwok Community Health Worker Primary Care - CC Admissions 1/11/21     Phone: [phone] Fax: [phone]         [address] [address]    Wagner Lamar MD Assigned PCP   6/16/21     Phone: [phone] Fax: [phone]         [address] [facility] [address] [place] [address]    Deniz Hawkins  Personal Care Attendant PCA   8/2/21     PCA: 6 hours and 45 minutes daily    Phone: [phone]         [facility] [place], [place]             My Care Plans  Self Management and Treatment Plan  Goals and (Comments)  Goals        General     Medical (pt-stated)      Notes - Note created  8/2/2021  4:38 PM by Schuyler Kwok     Goal Statement: I will attend my annual physical exam the next 90 days.     Date Goal set: 5/28/2021  Barriers: language barrier, lack of knowledge  Strengths: agrees to work on goal  Date to Achieve By: 8/28/2021  Patient expressed understanding of goal: Yes    Action steps to achieve this goal:  1. I will attend my annual physical exam with PCP on 9/08/2021 at 10:55 AM.  2. I will call CHW or CCC RN with concerns or questions.        Goal update: 09/08/2021             Action Plans on File:                       Advance Care Plans/Directives Type:        My Medical and Care Information  Problem List   Patient Active Problem List   Diagnosis     Hypercholesterolemia     Chest Pain     Traumatic Amputation Of One Leg At/Above The Knee     Post-traumatic Stress Disorder     Myalgia And Myositis     Vitamin D Deficiency     Benign Essential Hypertension     Arthralgias In Multiple Sites     Back Pain     Insomnia     Esophageal Reflux     Limb Pain     Elevated liver enzymes     Chronic lower limb pain     Chronic pain of left lower extremity     Intellectual disability     History of closed head injury     Pleural effusion     Sepsis (H)     Hyponatremia     Empyema, right (H)     TY (acute kidney injury) (H)     Microcytic anemia     Generalized muscle weakness     Anemia due to blood loss, acute     Right-sided chest pain     Type 2 diabetes mellitus with complication, without long-term current use of insulin (H)     Phantom limb pain (H)     Urinary incontinence     Resistant hypertension     Sinus tachycardia      Current Medications and Allergies:  See printed Medication Report.    Care  Coordination Start Date: 1/6/2021   Frequency of Care Coordination:     Form Last Updated: 08/18/2021

## 2021-08-18 NOTE — PROGRESS NOTES
Clinic Care Coordination Contact     Situation: Patient chart reviewed by care coordinator.     Background: Pts initial assessment and enrollment to Care Coordination was on 1/6/2021.   Patient centered goals were developed with participation from patient.  CC handed patient off to CHW for continued outreach every 30 days.      Assessment: CHW has been in contact with patient monthly. Patient has made progress to goals.        Care Gaps:  Scheduled 9/8/2021 scheduled with PCP for preventative.        1) Eye exam   - CHW to assist with yearly eye exam if patient agrees.     2) Preventative care  - scheduled with PCP on 9/8/2021 and to address several overdue care gaps.       Goal:   Goals        Medical (pt-stated)       Goal Statement: I will attend my annual physical exam the next 90 days.     Date Goal set: 5/28/2021  Barriers: language barrier, lack of knowledge  Strengths: agrees to work on goal  Date to Achieve By: 8/28/2021  Patient expressed understanding of goal: Yes    Action steps to achieve this goal:  1. I will attend my annual physical exam with PCP on 9/08/2021 at 10:55 AM.  2. I will call CHW or CCC RN with concerns or questions.        Goal update: 09/08/2021               Plan/Recommendations: CHW will involve CC as needed or if patient is ready to move to maintenance.  CC will continue to monitor progress to goals and CHW outreaches every 4 weeks.   CC RN will follow up in 6 weeks or sooner if needed.      Care Plan updated and mailed to patient: no

## 2021-08-23 ENCOUNTER — PATIENT OUTREACH (OUTPATIENT)
Dept: NURSING | Facility: CLINIC | Age: 61
End: 2021-08-23

## 2021-08-23 NOTE — PROGRESS NOTES
Clinic Care Coordination Contact    Community Health Worker Follow Up    Care Gaps:   Health Maintenance Due   Topic Date Due     PREVENTIVE CARE VISIT  Never done     LIPID  Never done     DIABETIC FOOT EXAM  Never done     ADVANCE CARE PLANNING  Never done     COVID-19 Vaccine (1) Never done     ZOSTER IMMUNIZATION (1 of 2) Never done     EYE EXAM  02/19/2020     MICROALBUMIN  07/09/2020     PHQ-2  01/01/2021     Scheduled 9/08/2021      Goals:   Goals Addressed as of 8/23/2021 at 11:59 AM                    8/18/21 8/2/21       Eye (pt-stated)         Added 8/23/21 by Schuyler Kwok      Goal Statement: I would like to get my annual eye exam done in the next 60 days.  Date Goal set: 8/23/2021.  Barriers: Language barrier and lack of knowledge on health.  Strengths: Agree to work on goal.  Date to Achieve By: 10/23/2021.  Patient expressed understanding of goal: Yes.    Action steps to achieve this goal:  1. I will answer my phone when [facility] calls for my annual eye exam appointment.   2. I will ask my daughter/PCA to help remembering my eye appointment once scheduled.  3. I will ask my daughter/PCA for medical ride.          Medical (pt-stated)   50%  30%    Added 8/2/21 by Schuyler Kwok      Goal Statement: I will attend my annual physical exam the next 90 days.     Date Goal set: 5/28/2021  Barriers: language barrier, lack of knowledge  Strengths: agrees to work on goal  Date to Achieve By: 8/28/2021  Patient expressed understanding of goal: Yes    Action steps to achieve this goal:  1. I will attend my annual physical exam with PCP on 9/08/2021 at 10:55 AM.  2. I will call CHW or CCC RN with concerns or questions.        Goal update: 08/23/2021        Intervention and Education during outreach:  -CHW called and spoke with [facility] and it has assigned an  to call patient to scheduled annual eye exam.  -Patient continues receiving PCA services and other qualified [place] benefits.  -Patient  expressed non new concern at this time and taking medications as directed.  -Patient and caregiver were informed to call with questions or concerns.       CHW Next Outreach: In one month.

## 2021-08-23 NOTE — TELEPHONE ENCOUNTER
Patient is assigned to one of [facility]'s interpreters to call to schedule annual appointment and patient last seen at [facility] on 2/19/2019.

## 2021-09-08 ENCOUNTER — OFFICE VISIT (OUTPATIENT)
Dept: FAMILY MEDICINE | Facility: CLINIC | Age: 61
End: 2021-09-08
Payer: COMMERCIAL

## 2021-09-08 VITALS
HEIGHT: 62 IN | HEART RATE: 124 BPM | WEIGHT: 170 LBS | DIASTOLIC BLOOD PRESSURE: 80 MMHG | OXYGEN SATURATION: 99 % | RESPIRATION RATE: 20 BRPM | TEMPERATURE: 99.5 F | SYSTOLIC BLOOD PRESSURE: 122 MMHG | BODY MASS INDEX: 31.28 KG/M2

## 2021-09-08 DIAGNOSIS — Z00.00 ROUTINE GENERAL MEDICAL EXAMINATION AT HEALTH CARE FACILITY: Primary | ICD-10-CM

## 2021-09-08 DIAGNOSIS — R00.0 SINUS TACHYCARDIA: ICD-10-CM

## 2021-09-08 DIAGNOSIS — I1A.0 RESISTANT HYPERTENSION: ICD-10-CM

## 2021-09-08 DIAGNOSIS — Z12.11 SCREENING FOR COLON CANCER: ICD-10-CM

## 2021-09-08 PROCEDURE — 99396 PREV VISIT EST AGE 40-64: CPT | Mod: 25 | Performed by: FAMILY MEDICINE

## 2021-09-08 PROCEDURE — 90471 IMMUNIZATION ADMIN: CPT | Performed by: FAMILY MEDICINE

## 2021-09-08 PROCEDURE — 90686 IIV4 VACC NO PRSV 0.5 ML IM: CPT | Performed by: FAMILY MEDICINE

## 2021-09-08 RX ORDER — PROPRANOLOL HYDROCHLORIDE 80 MG/1
80 CAPSULE, EXTENDED RELEASE ORAL DAILY
Qty: 90 CAPSULE | Refills: 3 | Status: SHIPPED | OUTPATIENT
Start: 2021-09-08 | End: 2021-12-08

## 2021-09-08 ASSESSMENT — MIFFLIN-ST. JEOR: SCORE: 1450.36

## 2021-09-08 NOTE — PROGRESS NOTES
ASSESMENT AND PLAN:    Physical, Health Maitenence -   Reviewed healthy lifestyle, diet, exercise, vitamins, and follow-up plan today with patient.  Reviewed age appropriate cancer and other screening recommendations.  Immunization review and update done.  Extensive counseling again done with the patient on the high benefits and low risks of COVID-19 vaccination, he was strongly encouraged to get the vaccination but continues to decline for unclear reasons.  Reviewed indicated lab tests, see lab orders.   -     HC FLU VAC PRESRV FREE QUAD SPLIT VIR > 6 MONTHS IM  Screening for colon cancer  -     COLOGUARD([employer])  Screening for prostate cancer   We will further discuss PSA with the patient at his next visit at which time blood will be drawn and we can add PSA if he would like to.  Resistant hypertension is improved with persistent sinus tachycardia  Discussed options with the patient and his family with help of a professional .  Discontinue atenolol and start propanolol, recheck in 3 months for recheck, sooner if needed.  Will be due for labs at that time.  -     propranolol ER (INDERAL LA) 80 MG 24 hr capsule; Take 1 capsule (80 mg) by mouth daily        HPI: 61-year-old male here for his physical and checkup.  No issues or concerns, he reports that overall he has been feeling very well.    ROS: No chest pain, no shortness of breath, no blood in the urine or blood in the stool, no skin lesions that have been changing, remainder of review of systems is as above or negative.    Past Medical History:   Diagnosis Date     History of transfusion        Current Outpatient Medications   Medication Sig Dispense Refill     amLODIPine (NORVASC) 10 MG tablet [AMLODIPINE (NORVASC) 10 MG TABLET] Take 1 tablet (10 mg total) by mouth daily. 90 tablet 3     gabapentin (NEURONTIN) 300 MG capsule [GABAPENTIN (NEURONTIN) 300 MG CAPSULE] TAKE 1 CAPSULE(300 MG) BY MOUTH TWICE DAILY 180 capsule 3     losartan  (COZAAR) 100 MG tablet [LOSARTAN (COZAAR) 100 MG TABLET] Take 1 tablet (100 mg total) by mouth daily. 90 tablet 3     metFORMIN (GLUCOPHAGE) 500 MG tablet [METFORMIN (GLUCOPHAGE) 500 MG TABLET] Take 1 tablet (500 mg total) by mouth 2 (two) times a day with meals. 180 tablet 3     propranolol ER (INDERAL LA) 80 MG 24 hr capsule Take 1 capsule (80 mg) by mouth daily 90 capsule 3     acetaminophen (TYLENOL) 500 MG tablet [ACETAMINOPHEN (TYLENOL) 500 MG TABLET] Take 1 tablet (500 mg total) by mouth every 6 (six) hours as needed for pain. TAKE ONE OR TWO TABLETS BY MOUTH EVERY SIX HOURS AS NEEDED 100 tablet 11     cholecalciferol, vitamin D3, (VITAMIN D3) 50 mcg (2,000 unit) capsule [CHOLECALCIFEROL, VITAMIN D3, (VITAMIN D3) 50 MCG (2,000 UNIT) CAPSULE] Take 1 capsule (2,000 Units total) by mouth daily. (Patient not taking: Reported on 9/8/2021) 90 capsule 3       Patient Active Problem List   Diagnosis     Hypercholesterolemia     Chest Pain     Traumatic Amputation Of One Leg At/Above The Knee     Post-traumatic Stress Disorder     Myalgia And Myositis     Vitamin D Deficiency     Benign Essential Hypertension     Arthralgias In Multiple Sites     Back Pain     Insomnia     Esophageal Reflux     Limb Pain     Elevated liver enzymes     Chronic lower limb pain     Chronic pain of left lower extremity     Intellectual disability     History of closed head injury     Pleural effusion     Sepsis (H)     Hyponatremia     Empyema, right (H)     TY (acute kidney injury) (H)     Microcytic anemia     Generalized muscle weakness     Anemia due to blood loss, acute     Right-sided chest pain     Type 2 diabetes mellitus with complication, without long-term current use of insulin (H)     Phantom limb pain (H)     Urinary incontinence     Resistant hypertension     Sinus tachycardia       Social History     Socioeconomic History     Marital status:      Spouse name: None     Number of children: None     Years of education:  "None     Highest education level: None   Occupational History     None   Tobacco Use     Smoking status: Former Smoker     Quit date: 2015     Years since quittin.6     Smokeless tobacco: Former User     Tobacco comment: betel nut with tobacco   Substance and Sexual Activity     Alcohol use: No     Drug use: No     Sexual activity: Yes     Partners: Female   Other Topics Concern     None   Social History Narrative     None     Social Determinants of Health     Financial Resource Strain:      Difficulty of Paying Living Expenses:    Food Insecurity:      Worried About Running Out of Food in the Last Year:      Ran Out of Food in the Last Year:    Transportation Needs:      Lack of Transportation (Medical):      Lack of Transportation (Non-Medical):    Physical Activity:      Days of Exercise per Week:      Minutes of Exercise per Session:    Stress:      Feeling of Stress :    Social Connections:      Frequency of Communication with Friends and Family:      Frequency of Social Gatherings with Friends and Family:      Attends [employer] Services:      Active Member of Clubs or Organizations:      Attends Club or Organization Meetings:      Marital Status:    Intimate Partner Violence:      Fear of Current or Ex-Partner:      Emotionally Abused:      Physically Abused:      Sexually Abused:        History   Smoking Status     Former Smoker     Quit date: 2015   Smokeless Tobacco     Former User     Comment: betel nut with tobacco       OBJECTICE: /80   Pulse (!) 124   Temp 99.5  F (37.5  C) (Oral)   Resp 20   Ht 1.575 m (5' 2\")   Wt 77.1 kg (170 lb)   SpO2 99%   BMI 31.09 kg/m        Gen - alert, orientated, NAD  Eyes - fundascopic exam limited by the undialated pupil but looks symmetric  ENT - oropharynx clear, TMs clear  Neck - supple, no palpable mass or lymphadenopathy  CV - RRR, no murmur  Resp - lungs CTA  Ab - soft, nontender, no palpable mass or organomegaly   - normal appearance to " the external genetalia, normal testicular exam bilaterally, no hernia  Extrem - left side LE amputation  Neuro - CN II-XII intact, strength, sensation, reflexes intact and symmetric  Skin - no rash, no atypical appearing lesions seen.             Today's PHQ-2 Score:   PHQ-2 (  [employer]) 2021   Q1: Little interest or pleasure in doing things 0   Q2: Feeling down, depressed or hopeless 0   PHQ-2 Score 0   Q1: Little interest or pleasure in doing things Not at all   Q2: Feeling down, depressed or hopeless Not at all   PHQ-2 Score 0       Abuse: Current or Past(Physical, Sexual or Emotional)- No  Do you feel safe in your environment? Yes    Have you ever done Advance Care Planning? (For example, a Health Directive, POLST, or a discussion with a medical provider or your loved ones about your wishes): No, advance care planning information given to patient to review.  Patient plans to discuss their wishes with loved ones or provider.      Social History     Tobacco Use     Smoking status: Former Smoker     Quit date: 2015     Years since quittin.6     Smokeless tobacco: Former User     Tobacco comment: betel nut with tobacco   Substance Use Topics     Alcohol use: No     If you drink alcohol do you typically have >3 drinks per day or >7 drinks per week? No    Alcohol Use 2021   Prescreen: >3 drinks/day or >7 drinks/week? No   No flowsheet data found.    Answers for HPI/ROS submitted by the patient on 2021  Frequency of exercise:: 6-7 days/week  Getting at least 3 servings of Calcium per day:: Yes  Diet:: Regular (no restrictions)  Taking medications regularly:: Yes  Medication side effects:: None  Bi-annual eye exam:: Yes  Dental care twice a year:: NO  Sleep apnea or symptoms of sleep apnea:: None  Additional concerns today:: No  Duration of exercise:: Less than 15 minutes

## 2021-09-23 ENCOUNTER — PATIENT OUTREACH (OUTPATIENT)
Dept: NURSING | Facility: CLINIC | Age: 61
End: 2021-09-23

## 2021-09-23 NOTE — PROGRESS NOTES
Clinic Care Coordination Contact    Community Health Worker Follow Up    Care Gaps:     Health Maintenance Due   Topic Date Due     LIPID  Never done     COVID-19 Vaccine (1) Never done     ZOSTER IMMUNIZATION (1 of 2) Never done     EYE EXAM  02/19/2020     MICROALBUMIN  07/09/2020       PCA/daughter will call [facility]  to scheduel eye appointment and patient is not ready for COVID-19 vaccine.    Goals:   Goals Addressed as of 9/23/2021 at 5:09 PM                    Today       Eye (pt-stated)   20%    Added 8/23/21 by Schuyler Kwok      Goal Statement: I would like to get my annual eye exam done in the next 60 days.  Date Goal set: 8/23/2021.  Barriers: Language barrier and lack of knowledge on health.  Strengths: Agree to work on goal.  Date to Achieve By: 10/23/2021.  Patient expressed understanding of goal: Yes.    Action steps to achieve this goal:  1. My PCA/daughter will contact the [facility]  and schedule the eye appointment.   2. I will ask my daughter/PCA to help remembering my eye appointment once scheduled.  3. I will ask my daughter/PCA for medical ride.       Goal update: 9/23/2021        Intervention and Education during outreach:  -Daughter/PCA will call [facility]  to schedule eye exam at [facility].  -Patient continues receiving PCA services with no change.  -Daughter/PCA and patient were informed to call with questions or concerns and if additional resource/coordination assistance needed.      CHW Next Outreach: In one month.

## 2021-09-29 ENCOUNTER — PATIENT OUTREACH (OUTPATIENT)
Dept: CARE COORDINATION | Facility: CLINIC | Age: 61
End: 2021-09-29

## 2021-09-29 NOTE — PROGRESS NOTES
Clinic Care Coordination Contact     Situation: Patient chart reviewed by care coordinator.     Background: Pts initial assessment and enrollment to Care Coordination was on 1/6/2021.   Patient centered goals were developed with participation from patient.  CC handed patient off to CHW for continued outreach every 30 days.      Assessment: CHW has been in contact with patient monthly. Patient has made progress to goals.       Goal:  Goals        Eye (pt-stated)       Goal Statement: I would like to get my annual eye exam done in the next 60 days.  Date Goal set: 8/23/2021.  Barriers: Language barrier and lack of knowledge on health.  Strengths: Agree to work on goal.  Date to Achieve By: 10/23/2021.  Patient expressed understanding of goal: Yes.    Action steps to achieve this goal:  1. My PCA/daughter will contact the [facility]  and schedule the eye appointment.   2. I will ask my daughter/PCA to help remembering my eye appointment once scheduled.  3. I will ask my daughter/PCA for medical ride.       Goal update: 9/23/2021         Medical 1 (pt-stated)         Goal Statement: I will attend my 3 month follow up with PCP in December, 2021.     Date Goal set: 9/29/2021  Barriers: language barrier  Strengths: motivated to attend PCP appt  Date to Achieve By: 12/31/2021  Patient expressed understanding of goal: Yes    Action steps to achieve this goal:  1. I will answer my phone when I am contacted to schedule my 3 month follow up with PCP.  2. I will attend my Annual Physical appointment at [facility] on 12/8/2021 at 12:00pm.   3. I will schedule a follow up appointment with my PCP if it is recommended to do so while I am at the clinic.  4. I will follow up with CCC regarding this goal at each outreach until it is completed.                 Plan/Recommendations: CHW will involve CC as needed or if patient is ready to move to maintenance.  CC will continue to monitor progress to goals and CHW outreaches  every 4 weeks.   CC RN will follow up in 6 weeks or sooner if needed.      Care Plan updated and mailed to patient: no

## 2021-10-26 ENCOUNTER — PATIENT OUTREACH (OUTPATIENT)
Dept: NURSING | Facility: CLINIC | Age: 61
End: 2021-10-26

## 2021-10-26 NOTE — PROGRESS NOTES
Clinic Care Coordination Contact    Community Health Worker Follow Up    Care Gaps:   Health Maintenance Due   Topic Date Due     LIPID  Never done     COVID-19 Vaccine (1) Never done     ZOSTER IMMUNIZATION (1 of 2) Never done     EYE EXAM  02/19/2020     MICROALBUMIN  07/09/2020      Patient had eye exam done and declined for COVID-19 vaccine.   Goals:   Goals Addressed as of 10/26/2021 at 2:40 PM                    Today    9/29/21       Medical 1 (pt-stated)   30%  20%    Added 9/29/21 by Dianne Helms RN      Goal Statement: I will attend my 3 month follow up with PCP in December, 2021.     Date Goal set: 9/29/2021  Barriers: language barrier  Strengths: motivated to attend PCP appt  Date to Achieve By: 12/31/2021  Patient expressed understanding of goal: Yes    Action steps to achieve this goal:  1. I will attend my Annual Physical appointment at [facility] on 12/8/2021 at 12:00pm.   2. I will schedule a follow up appointment with my PCP if it is recommended to do so while I am at the clinic.  3. I will follow up with CCC regarding this goal at each outreach until it is completed.       Goal update: 10/26/2021        Intervention and Education during outreach:  -Patient had eye exam done last week at [facility].  -Patient will follow up with PCP again as scheduled in December 2021.  -Patient is receiving SSI, PCA, [place] and other qualified [place] benefits.  -Patient is well connected and well supported from family members.  -CHW informed patient and caregiver to call with questions or concerns.       CHW Next Outreach: In one month.

## 2021-11-11 ENCOUNTER — PATIENT OUTREACH (OUTPATIENT)
Dept: CARE COORDINATION | Facility: CLINIC | Age: 61
End: 2021-11-11
Payer: COMMERCIAL

## 2021-11-11 NOTE — PROGRESS NOTES
Clinic Care Coordination Contact     Situation: Patient chart reviewed by care coordinator.     Background: Pts initial assessment and enrollment to Care Coordination was on 1/6/2021.   Patient centered goals were developed with participation from patient.  CC handed patient off to CHW for continued outreach every 30 days.      Assessment: CHW has been in contact with patient monthly. Patient has made progress to goals. Patient is scheduled for his preventative care with PCP on 12/8/2021.      Goal:  Goals        1.Medical (pt-stated)       Goal Statement: I will attend my 3 month follow up with PCP in December, 2021.     Date Goal set: 9/29/2021  Barriers: language barrier  Strengths: motivated to attend PCP appt  Date to Achieve By: 12/31/2021  Patient expressed understanding of goal: Yes    Action steps to achieve this goal:  1. I will attend my follow up appointment with PCP at [facility] on 12/8/2021 at 12:00pm.   2. I will schedule a follow up appointment with my PCP if it is recommended to do so while I am at the clinic.  3. I will follow up with CCC regarding this goal at each outreach until it is completed.       Goal update: 10/26/2021          Plan/Recommendations: CHW will involve CC as needed or if patient is ready to move to maintenance.  CC will continue to monitor progress to goals and CHW outreaches every 4 weeks.   CC RN will follow up in 6 weeks or sooner if needed.      Care Plan updated and mailed to patient: no

## 2021-11-29 ENCOUNTER — PATIENT OUTREACH (OUTPATIENT)
Dept: NURSING | Facility: CLINIC | Age: 61
End: 2021-11-29
Payer: COMMERCIAL

## 2021-11-29 NOTE — PROGRESS NOTES
Clinic Care Coordination Contact    Community Health Worker Follow Up    Care Gaps:     Health Maintenance Due   Topic Date Due     LIPID  Never done     COVID-19 Vaccine (1) Never done     ZOSTER IMMUNIZATION (1 of 2) Never done     LUNG CANCER SCREENING  06/26/2019     EYE EXAM  02/19/2020     MICROALBUMIN  07/09/2020     A1C  11/18/2021     BMP  12/24/2021       Care Gaps Last addressed on 11/11/2021 with CCC RN    Goals:   Goals Addressed as of 11/29/2021 at 12:23 PM                    Today    11/11/21       1.Medical (pt-stated)   60%  50%1     Added 9/29/21 by Dianne Helms, RN      Goal Statement: I will attend my 3 month follow up with PCP in December, 2021.     Date Goal set: 9/29/2021  Barriers: language barrier  Strengths: motivated to attend PCP appt  Date to Achieve By: 12/31/2021  Patient expressed understanding of goal: Yes    Action steps to achieve this goal:  1. I will attend my follow up appointment with PCP at [facility] on 12/8/2021 at 12:00pm.   2. I will schedule a follow up appointment with my PCP if it is recommended to do so while I am at the clinic.  3. I will follow up with CCC regarding this goal at each outreach until it is completed.       Goal update: 11/29/2021        Intervention and Education during outreach:  -CHW reminded patient and daughter/PCA of upcoming appointment on 12/08/2021 with PCP.  -Patient does not need coordination assistance or resource.  -Patient was informed to call with questions or concerns.       CHW Next Outreach: In one month.

## 2021-12-08 ENCOUNTER — OFFICE VISIT (OUTPATIENT)
Dept: FAMILY MEDICINE | Facility: CLINIC | Age: 61
End: 2021-12-08
Payer: COMMERCIAL

## 2021-12-08 VITALS
HEART RATE: 125 BPM | DIASTOLIC BLOOD PRESSURE: 64 MMHG | RESPIRATION RATE: 16 BRPM | SYSTOLIC BLOOD PRESSURE: 116 MMHG | OXYGEN SATURATION: 99 % | TEMPERATURE: 99.2 F

## 2021-12-08 DIAGNOSIS — I1A.0 RESISTANT HYPERTENSION: ICD-10-CM

## 2021-12-08 DIAGNOSIS — E11.8 TYPE 2 DIABETES MELLITUS WITH COMPLICATION, WITHOUT LONG-TERM CURRENT USE OF INSULIN (H): Primary | ICD-10-CM

## 2021-12-08 DIAGNOSIS — R00.0 SINUS TACHYCARDIA: ICD-10-CM

## 2021-12-08 DIAGNOSIS — I10 ESSENTIAL HYPERTENSION, BENIGN: ICD-10-CM

## 2021-12-08 LAB
ANION GAP SERPL CALCULATED.3IONS-SCNC: 13 MMOL/L (ref 5–18)
BUN SERPL-MCNC: 11 MG/DL (ref 8–22)
CALCIUM SERPL-MCNC: 9.5 MG/DL (ref 8.5–10.5)
CHLORIDE BLD-SCNC: 103 MMOL/L (ref 98–107)
CO2 SERPL-SCNC: 21 MMOL/L (ref 22–31)
CREAT SERPL-MCNC: 1.07 MG/DL (ref 0.7–1.3)
GFR SERPL CREATININE-BSD FRML MDRD: 74 ML/MIN/1.73M2
GLUCOSE BLD-MCNC: 299 MG/DL (ref 70–125)
HBA1C MFR BLD: 7 % (ref 0–5.6)
HOLD SPECIMEN: NORMAL
POTASSIUM BLD-SCNC: 4 MMOL/L (ref 3.5–5)
SODIUM SERPL-SCNC: 137 MMOL/L (ref 136–145)

## 2021-12-08 PROCEDURE — 83036 HEMOGLOBIN GLYCOSYLATED A1C: CPT | Performed by: FAMILY MEDICINE

## 2021-12-08 PROCEDURE — 80048 BASIC METABOLIC PNL TOTAL CA: CPT | Performed by: FAMILY MEDICINE

## 2021-12-08 PROCEDURE — 99214 OFFICE O/P EST MOD 30 MIN: CPT | Performed by: FAMILY MEDICINE

## 2021-12-08 PROCEDURE — 36415 COLL VENOUS BLD VENIPUNCTURE: CPT | Performed by: FAMILY MEDICINE

## 2021-12-08 RX ORDER — PROPRANOLOL HYDROCHLORIDE 120 MG/1
120 CAPSULE, EXTENDED RELEASE ORAL DAILY
Qty: 90 CAPSULE | Refills: 3 | Status: SHIPPED | OUTPATIENT
Start: 2021-12-08 | End: 2023-02-06

## 2021-12-08 NOTE — PROGRESS NOTES
ASSESMENT AND PLAN:  Diagnoses and all orders for this visit:  Type 2 diabetes mellitus with complication, without long-term current use of insulin (H)  A1c today found to be 7.0% which is same as previous 6 months ago. Currently controlled. We will continue current regimen of metformin.  Counseling done with the patient with the help of a professional  on his high risk nature for COVID-19 infection and I strongly recommended that he get COVID-19 vaccination.  Patient is reluctant.  He is going to consider this but refuses the vaccine today.    Resistant hypertension improved with Sinus tachycardia  Blood pressure is currently within goal however patient continues to have persistent tachycardia. Previous EKGs has shown sinus tachycardia. Metanephrines have previously been mildly elevated without reaching criteria for pheochromocytoma. He had negative CT of adrenals.  He denies side effects from transition to atenolol to propranolol ER. We will increase dose to 120 mg and monitor for side effects. We will also obtain BMP to monitor renal function.   -     propranolol ER (INDERAL LA) 120 MG 24 hr capsule; Take 1 capsule (120 mg) by mouth daily    Benign Essential Hypertension  -     Basic metabolic panel  (Ca, Cl, CO2, Creat, Gluc, K, Na, BUN); Future  Other orders  -     Hemoglobin A1c; Future  -     Extra Tube; Future    Reviewed the risks and benefits of the treatment plan with the patient and/or caregiver and we discussed indications for routine and emergent follow-up.        SUBJECTIVE: 61 year old male with history of hypertension and diabetes coming in today for follow-up. Overall, he states that he is feeling well and has no complaints at this time. He denies chest pain, palpitations and shortness of breath. He is due for a COVID-19 vaccination which he states he will think about receiving at a later time.     Past Medical History:   Diagnosis Date     History of transfusion      Patient Active  Problem List   Diagnosis     Hypercholesterolemia     Chest Pain     Traumatic Amputation Of One Leg At/Above The Knee     Post-traumatic Stress Disorder     Myalgia And Myositis     Vitamin D Deficiency     Benign Essential Hypertension     Arthralgias In Multiple Sites     Back Pain     Insomnia     Esophageal Reflux     Limb Pain     Elevated liver enzymes     Chronic lower limb pain     Chronic pain of left lower extremity     Intellectual disability     History of closed head injury     Pleural effusion     Sepsis (H)     Hyponatremia     Empyema, right (H)     TY (acute kidney injury) (H)     Microcytic anemia     Generalized muscle weakness     Anemia due to blood loss, acute     Right-sided chest pain     Type 2 diabetes mellitus with complication, without long-term current use of insulin (H)     Phantom limb pain (H)     Urinary incontinence     Resistant hypertension     Sinus tachycardia     Current Outpatient Medications   Medication Sig Dispense Refill     amLODIPine (NORVASC) 10 MG tablet [AMLODIPINE (NORVASC) 10 MG TABLET] Take 1 tablet (10 mg total) by mouth daily. 90 tablet 3     gabapentin (NEURONTIN) 300 MG capsule [GABAPENTIN (NEURONTIN) 300 MG CAPSULE] TAKE 1 CAPSULE(300 MG) BY MOUTH TWICE DAILY 180 capsule 3     losartan (COZAAR) 100 MG tablet [LOSARTAN (COZAAR) 100 MG TABLET] Take 1 tablet (100 mg total) by mouth daily. 90 tablet 3     metFORMIN (GLUCOPHAGE) 500 MG tablet [METFORMIN (GLUCOPHAGE) 500 MG TABLET] Take 1 tablet (500 mg total) by mouth 2 (two) times a day with meals. 180 tablet 3     propranolol ER (INDERAL LA) 120 MG 24 hr capsule Take 1 capsule (120 mg) by mouth daily 90 capsule 3     acetaminophen (TYLENOL) 500 MG tablet [ACETAMINOPHEN (TYLENOL) 500 MG TABLET] Take 1 tablet (500 mg total) by mouth every 6 (six) hours as needed for pain. TAKE ONE OR TWO TABLETS BY MOUTH EVERY SIX HOURS AS NEEDED 100 tablet 11     cholecalciferol, vitamin D3, (VITAMIN D3) 50 mcg (2,000 unit)  capsule [CHOLECALCIFEROL, VITAMIN D3, (VITAMIN D3) 50 MCG (2,000 UNIT) CAPSULE] Take 1 capsule (2,000 Units total) by mouth daily. (Patient not taking: Reported on 9/8/2021) 90 capsule 3     History   Smoking Status     Former Smoker     Quit date: 1/1/2015   Smokeless Tobacco     Former User     Comment: betel nut with tobacco       OBJECTICE: /64 (BP Location: Left arm, Patient Position: Sitting)   Pulse (!) 125   Temp 99.2  F (37.3  C) (Oral)   Resp 16   SpO2 99%      Recent Results (from the past 24 hour(s))   Hemoglobin A1c    Collection Time: 12/08/21 11:49 AM   Result Value Ref Range    Hemoglobin A1C 7.0 (H) 0.0 - 5.6 %        GEN- Well appearing male in no acute distress   HEENT-Head atraumatic    CV-Tachycardic with regular rhythm. No murmur, rubs or gallops appreciated   RESP-Lungs clear to ascultation bilaterally    ABDOMINAL-Soft, non-tender    EXTREMITIES- Prosthetic present on left lower extremity    SKIN- Dry, intact. No lesions appreciated

## 2021-12-22 ENCOUNTER — PATIENT OUTREACH (OUTPATIENT)
Dept: CARE COORDINATION | Facility: CLINIC | Age: 61
End: 2021-12-22
Payer: COMMERCIAL

## 2021-12-22 ENCOUNTER — PATIENT OUTREACH (OUTPATIENT)
Dept: NURSING | Facility: CLINIC | Age: 61
End: 2021-12-22
Payer: COMMERCIAL

## 2021-12-22 NOTE — LETTER
[facility]  Patient Centered Plan of Care  About Me:        Patient Name:  Schuyler Goodson    YOB: 1960  Age:         61 year old   Schuyler MRN:    [id number] Telephone Information:  Home Phone [phone]   Mobile [phone]       Address:  [address]  [place] [address] Email address:  No e-mail address on record      Emergency Contact(s)    Name Relationship Lgl Grd Work Phone Home Phone Mobile Phone   1KAL PRYOR Daughter   [phone]            Primary language:  Alissa     needed? Yes   [facility] [employer]:  [phone] op. 1  Other communication barriers:Language barrier; Physical impairment; Lack of coping    Preferred Method of Communication:     Current living arrangement: I live in a private home with family    Mobility Status/ Medical Equipment: Independent w/Device        Health Maintenance  Health Maintenance Reviewed: No data recorded    My Access Plan  Medical Emergency 911   Primary Clinic Line [facility] [phone]   24 Hour Appointment Line [phone] or  [phone]) (toll-free)   24 Hour Nurse Line [phone] (toll-free)   Preferred Urgent Care [facility] [phone]     Preferred Hospital [facility]  [phone]     Preferred Pharmacy [id number] - [place], [place] - [address] [place] [address] AT [facility]     Behavioral Health Crisis Line The National Suicide Prevention Lifeline at 1-573.889.4831 or 911             My Care Team Members  Patient Care Team       Relationship Specialty Notifications Start End    Wagner Lamar MD PCP - General Family Practice  1/8/13     Phone: [phone] Fax: [phone]         [facility] [address] [place] [address]    Dianne Helms, RN Lead Care Coordinator Primary Care - CC Admissions 1/6/21     Schuyler Kwok Community Health Worker Primary Care - CC Admissions 1/11/21     Phone: [phone] Fax:  [phone]         [address] [place] [address] 1 [facility] [address]    Wagner Lamar MD Assigned PCP   6/16/21     Phone: [phone] Fax: [phone]         [address] [place] [address] [address] [place] [address]    Deniz Hawkins Personal Care Attendant PCA   8/2/21     PCA: 6 hours and 45 minutes daily    Phone: [phone]         Home [facility] Care [place], [place]             My Care Plans  Self Management and Treatment Plan  Goals and (Comments)       Action Plans on File:                       Advance Care Plans/Directives Type:   No data recorded    My Medical and Care Information  Problem List   Patient Active Problem List   Diagnosis     Hypercholesterolemia     Chest Pain     Traumatic Amputation Of One Leg At/Above The Knee     Post-traumatic Stress Disorder     Myalgia And Myositis     Vitamin D Deficiency     Benign Essential Hypertension     Arthralgias In Multiple Sites     Back Pain     Insomnia     Esophageal Reflux     Limb Pain     Elevated liver enzymes     Chronic lower limb pain     Chronic pain of left lower extremity     Intellectual disability     History of closed head injury     Pleural effusion     Sepsis (H)     Hyponatremia     Empyema, right (H)     TY (acute kidney injury) (H)     Microcytic anemia     Generalized muscle weakness     Anemia due to blood loss, acute     Right-sided chest pain     Type 2 diabetes mellitus with complication, without long-term current use of insulin (H)     Phantom limb pain (H)     Urinary incontinence     Resistant hypertension     Sinus tachycardia      Current Medications and Allergies:  See printed Medication Report.    Care Coordination Start Date: 1/6/2021   Frequency of Care Coordination: monthly     Form Last Updated: 12/22/2021

## 2021-12-22 NOTE — PROGRESS NOTES
Clinic Care Coordination Contact     Situation: Patient chart reviewed by care coordinator.     Background: Pts initial assessment and enrollment to Care Coordination was on 1/6/2021.   Patient centered goals were developed with participation from patient.  CC handed patient off to CHW for continued outreach every 30 days.      Assessment: CHW has been in contact with patient monthly. Patient has made progress to goals.  Patient attended his follow up appt with PCP on 12/8 and no new concerns.       Goals addressed this encounter:   Goals Addressed                    This Visit's Progress       COMPLETED: 1.Medical (pt-stated)   100%      Goal Statement: I will attend my 3 month follow up with PCP in December, 2021.     Date Goal set: 9/29/2021  Barriers: language barrier  Strengths: motivated to attend PCP appt  Date to Achieve By: 12/31/2021  Patient expressed understanding of goal: Yes    Action steps to achieve this goal:  1. I will attend my follow up appointment with PCP at [facility] on 12/8/2021 at 12:00pm.   2. I will schedule a follow up appointment with my PCP if it is recommended to do so while I am at the clinic.  3. I will follow up with CCC regarding this goal at each outreach until it is completed.       Goal update: 11/29/2021               Plan/Recommendations: okay to move to maintenance. .   CC RN will follow up in 8 weeks or sooner if needed.      Care Plan updated and mailed to patient: yes

## 2021-12-22 NOTE — PROGRESS NOTES
Clinic Care Coordination Contact  Patient has completed all goals with Clinic Care Coordination.  Please review the chart and confirm if graduation is approved.    -CHW is moving patient to maintenance and will outreach again in 2 months as approved from [facility] RN.  -CHW informed patient to call sooner for additional coordination assistance when needed.   -Patient will follow up with PCP in 4 months as scheduled.

## 2022-01-18 VITALS
SYSTOLIC BLOOD PRESSURE: 110 MMHG | HEART RATE: 129 BPM | TEMPERATURE: 98.9 F | OXYGEN SATURATION: 100 % | DIASTOLIC BLOOD PRESSURE: 70 MMHG | RESPIRATION RATE: 16 BRPM

## 2022-01-18 VITALS
WEIGHT: 170 LBS | BODY MASS INDEX: 28.32 KG/M2 | OXYGEN SATURATION: 95 % | HEART RATE: 137 BPM | TEMPERATURE: 98.4 F | SYSTOLIC BLOOD PRESSURE: 120 MMHG | RESPIRATION RATE: 24 BRPM | HEIGHT: 65 IN | DIASTOLIC BLOOD PRESSURE: 76 MMHG

## 2022-01-18 VITALS — SYSTOLIC BLOOD PRESSURE: 106 MMHG | DIASTOLIC BLOOD PRESSURE: 70 MMHG | HEART RATE: 133 BPM | OXYGEN SATURATION: 98 %

## 2022-01-18 VITALS
BODY MASS INDEX: 28.16 KG/M2 | HEART RATE: 122 BPM | SYSTOLIC BLOOD PRESSURE: 130 MMHG | HEIGHT: 65 IN | WEIGHT: 169 LBS | TEMPERATURE: 97.8 F | OXYGEN SATURATION: 96 % | RESPIRATION RATE: 24 BRPM | DIASTOLIC BLOOD PRESSURE: 80 MMHG

## 2022-01-31 ENCOUNTER — OFFICE VISIT (OUTPATIENT)
Dept: FAMILY MEDICINE | Facility: CLINIC | Age: 62
End: 2022-01-31
Payer: COMMERCIAL

## 2022-01-31 VITALS
TEMPERATURE: 98.5 F | RESPIRATION RATE: 16 BRPM | HEART RATE: 112 BPM | OXYGEN SATURATION: 97 % | SYSTOLIC BLOOD PRESSURE: 126 MMHG | DIASTOLIC BLOOD PRESSURE: 60 MMHG

## 2022-01-31 DIAGNOSIS — G89.29 CHRONIC PAIN OF BOTH LOWER EXTREMITIES: Primary | ICD-10-CM

## 2022-01-31 DIAGNOSIS — S78.112S: ICD-10-CM

## 2022-01-31 DIAGNOSIS — Z28.39 IMMUNIZATION DEFICIENCY: ICD-10-CM

## 2022-01-31 DIAGNOSIS — M79.604 CHRONIC PAIN OF BOTH LOWER EXTREMITIES: Primary | ICD-10-CM

## 2022-01-31 DIAGNOSIS — I1A.0 RESISTANT HYPERTENSION: ICD-10-CM

## 2022-01-31 DIAGNOSIS — M79.605 CHRONIC PAIN OF BOTH LOWER EXTREMITIES: Primary | ICD-10-CM

## 2022-01-31 DIAGNOSIS — E11.8 TYPE 2 DIABETES MELLITUS WITH COMPLICATION, WITHOUT LONG-TERM CURRENT USE OF INSULIN (H): ICD-10-CM

## 2022-01-31 PROCEDURE — 99213 OFFICE O/P EST LOW 20 MIN: CPT | Performed by: FAMILY MEDICINE

## 2022-01-31 NOTE — PROGRESS NOTES
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  Patient tolerating the new propanolol well and it has resulted in a reduction in his tachycardia and he has well-controlled blood pressure, medications will be continued as currently prescribed and he is coming back in to see me again in April for follow-up on diabetes and hypertension.      Reviewed the risks and benefits of the treatment plan with the patient and/or caregiver and we discussed indications for routine and emergent follow-up.        SUBJECTIVE: Had lab work done back in December showing good control of diabetes, normal creatinine.  Change in his blood pressure medication to add propanolol as detailed in previous note because of his ongoing tachycardia.  Blood pressure on today is under good control and his tachycardia has improved.  Patient reports no issues or side effects or problems with the new medication.  His main concern, is worsening pain in both of his legs, both his right leg and the left leg which is a post traumatic above-the-knee amputation.  He has been trying to do more to take care of himself since his PCA hours were cut dramatically, previously he had been getting over 5 hours per day and now he is down to less than 2 hours/day.  This is put a lot of extra demand on him and his family and he struggling to take care of his ADLs.  He has had some increased pain in both legs and would like a reassessment done at his he is having difficulty caring for  himself with the dramatic reduction in PCA hours.    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)     Post-traumatic Stress Disorder     Myalgia And Myositis     Vitamin D Deficiency     Benign Essential Hypertension     Arthralgias In Multiple Sites     Back Pain     Insomnia     Esophageal Reflux     Limb Pain     Elevated liver enzymes     Chronic lower limb pain     Chronic pain of left lower extremity     Intellectual disability     History of closed head injury     Pleural effusion     Sepsis (H)     Hyponatremia     Empyema, right (H)     TY (acute kidney injury) (H)     Microcytic anemia     Generalized muscle weakness     Anemia due to blood loss, acute     Right-sided chest pain     Type 2 diabetes mellitus with complication, without long-term current use of insulin (H)     Phantom limb pain (H)     Urinary incontinence     Resistant hypertension     Sinus tachycardia     Current Outpatient Medications   Medication Sig Dispense Refill     acetaminophen (TYLENOL) 500 MG tablet [ACETAMINOPHEN (TYLENOL) 500 MG TABLET] Take 1 tablet (500 mg total) by mouth every 6 (six) hours as needed for pain. TAKE ONE OR TWO TABLETS BY MOUTH EVERY SIX HOURS AS NEEDED 100 tablet 11     amLODIPine (NORVASC) 10 MG tablet [AMLODIPINE (NORVASC) 10 MG TABLET] Take 1 tablet (10 mg total) by mouth daily. 90 tablet 3     gabapentin (NEURONTIN) 300 MG capsule [GABAPENTIN (NEURONTIN) 300 MG CAPSULE] TAKE 1 CAPSULE(300 MG) BY MOUTH TWICE DAILY 180 capsule 3     losartan (COZAAR) 100 MG tablet [LOSARTAN (COZAAR) 100 MG TABLET] Take 1 tablet (100 mg total) by mouth daily. 90 tablet 3     metFORMIN (GLUCOPHAGE) 500 MG tablet [METFORMIN (GLUCOPHAGE) 500 MG TABLET] Take 1 tablet (500 mg total) by mouth 2 (two) times a day with meals. 180 tablet 3     propranolol ER (INDERAL LA) 120 MG  24 hr capsule Take 1 capsule (120 mg) by mouth daily 90 capsule 3     cholecalciferol, vitamin D3, (VITAMIN D3) 50 mcg (2,000 unit) capsule [CHOLECALCIFEROL, VITAMIN D3, (VITAMIN D3) 50 MCG (2,000 UNIT) CAPSULE] Take 1 capsule (2,000 Units total) by mouth daily. (Patient not taking: Reported on 1/31/2022) 90 capsule 3     History   Smoking Status     Former Smoker     Quit date: 1/1/2015   Smokeless Tobacco     Former User     Comment: betel nut with tobacco       OBJECTICE: /60 (BP Location: Right arm, Patient Position: Sitting)   Pulse 112   Temp 98.5  F (36.9  C) (Oral)   Resp 16   SpO2 97%      No results found for this or any previous visit (from the past 24 hour(s)).     CV-mild tachycardia, regular, no murmur   RESP-lungs clear to auscultation   EXTREM-left leg above-the-knee amputation   SKIN-stump site is without any skin changes that are concerning, no signs of infection or ulceration.      Wagner Lamar MD

## 2022-02-22 ENCOUNTER — PATIENT OUTREACH (OUTPATIENT)
Dept: NURSING | Facility: CLINIC | Age: 62
End: 2022-02-22
Payer: COMMERCIAL

## 2022-02-22 NOTE — PROGRESS NOTES
ASSESMENT AND PLAN:  Diagnoses and all orders for this visit:    Empyema, right (H)  Reviewed most recent infectious disease consultation, no further antibiotics are recommended.  We also reviewed the results of that chest x-ray was done on that appointment and it showed excellent improvement in his empyema.  No further infectious disease follow-up was recommended.    Arthralgias In Multiple Sites, Chronic pain of left lower extremity  Extensive medication review and counseling done today with the patient and his family with the help of a professional .  We are going to discontinue tramadol.  He has a step based approach to pain control starting with acetaminophen and gabapentin with naproxen as needed.    Type 2 diabetes mellitus with complication, without long-term current use of insulin  -     metFORMIN (GLUCOPHAGE) 500 MG tablet; Take 1 tablet (500 mg total) by mouth 2 (two) times a day with meals.  Dispense: 60 tablet; Refill: 11  Reviewed his most recent A1c of 7.9 from June 24, recheck in 2 months at which time he will be due for his next A1c.    Anemia due to blood loss, acute  Reviewed hemoglobin trend with the patient, CBC was done at his infectious disease follow-up a couple of weeks ago and it showed excellent improvement in his hemoglobin, had been 7, has improved to 12.1.    Benign Essential Hypertension-borderline control.  Discussed options with the patient, we discussed potentially increasing his losartan dose.  Given that his blood pressure did improve some on recheck, patient would like to proceed with continuing his current treatment plan and will recheck again in 2 months at his follow-up visit here in the clinic.    Screening for colon cancer  -     Cologuard          SUBJECTIVE: 58-year-old male comes in with several concerns.  He had a recent surgical procedure to address a loculated empyema.  His chest pain has continued to improve and has now almost resolved completely.  He no  Amado Gross is a 75 y.o. female (: 1949) presenting to address:    Chief Complaint   Patient presents with    Urinary Burning     ongoing for 7 days patient has body aches and had a fever this morning which was 101.0 but took tylenol        Vitals:    22 1407   BP: 94/65   Pulse: (!) 110   Resp: 16   Temp: 98 °F (36.7 °C)   TempSrc: Temporal   SpO2: 98%   Weight: 165 lb (74.8 kg)   Height: 5' 1.5\" (1.562 m)   PainSc:   7   PainLoc: Generalized       Hearing/Vision:   No exam data present    Learning Assessment:     Learning Assessment 2019   PRIMARY LEARNER Child(verónica)   PRIMARY LANGUAGE OTHER (COMMENT)   LEARNER PREFERENCE PRIMARY PICTURES     LISTENING     VIDEOS     DEMONSTRATION   ANSWERED BY daughter   RELATIONSHIP OTHER     Depression Screening:     3 most recent PHQ Screens 2022   Little interest or pleasure in doing things Not at all   Feeling down, depressed, irritable, or hopeless Not at all   Total Score PHQ 2 0     Fall Risk Assessment:     Fall Risk Assessment, last 12 mths 2022   Able to walk? Yes   Fall in past 12 months? 0   Do you feel unsteady? 0   Are you worried about falling 0     Abuse Screening:     Abuse Screening Questionnaire 2019   Do you ever feel afraid of your partner? N   Are you in a relationship with someone who physically or mentally threatens you? N   Is it safe for you to go home?  Y     ADL Assessment:     ADL Assessment 2019   Feeding yourself No Help Needed   Getting from bed to chair No Help Needed   Getting dressed No Help Needed   Bathing or showering No Help Needed   Walk across the room (includes cane/walker) No Help Needed   Using the telphone No Help Needed   Taking your medications No Help Needed   Preparing meals No Help Needed   Managing money (expenses/bills) No Help Needed   Moderately strenuous housework (laundry) No Help Needed   Shopping for personal items (toiletries/medicines) No Help Needed   Shopping for groceries No Help Needed   Driving No Help Needed   Climbing a flight of stairs No Help Needed   Getting to places beyond walking distances No Help Needed        Coordination of Care Questionaire:   1. \"Have you been to the ER, urgent care clinic since your last visit? Hospitalized since your last visit? \" No    2. \"Have you seen or consulted any other health care providers outside of the [address] [place] [address] [place] since your last visit? \" No     3. For patients aged 39-70: Has the patient had a colonoscopy? Yes - no Care Gap present     If the patient is female:    4. For patients aged 41-77: Has the patient had a mammogram within the past 2 years? Yes - no Care Gap present    5. For patients aged 21-65: Has the patient had a pap smear? Yes - no Care Gap present    Advanced Directive:   1. Do you have an Advanced Directive? NO    2. Would you like information on Advanced Directives?  NO longer requires tramadol for that pain.  He does have a history of chronic joint and limb pain related to previous trauma and injuries.  For that pain, he feels like he has adequate control with a combination of gabapentin and acetaminophen with occasional naproxen.  Patient reports that his pain is usually mild but becomes more moderate at times sometimes related to activity.  The pattern moderate pain can be a burning sensation in his left lower leg that moves around and is improved with his medication.  Patient also had a recent blood loss anemia with a hemoglobin of around 7.  He had been due for follow-up today and his hemoglobin but a CBC had been done with his recent follow-up with the infectious disease clinic which came back at 12.1 for his hemoglobin.  Colonoscopy and colon cancer screening  were discussed with the patient and the patient pursues to proceed with noninvasive option.    Review of systems: No fevers, no vomiting, no shortness of breath, remainder of review of systems is as above are negative.    Past Medical History:   Diagnosis Date     History of transfusion      Patient Active Problem List   Diagnosis     Hypercholesterolemia     Chest Pain     Traumatic Amputation Of One Leg At/Above The Knee     Post-traumatic Stress Disorder     Myalgia And Myositis     Vitamin D Deficiency     Benign Essential Hypertension     Arthralgias In Multiple Sites     Back Pain     Insomnia     Esophageal Reflux     Limb Pain     Elevated liver enzymes     Chronic lower limb pain     Chronic pain of left lower extremity     Intellectual disability     History of closed head injury     Pleural effusion     Sepsis (H)     Hyponatremia     Empyema, right (H)     TY (acute kidney injury) (H)     Microcytic anemia     Generalized muscle weakness     Anemia due to blood loss, acute     Right-sided chest pain     Type 2 diabetes mellitus with complication, without long-term current use of insulin (H)     Current  "Outpatient Prescriptions   Medication Sig Dispense Refill     acetaminophen (MAPAP, ACETAMINOPHEN,) 325 MG tablet TAKE ONE OR TWO TABLETS BY MOUTH EVERY SIX HOURS AS NEEDED 120 tablet 11     capsaicin (ZOSTRIX) 0.025 % cream Apply 1 application topically 2 (two) times a day.       cholecalciferol, vitamin D3, (VITAMIN D3) 2,000 unit capsule Take 1 capsule (2,000 Units total) by mouth daily. 30 capsule 11     gabapentin (NEURONTIN) 300 MG capsule Take 1 capsule (300 mg total) by mouth 2 (two) times a day. 60 capsule 11     losartan (COZAAR) 50 MG tablet TAKE 1 TABLET (50 MG TOTAL) BY MOUTH DAILY. 30 tablet 11     metFORMIN (GLUCOPHAGE) 500 MG tablet Take 1 tablet (500 mg total) by mouth 2 (two) times a day with meals. 60 tablet 11     metoprolol tartrate (LOPRESSOR) 50 MG tablet Take 50 mg by mouth daily.       naproxen (NAPROSYN) 500 MG tablet Take 1 tablet (500 mg total) by mouth every 12 (twelve) hours as needed (with food). TAKE 1 TABLET BY MOUTH TWICE DAILY WITH MEALS (Patient taking differently: Take 500 mg by mouth every 12 (twelve) hours as needed (with food). ) 60 tablet 11     No current facility-administered medications for this visit.      History   Smoking Status     Former Smoker   Smokeless Tobacco     Former User     Comment:  betel nut with tobacco       OBJECTICE: /80  Pulse 71  Temp 98.9  F (37.2  C) (Oral)   Resp 24  Ht 5' 5\" (1.651 m)  Wt 153 lb (69.4 kg)  SpO2 98%  BMI 25.46 kg/m2     No results found for this or any previous visit (from the past 24 hour(s)).     GEN-alert, appropriate, in no apparent distress   HEENT-his membranes are moist, neck is supple with no palpable mass or lymphadenopathy   CV-regular rate and rhythm with no murmur   RESP-lungs clear to auscultation   ABDOMINAL-soft and nontender   Psychiatric-appearance is well-groomed, affect is bright, mood is not depressed.   SKIN-no ulcers or vesicles   Neurologic-cranial nerves II through XII are intact, strength is " symmetric.      Wagner PEARL Villas

## 2022-02-23 ENCOUNTER — PATIENT OUTREACH (OUTPATIENT)
Dept: CARE COORDINATION | Facility: CLINIC | Age: 62
End: 2022-02-23
Payer: COMMERCIAL

## 2022-02-23 NOTE — LETTER
[facility] COORDINATION    February 23, 2022    Schuyler Bajwaint  [address]  [place] [address]    Dear Schuyler,  Your Care Team congratulates you on your journey to maintain wellness. This document will help guide you on your journey to maintain a healthy lifestyle.  You can use this to help you overcome any barriers you may encounter.  If you should have any questions or concerns, you can contact the members of your Care Team or contact your Primary Care Clinic for assistance.     Health Maintenance  Health Maintenance Reviewed:      My Access Plan  Medical Emergency 911   Primary Clinic Line [facility] - [phone]   24 Hour Appointment Line [phone] or  [phone]) (toll-free)   24 Hour Nurse Line [phone] (toll-free)   Preferred Urgent Care     Preferred Hospital     Preferred Pharmacy [id number] - [place], [place] - [address] [facility] [address] Behavioral Health Crisis Line The National Suicide Prevention Lifeline at 1-513.407.2002 or 911     My Care Team Members  Patient Care Team       Relationship Specialty Notifications Start End    Wagner Lamar MD PCP - General Family Practice  1/8/13     Phone: [phone] Fax: [phone]         [address] [facility] [address] [place] [address]    Dianne Helms, RN Lead Care Coordinator Primary Care - CC Admissions 1/6/21     Schuyler Kwok Community Health Worker Primary Care - CC Admissions 1/11/21     Phone: [phone] Fax: [phone]         [address] [address]    Wagner Lamar MD Assigned PCP   6/16/21     Phone: [phone] Fax: [phone]         [address] [facility] [address] [place] [address]    Deniz Hawkins Personal Care Attendant PCA   8/2/21     PCA: 1 hours and 45 minutes daily    Phone: [phone]         [facility] [place], [place]               Goals        COMPLETED: 1.Medical (pt-stated)       Goal Statement: I will attend my 3 month follow up  with PCP in December, 2021.     Date Goal set: 9/29/2021  Barriers: language barrier  Strengths: motivated to attend PCP appt  Date to Achieve By: 12/31/2021  Patient expressed understanding of goal: Yes    Action steps to achieve this goal:  1. I will attend my follow up appointment with PCP at [facility] on 12/8/2021 at 12:00pm.   2. I will schedule a follow up appointment with my PCP if it is recommended to do so while I am at the clinic.  3. I will follow up with CCC regarding this goal at each outreach until it is completed.       Goal update: 11/29/2021         COMPLETED: Eye (pt-stated)       Goal Statement: I would like to get my annual eye exam done in the next 60 days.  Date Goal set: 8/23/2021.  Barriers: Language barrier and lack of knowledge on health.  Strengths: Agree to work on goal.  Date to Achieve By: 10/23/2021.  Patient expressed understanding of goal: Yes.    Action steps to achieve this goal:  1. I had my eye exam done at [facility] last week.  2. I will ask my daughter/PCA to help me with yearly eye exam.  3. I will ask my daughter/PCA for medical ride.       Goal completed: 10/26/2021         COMPLETED: Medical (pt-stated)       Goal Statement: I will attend my annual physical exam the next 90 days.     Date Goal set: 5/28/2021  Barriers: language barrier, lack of knowledge  Strengths: agrees to work on goal  Date to Achieve By: 8/28/2021  Patient expressed understanding of goal: Yes    Action steps to achieve this goal:  1. I attended my physical appointment as scheduled with PCP.  2. I will follow up as recommended.       Goal completed: 9/23/2021             Advance Care Plans/Directives Type:          It has been your Clinic Care Team's pleasure to work with you on your goals.    Regards,  Your Clinic Care Team

## 2022-02-23 NOTE — PROGRESS NOTES
Care Coordination Clinician Chart Review     Situation: Patient chart reviewed by care coordinator.?     Background: Initial assessment and enrollment to Care Coordination was on 1/6/2021.?? Patient centered goals were developed with participation from patient.? Lead CC handed patient off to CHW for continued outreach every 30 days.??     Assessment: Per chart review, patient outreach completed by CC CHW on 2/22/2022.? Patient was moved to maintenance on 12/22/2021 after completed all goals. No ED visits or hospitalization the past 2 months. Patient has follow up appt scheduled with PCP on 4/11/2022. Per CHW outreach yesterday, patient denied new concerns.     Plan/Recommendations: Graduated from [facility] as of today.

## 2022-04-01 ENCOUNTER — TRANSFERRED RECORDS (OUTPATIENT)
Dept: MULTI SPECIALTY CLINIC | Facility: CLINIC | Age: 62
End: 2022-04-01

## 2022-04-01 LAB — RETINOPATHY: NORMAL

## 2022-04-11 ENCOUNTER — OFFICE VISIT (OUTPATIENT)
Dept: FAMILY MEDICINE | Facility: CLINIC | Age: 62
End: 2022-04-11
Payer: COMMERCIAL

## 2022-04-11 VITALS
OXYGEN SATURATION: 95 % | HEART RATE: 114 BPM | DIASTOLIC BLOOD PRESSURE: 76 MMHG | TEMPERATURE: 99 F | RESPIRATION RATE: 16 BRPM | SYSTOLIC BLOOD PRESSURE: 124 MMHG

## 2022-04-11 DIAGNOSIS — E11.8 TYPE 2 DIABETES MELLITUS WITH COMPLICATION, WITHOUT LONG-TERM CURRENT USE OF INSULIN (H): ICD-10-CM

## 2022-04-11 DIAGNOSIS — E55.9 VITAMIN D DEFICIENCY: ICD-10-CM

## 2022-04-11 DIAGNOSIS — I1A.0 RESISTANT HYPERTENSION: Primary | ICD-10-CM

## 2022-04-11 LAB — HBA1C MFR BLD: 6.8 % (ref 0–5.6)

## 2022-04-11 PROCEDURE — 36415 COLL VENOUS BLD VENIPUNCTURE: CPT | Performed by: FAMILY MEDICINE

## 2022-04-11 PROCEDURE — 99213 OFFICE O/P EST LOW 20 MIN: CPT | Performed by: FAMILY MEDICINE

## 2022-04-11 PROCEDURE — 83036 HEMOGLOBIN GLYCOSYLATED A1C: CPT | Performed by: FAMILY MEDICINE

## 2022-04-11 NOTE — PROGRESS NOTES
ASSESMENT AND PLAN:  Diagnoses and all orders for this visit:  Resistant hypertension  Now under good control.  Continue current medications.  This has been worked up extensively in the past, see those notes for details.  Vitamin D deficiency  -     cholecalciferol 50 MCG (2000 UT) CAPS; Take 2,000 Units by mouth daily  Type 2 diabetes mellitus with complication, without long-term current use of insulin (H)  -     Hemoglobin A1c done today shows excellent control.  Routine follow-up in 4 to 6 months.      Reviewed the risks and benefits of the treatment plan with the patient and/or caregiver and we discussed indications for routine and emergent follow-up.        SUBJECTIVE: 61-year-old male in for follow-up.  He has had very difficult to control resistant hypertension, see previous notes for details.  Had extensive work-up of this.  We seem to have found the right combination of medications as his blood pressure is now controlled and he is tolerating his medications well.  Patient has not been having any chest pain or palpitations or shortness of breath.  He is taking his medication as prescribed, including his Metformin for diabetes.  Because of his history of leg amputation is difficult for him to get a lot of cardiovascular exercise but he has been working on his eating habits.  Patient has some fatigue.  He has not been taking vitamin D daily despite his history of vitamin D deficiency.    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)     Post-traumatic Stress Disorder     Myalgia And Myositis     Vitamin D Deficiency     Benign Essential Hypertension     Arthralgias In Multiple Sites     Back Pain     Insomnia     Esophageal Reflux     Limb Pain     Elevated liver enzymes     Chronic lower limb pain     Chronic pain of left lower extremity     Intellectual  disability     History of closed head injury     Pleural effusion     Sepsis (H)     Hyponatremia     Empyema, right (H)     TY (acute kidney injury) (H)     Microcytic anemia     Generalized muscle weakness     Anemia due to blood loss, acute     Right-sided chest pain     Type 2 diabetes mellitus with complication, without long-term current use of insulin (H)     Phantom limb pain (H)     Urinary incontinence     Resistant hypertension     Sinus tachycardia     Current Outpatient Medications   Medication Sig Dispense Refill     amLODIPine (NORVASC) 10 MG tablet [AMLODIPINE (NORVASC) 10 MG TABLET] Take 1 tablet (10 mg total) by mouth daily. 90 tablet 3     cholecalciferol 50 MCG (2000 UT) CAPS Take 2,000 Units by mouth daily 90 capsule 3     gabapentin (NEURONTIN) 300 MG capsule [GABAPENTIN (NEURONTIN) 300 MG CAPSULE] TAKE 1 CAPSULE(300 MG) BY MOUTH TWICE DAILY 180 capsule 3     losartan (COZAAR) 100 MG tablet [LOSARTAN (COZAAR) 100 MG TABLET] Take 1 tablet (100 mg total) by mouth daily. 90 tablet 3     metFORMIN (GLUCOPHAGE) 500 MG tablet [METFORMIN (GLUCOPHAGE) 500 MG TABLET] Take 1 tablet (500 mg total) by mouth 2 (two) times a day with meals. 180 tablet 3     propranolol ER (INDERAL LA) 120 MG 24 hr capsule Take 1 capsule (120 mg) by mouth daily 90 capsule 3     acetaminophen (TYLENOL) 500 MG tablet [ACETAMINOPHEN (TYLENOL) 500 MG TABLET] Take 1 tablet (500 mg total) by mouth every 6 (six) hours as needed for pain. TAKE ONE OR TWO TABLETS BY MOUTH EVERY SIX HOURS AS NEEDED 100 tablet 11     History   Smoking Status     Former Smoker     Quit date: 1/1/2015   Smokeless Tobacco     Former User     Comment: betel nut with tobacco       OBJECTICE: /76 (BP Location: Right arm)   Pulse 114   Temp 99  F (37.2  C) (Oral)   Resp 16   SpO2 95%      Recent Results (from the past 24 hour(s))   Hemoglobin A1c    Collection Time: 04/11/22 12:01 PM   Result Value Ref Range    Hemoglobin A1C 6.8 (H) 0.0 - 5.6 %         CV-regular rate and rhythm with no murmur   RESP-lungs clear to auscultation     Wagner Lamar MD

## 2022-07-10 DIAGNOSIS — I10 ESSENTIAL HYPERTENSION, BENIGN: ICD-10-CM

## 2022-07-10 DIAGNOSIS — Z76.0 ENCOUNTER FOR MEDICATION REFILL: Primary | ICD-10-CM

## 2022-07-10 DIAGNOSIS — E11.8 TYPE 2 DIABETES MELLITUS WITH COMPLICATION, WITHOUT LONG-TERM CURRENT USE OF INSULIN (H): ICD-10-CM

## 2022-07-11 RX ORDER — AMLODIPINE BESYLATE 10 MG/1
TABLET ORAL
Qty: 90 TABLET | Refills: 3 | Status: SHIPPED | OUTPATIENT
Start: 2022-07-11 | End: 2023-02-06

## 2022-07-11 RX ORDER — LOSARTAN POTASSIUM 100 MG/1
TABLET ORAL
Qty: 90 TABLET | Refills: 3 | Status: SHIPPED | OUTPATIENT
Start: 2022-07-11 | End: 2023-02-06

## 2022-10-11 ENCOUNTER — OFFICE VISIT (OUTPATIENT)
Dept: FAMILY MEDICINE | Facility: CLINIC | Age: 62
End: 2022-10-11
Payer: COMMERCIAL

## 2022-10-11 VITALS
TEMPERATURE: 99.4 F | RESPIRATION RATE: 16 BRPM | SYSTOLIC BLOOD PRESSURE: 132 MMHG | HEART RATE: 122 BPM | DIASTOLIC BLOOD PRESSURE: 64 MMHG | OXYGEN SATURATION: 99 %

## 2022-10-11 DIAGNOSIS — M79.605 CHRONIC PAIN OF LEFT LOWER EXTREMITY: ICD-10-CM

## 2022-10-11 DIAGNOSIS — G89.29 CHRONIC PAIN OF LEFT LOWER EXTREMITY: ICD-10-CM

## 2022-10-11 DIAGNOSIS — E11.8 TYPE 2 DIABETES MELLITUS WITH COMPLICATION, WITHOUT LONG-TERM CURRENT USE OF INSULIN (H): ICD-10-CM

## 2022-10-11 DIAGNOSIS — Z23 NEED FOR IMMUNIZATION AGAINST INFLUENZA: ICD-10-CM

## 2022-10-11 DIAGNOSIS — M25.50 PAIN IN JOINT, MULTIPLE SITES: ICD-10-CM

## 2022-10-11 DIAGNOSIS — I1A.0 RESISTANT HYPERTENSION: ICD-10-CM

## 2022-10-11 DIAGNOSIS — G54.6 PHANTOM LIMB PAIN (H): Primary | ICD-10-CM

## 2022-10-11 DIAGNOSIS — Z13.220 SCREENING FOR HYPERLIPIDEMIA: ICD-10-CM

## 2022-10-11 LAB
CHOLEST SERPL-MCNC: 230 MG/DL
HBA1C MFR BLD: 6.9 % (ref 0–5.6)
HDLC SERPL-MCNC: 48 MG/DL
LDLC SERPL CALC-MCNC: 161 MG/DL
NONHDLC SERPL-MCNC: 182 MG/DL
TRIGL SERPL-MCNC: 106 MG/DL

## 2022-10-11 PROCEDURE — 90471 IMMUNIZATION ADMIN: CPT | Performed by: FAMILY MEDICINE

## 2022-10-11 PROCEDURE — 83036 HEMOGLOBIN GLYCOSYLATED A1C: CPT | Performed by: FAMILY MEDICINE

## 2022-10-11 PROCEDURE — 36415 COLL VENOUS BLD VENIPUNCTURE: CPT | Performed by: FAMILY MEDICINE

## 2022-10-11 PROCEDURE — 90682 RIV4 VACC RECOMBINANT DNA IM: CPT | Performed by: FAMILY MEDICINE

## 2022-10-11 PROCEDURE — 99214 OFFICE O/P EST MOD 30 MIN: CPT | Mod: 25 | Performed by: FAMILY MEDICINE

## 2022-10-11 PROCEDURE — 80061 LIPID PANEL: CPT | Performed by: FAMILY MEDICINE

## 2022-10-11 RX ORDER — GABAPENTIN 300 MG/1
CAPSULE ORAL
Qty: 180 CAPSULE | Refills: 3 | Status: SHIPPED | OUTPATIENT
Start: 2022-10-11 | End: 2023-11-08

## 2022-10-11 NOTE — PROGRESS NOTES
ASSESMENT AND PLAN:  Diagnoses and all orders for this visit:  Phantom limb pain (H), Chronic pain of left lower extremity  -     gabapentin (NEURONTIN) 300 MG capsule; [GABAPENTIN (NEURONTIN) 300 MG CAPSULE] TAKE 1 CAPSULE(300 MG) BY MOUTH TWICE DAILY  Resistant hypertension  Now well controlled.  Medication review and counseling done with patient and family with the help of a professional .  Screening for hyperlipidemia  -     Lipid panel reflex to direct LDL Non-fasting; Future  Type 2 diabetes mellitus with complication, without long-term current use of insulin (H)  -     Lipid panel reflex to direct LDL Non-fasting; Future  -     HEMOGLOBIN A1C done today is 6.9.  Congratulated on good control, continue current plan and recheck in 6 months.  Need for immunization against influenza  -     INFLUENZA QUAD, RECOMBINANT, P-FREE (RIV4) (FLUBLOK) AGE 50-64 [RKS649]        Reviewed the risks and benefits of the treatment plan with the patient and/or caregiver and we discussed indications for routine and emergent follow-up.        SUBJECTIVE: 62-year-old male here for follow-up on his diabetes which has been under excellent control.  Taking his medications and tolerating them well.  Blood pressure has also been improved.  His main concern is that he is getting a recurrence of a moderately severe burning pain in the phantom area distal to his amputation of the left leg.  In the past, this had responded well to gabapentin but he no longer has that medication.  It bothers him enough that he would like to take medication for it, and he tolerated the gabapentin well.    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)     Post-traumatic Stress Disorder     Myalgia And Myositis     Vitamin D Deficiency     Benign Essential Hypertension     Arthralgias In Multiple  Sites     Back Pain     Insomnia     Esophageal Reflux     Limb Pain     Elevated liver enzymes     Chronic lower limb pain     Chronic pain of left lower extremity     Intellectual disability     History of closed head injury     Pleural effusion     Sepsis (H)     Hyponatremia     Empyema, right (H)     TY (acute kidney injury) (H)     Microcytic anemia     Generalized muscle weakness     Anemia due to blood loss, acute     Right-sided chest pain     Type 2 diabetes mellitus with complication, without long-term current use of insulin (H)     Phantom limb pain (H)     Urinary incontinence     Resistant hypertension     Sinus tachycardia     Current Outpatient Medications   Medication Sig Dispense Refill     acetaminophen (TYLENOL) 500 MG tablet [ACETAMINOPHEN (TYLENOL) 500 MG TABLET] Take 1 tablet (500 mg total) by mouth every 6 (six) hours as needed for pain. TAKE ONE OR TWO TABLETS BY MOUTH EVERY SIX HOURS AS NEEDED 100 tablet 11     amLODIPine (NORVASC) 10 MG tablet TAKE 1 TABLET(10 MG) BY MOUTH DAILY 90 tablet 3     cholecalciferol 50 MCG (2000 UT) CAPS Take 2,000 Units by mouth daily 90 capsule 3     gabapentin (NEURONTIN) 300 MG capsule [GABAPENTIN (NEURONTIN) 300 MG CAPSULE] TAKE 1 CAPSULE(300 MG) BY MOUTH TWICE DAILY 180 capsule 3     losartan (COZAAR) 100 MG tablet TAKE 1 TABLET(100 MG) BY MOUTH DAILY 90 tablet 3     metFORMIN (GLUCOPHAGE) 500 MG tablet TAKE 1 TABLET(500 MG) BY MOUTH TWICE DAILY WITH MEALS 180 tablet 3     propranolol ER (INDERAL LA) 120 MG 24 hr capsule Take 1 capsule (120 mg) by mouth daily 90 capsule 3     History   Smoking Status     Former     Types: Cigarettes     Quit date: 1/1/2015   Smokeless Tobacco     Former       OBJECTICE: /64   Pulse (!) 122   Temp 99.4  F (37.4  C) (Temporal)   Resp 16   SpO2 99%      Recent Results (from the past 24 hour(s))   HEMOGLOBIN A1C    Collection Time: 10/11/22 11:17 AM   Result Value Ref Range    Hemoglobin A1C 6.9 (H) 0.0 - 5.6 %         GEN-alert, appropriate, in no apparent distress   Musculoskeletal-left leg amputation, prosthesis in place.   CV-regular rate and rhythm   RESP-lungs clear to auscultation   Foot exam- absent left foot.  Right foot exam is normal with palpable pedal pulse and no ulcers.    Wagner Lamar MD

## 2023-01-19 ENCOUNTER — OFFICE VISIT (OUTPATIENT)
Dept: FAMILY MEDICINE | Facility: CLINIC | Age: 63
End: 2023-01-19
Payer: COMMERCIAL

## 2023-01-19 ENCOUNTER — ANCILLARY PROCEDURE (OUTPATIENT)
Dept: GENERAL RADIOLOGY | Facility: CLINIC | Age: 63
End: 2023-01-19
Attending: FAMILY MEDICINE
Payer: COMMERCIAL

## 2023-01-19 VITALS
HEART RATE: 128 BPM | TEMPERATURE: 98.3 F | RESPIRATION RATE: 20 BRPM | DIASTOLIC BLOOD PRESSURE: 80 MMHG | SYSTOLIC BLOOD PRESSURE: 145 MMHG | OXYGEN SATURATION: 97 %

## 2023-01-19 DIAGNOSIS — I10 ESSENTIAL HYPERTENSION, BENIGN: ICD-10-CM

## 2023-01-19 DIAGNOSIS — R07.89 CHEST WALL PAIN: ICD-10-CM

## 2023-01-19 DIAGNOSIS — S78.112S: ICD-10-CM

## 2023-01-19 DIAGNOSIS — D72.828 OTHER ELEVATED WHITE BLOOD CELL (WBC) COUNT: ICD-10-CM

## 2023-01-19 DIAGNOSIS — F43.10 POSTTRAUMATIC STRESS DISORDER: ICD-10-CM

## 2023-01-19 DIAGNOSIS — E11.8 TYPE 2 DIABETES MELLITUS WITH COMPLICATION, WITHOUT LONG-TERM CURRENT USE OF INSULIN (H): ICD-10-CM

## 2023-01-19 DIAGNOSIS — G54.6 PHANTOM LIMB PAIN (H): Primary | ICD-10-CM

## 2023-01-19 DIAGNOSIS — I1A.0 RESISTANT HYPERTENSION: ICD-10-CM

## 2023-01-19 LAB
ANION GAP SERPL CALCULATED.3IONS-SCNC: 16 MMOL/L (ref 7–15)
BUN SERPL-MCNC: 4.8 MG/DL (ref 8–23)
CALCIUM SERPL-MCNC: 9.3 MG/DL (ref 8.8–10.2)
CHLORIDE SERPL-SCNC: 102 MMOL/L (ref 98–107)
CREAT SERPL-MCNC: 0.75 MG/DL (ref 0.67–1.17)
CREAT UR-MCNC: 132 MG/DL
DEPRECATED HCO3 PLAS-SCNC: 22 MMOL/L (ref 22–29)
ERYTHROCYTE [DISTWIDTH] IN BLOOD BY AUTOMATED COUNT: 14.5 % (ref 10–15)
GFR SERPL CREATININE-BSD FRML MDRD: >90 ML/MIN/1.73M2
GLUCOSE SERPL-MCNC: 166 MG/DL (ref 70–99)
HCT VFR BLD AUTO: 42.8 % (ref 40–53)
HGB BLD-MCNC: 13.3 G/DL (ref 13.3–17.7)
MCH RBC QN AUTO: 23.1 PG (ref 26.5–33)
MCHC RBC AUTO-ENTMCNC: 31.1 G/DL (ref 31.5–36.5)
MCV RBC AUTO: 74 FL (ref 78–100)
MICROALBUMIN UR-MCNC: 107 MG/L
MICROALBUMIN/CREAT UR: 81.06 MG/G CR (ref 0–17)
PLATELET # BLD AUTO: 140 10E3/UL (ref 150–450)
POTASSIUM SERPL-SCNC: 3.7 MMOL/L (ref 3.4–5.3)
RBC # BLD AUTO: 5.75 10E6/UL (ref 4.4–5.9)
SODIUM SERPL-SCNC: 140 MMOL/L (ref 136–145)
WBC # BLD AUTO: 16.5 10E3/UL (ref 4–11)

## 2023-01-19 PROCEDURE — 80048 BASIC METABOLIC PNL TOTAL CA: CPT | Performed by: FAMILY MEDICINE

## 2023-01-19 PROCEDURE — 99214 OFFICE O/P EST MOD 30 MIN: CPT | Performed by: FAMILY MEDICINE

## 2023-01-19 PROCEDURE — 85027 COMPLETE CBC AUTOMATED: CPT | Performed by: FAMILY MEDICINE

## 2023-01-19 PROCEDURE — 71046 X-RAY EXAM CHEST 2 VIEWS: CPT | Mod: TC | Performed by: RADIOLOGY

## 2023-01-19 PROCEDURE — 82570 ASSAY OF URINE CREATININE: CPT | Performed by: FAMILY MEDICINE

## 2023-01-19 PROCEDURE — 36415 COLL VENOUS BLD VENIPUNCTURE: CPT | Performed by: FAMILY MEDICINE

## 2023-01-19 PROCEDURE — 82043 UR ALBUMIN QUANTITATIVE: CPT | Performed by: FAMILY MEDICINE

## 2023-01-19 NOTE — PROGRESS NOTES
Assessment & Plan     Phantom limb pain (H)    Patient's phantom pain is under control the gabapentin is not increasing.  He has been taking the medication faithfully.  Type 2 diabetes mellitus with complication, without long-term current use of insulin (H)    Blood sugars have been well controlled as traditionally his A1c has been below 7.  His family reports that his blood pressures and blood sugars have not been elevated  - Albumin Random Urine Quantitative with Creat Ratio; Future    Benign Essential Hypertension  She has not been feeling well today he has had an elevated blood pressure because of the pain he denies missing any of his medications.    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    Resistant hypertension    Blood pressure is elevated repeat BMP chest pain  - BASIC METABOLIC PANEL; Future  - CBC with platelets; Future    Chest wall pain    Reproducible chest wall pain on the right hemithorax and left side of the surgical incision no radiating pain breath sounds are normal is noted auscultatory exam chest x-ray today reveals no evidence of pneumothorax.  There is no evidence of elevation of the hemidiaphragm except from chronic right hemidiaphragm elevation that was present on the past.  No evidence of infiltrate.  Results were shared with the patient and his daughter    Given the history of the patient and the resulting leukocytosis we have prudent to empirically treat him with antibiotic coverage and have him follow-up in clinic also prescribing Voltaren to help with the discomfort of the pain increases or if he develops a fever he is instructed to go to the emergency room immediately he understood my directions  - XR Chest 2 Views;  Future  - CBC with platelets; Future    Leukocytosis   elevated white count beyond 16,000 patient has no fever no abdominal pain no cough is complaining of pain at the site of a prior focus of the empyema.  Not coughing.             No follow-ups on file.    Davonte Mckenzie MD  [facility] KATHLEEN Allen   Schuyler is a 62 year old accompanied by his daughter, presenting for the following health issues:  Pain (Side of abdomen- daughter says he had pneumonia and surgery recently )    62-year-old male here for follow-up he usually sees Dr. Lamar.  Medical history significant for well-controlled type 2 diabetes, hypertension, PTSD, traumatic amputation of lower extremity and phantom limb pain.  He reports has been taking his medications faithfully he did not bring the medications in today.  He says his phantom limb pain is better with the gabapentin which she has been using regularly.  He says his blood sugars are not high last night he had excruciating pain on the right chest wall which is the site of a prior infection which is diagnosis empyema and needed to be treated with paracentesis.  The pain started in the same manner last year and he is worried that he may be getting a lung infection again he admits that he is slightly fatigued yesterday his daughter states that she has not noticed him to have a fever he has not had any vomiting he has not been more fatigued.  The pain is present all the time its worse when he breathes is present on the incision site in the right hemithorax.    Patient denies chest pain he denies wheezing he denies any pain when he is urinating he has no diarrhea no infectious contacts have been noted  History of Present Illness       Reason for visit:  Pain              Review of Systems   Constitutional, HEENT, cardiovascular, pulmonary, gi and gu systems are negative, except as otherwise noted.      Objective    Physical Exam   Interactive elderly appearing male sitting in exam  room no acute distress  HEENT neck supple mucous members moist oral cavity shows no exudate no erythema  Respiratory system clear to auscultation equal breath sounds no wheezes no crackles  Musculoskeletal exam tenderness noted over the right hemithorax at the site of his previous incision.  There is no swelling noted.  Skin warm well perfused surgical scar appears to be nontender.  Abdomen soft there is no focal tenderness bowel sounds are present no organomegaly no fluid thrill noted no evidence of ascites  Skin survey is intact no evidence of any scars

## 2023-01-19 NOTE — PATIENT INSTRUCTIONS
Was a pleasure to see you today in clinic I did do an x-ray for you which reveals no fluid in your lungs.  You are having chest wall pain and it should be noted that today your blood test was not normal it shows an elevated white count which means you could be coming down with an infection.    Because of your past history with a lung infection I am treating you with antibiotics today please take these medications twice daily and would like to see you back in clinic in 10 days to make sure you doing well      If your pain increases or if you develop a high fever please go to the emergency room

## 2023-02-06 ENCOUNTER — OFFICE VISIT (OUTPATIENT)
Dept: FAMILY MEDICINE | Facility: CLINIC | Age: 63
End: 2023-02-06
Payer: COMMERCIAL

## 2023-02-06 VITALS
OXYGEN SATURATION: 97 % | BODY MASS INDEX: 29.65 KG/M2 | WEIGHT: 161.12 LBS | SYSTOLIC BLOOD PRESSURE: 136 MMHG | TEMPERATURE: 98.9 F | HEIGHT: 62 IN | RESPIRATION RATE: 16 BRPM | DIASTOLIC BLOOD PRESSURE: 76 MMHG | HEART RATE: 119 BPM

## 2023-02-06 DIAGNOSIS — Z23 NEED FOR VACCINATION: ICD-10-CM

## 2023-02-06 DIAGNOSIS — R80.9 TYPE 2 DIABETES MELLITUS WITH MICROALBUMINURIA, WITHOUT LONG-TERM CURRENT USE OF INSULIN (H): Primary | ICD-10-CM

## 2023-02-06 DIAGNOSIS — D69.6 THROMBOCYTOPENIA (H): ICD-10-CM

## 2023-02-06 DIAGNOSIS — I10 ESSENTIAL HYPERTENSION, BENIGN: ICD-10-CM

## 2023-02-06 DIAGNOSIS — R00.0 SINUS TACHYCARDIA: ICD-10-CM

## 2023-02-06 DIAGNOSIS — E11.29 TYPE 2 DIABETES MELLITUS WITH MICROALBUMINURIA, WITHOUT LONG-TERM CURRENT USE OF INSULIN (H): Primary | ICD-10-CM

## 2023-02-06 DIAGNOSIS — F43.10 POSTTRAUMATIC STRESS DISORDER: ICD-10-CM

## 2023-02-06 PROCEDURE — 99214 OFFICE O/P EST MOD 30 MIN: CPT | Performed by: FAMILY MEDICINE

## 2023-02-06 RX ORDER — AMLODIPINE BESYLATE 10 MG/1
10 TABLET ORAL DAILY
Qty: 90 TABLET | Refills: 3 | Status: SHIPPED | OUTPATIENT
Start: 2023-02-06 | End: 2023-04-11

## 2023-02-06 RX ORDER — LOSARTAN POTASSIUM 100 MG/1
100 TABLET ORAL DAILY
Qty: 90 TABLET | Refills: 3 | Status: SHIPPED | OUTPATIENT
Start: 2023-02-06 | End: 2023-04-11

## 2023-02-06 RX ORDER — PROPRANOLOL HYDROCHLORIDE 80 MG/1
80 CAPSULE, EXTENDED RELEASE ORAL DAILY
Qty: 90 CAPSULE | Refills: 3 | Status: SHIPPED | OUTPATIENT
Start: 2023-02-06 | End: 2023-12-04

## 2023-02-06 NOTE — PROGRESS NOTES
ASSESMENT AND PLAN:  Diagnoses and all orders for this visit:  Type 2 diabetes mellitus with microalbuminuria, without long-term current use of insulin (H)  Good control.  Reviewed A1c trend with the patient and his family with the help of a professional .  Will be due for follow-up in April.  Essential hypertension, benign  Well-controlled.  Medication review and counseling done, missing propanolol which will be added back as detailed below.  -     amLODIPine (NORVASC) 10 MG tablet; Take 1 tablet (10 mg) by mouth daily  -     losartan (COZAAR) 100 MG tablet; Take 1 tablet (100 mg) by mouth daily  -     propranolol ER (INDERAL LA) 80 MG 24 hr capsule; Take 1 capsule (80 mg) by mouth daily  Sinus tachycardia  Reviewed previous evaluation, previous EKGs showed sinus tachycardia.  Off propanolol.  Counseling done with the patient and family with the help of a professional , we decided to restart propanolol at 80 mg daily.  -     propranolol ER (INDERAL LA) 80 MG 24 hr capsule; Take 1 capsule (80 mg) by mouth daily  Post-traumatic Stress Disorder  Stable.  We will see if he gets some benefit from the addition of propanolol.  Follow-up in 2 months for recheck.  Thrombocytopenia (H)  CBC done a couple of weeks ago came back with a mild thrombocytopenia as well as a mild leukocytosis.  We will plan to recheck his CBC in 2 months at his follow-up visit.  Need for vaccination  Recommended pneumococcal vaccination and COVID vaccination, patient declines immunization update today, he is not interested in the COVID-vaccine but might potentially get the pneumococcal vaccination but prefers to talk about it further at his next visit.    Reviewed the risks and benefits of the treatment plan with the patient and/or caregiver and we discussed indications for routine and emergent follow-up.        SUBJECTIVE: Patient is here for follow-up.  He was seen by my partner here at the clinic and treated with  amoxicillin/clavulanate, completed course of treatment, his chest pain has resolved completely.  He is feeling back to normal.  Reviewed his chest x-ray which ended up being negative.  Patient did have a mild elevation in his white count at that visit.  Currently not having any fevers or chills.  Patient brings all of his medications in today that he is currently taking, he is missing propanolol.  He has a history of resistant hypertension but his blood pressure is currently under good control.  He also has a history of sinus tachycardia and PTSD.  It is unclear why he stopped the propanolol, from discussion with the patient and his caregivers it sounds like it was just a missed refill at pharmacy.    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)     Post-traumatic Stress Disorder     Myalgia And Myositis     Vitamin D Deficiency     Benign Essential Hypertension     Arthralgias In Multiple Sites     Back Pain     Insomnia     Esophageal Reflux     Limb Pain     Elevated liver enzymes     Chronic lower limb pain     Chronic pain of left lower extremity     Intellectual disability     History of closed head injury     Pleural effusion     Sepsis (H)     Hyponatremia     Empyema, right (H)     TY (acute kidney injury) (H)     Microcytic anemia     Generalized muscle weakness     Anemia due to blood loss, acute     Right-sided chest pain     Type 2 diabetes mellitus with microalbuminuria, without long-term current use of insulin (H)     Phantom limb pain (H)     Urinary incontinence     Resistant hypertension     Sinus tachycardia     Current Outpatient Medications   Medication Sig Dispense Refill     acetaminophen (TYLENOL) 500 MG tablet [ACETAMINOPHEN (TYLENOL) 500 MG TABLET] Take 1 tablet (500 mg total) by mouth every 6 (six) hours as needed for pain. TAKE ONE  "OR TWO TABLETS BY MOUTH EVERY SIX HOURS AS NEEDED 100 tablet 11     amLODIPine (NORVASC) 10 MG tablet Take 1 tablet (10 mg) by mouth daily 90 tablet 3     cholecalciferol 50 MCG (2000 UT) CAPS Take 2,000 Units by mouth daily 90 capsule 3     diclofenac (VOLTAREN) 1 % topical gel Apply 4 g topically 4 times daily 350 g 4     gabapentin (NEURONTIN) 300 MG capsule [GABAPENTIN (NEURONTIN) 300 MG CAPSULE] TAKE 1 CAPSULE(300 MG) BY MOUTH TWICE DAILY 180 capsule 3     losartan (COZAAR) 100 MG tablet Take 1 tablet (100 mg) by mouth daily 90 tablet 3     metFORMIN (GLUCOPHAGE) 500 MG tablet TAKE 1 TABLET(500 MG) BY MOUTH TWICE DAILY WITH MEALS 180 tablet 3     propranolol ER (INDERAL LA) 80 MG 24 hr capsule Take 1 capsule (80 mg) by mouth daily 90 capsule 3     History   Smoking Status     Former     Types: Cigarettes     Quit date: 1/1/2015   Smokeless Tobacco     Former       OBJECTICE: /76   Pulse 119   Temp 98.9  F (37.2  C) (Oral)   Resp 16   Ht 1.57 m (5' 1.81\")   Wt 73.1 kg (161 lb 1.9 oz)   SpO2 97%   BMI 29.65 kg/m       No results found for this or any previous visit (from the past 24 hour(s)).     GEN-alert, appropriate, in no apparent distress   CV-regular tachycardia with no murmur   RESP-lungs clear to auscultation       Wagner Lamar MD     "

## 2023-04-11 ENCOUNTER — OFFICE VISIT (OUTPATIENT)
Dept: FAMILY MEDICINE | Facility: CLINIC | Age: 63
End: 2023-04-11
Payer: COMMERCIAL

## 2023-04-11 VITALS
DIASTOLIC BLOOD PRESSURE: 77 MMHG | HEART RATE: 124 BPM | HEIGHT: 62 IN | TEMPERATURE: 98.8 F | OXYGEN SATURATION: 99 % | WEIGHT: 161 LBS | BODY MASS INDEX: 29.63 KG/M2 | SYSTOLIC BLOOD PRESSURE: 135 MMHG | RESPIRATION RATE: 20 BRPM

## 2023-04-11 DIAGNOSIS — E11.29 TYPE 2 DIABETES MELLITUS WITH MICROALBUMINURIA, WITHOUT LONG-TERM CURRENT USE OF INSULIN (H): ICD-10-CM

## 2023-04-11 DIAGNOSIS — D69.6 THROMBOCYTOPENIA (H): Primary | ICD-10-CM

## 2023-04-11 DIAGNOSIS — I10 ESSENTIAL HYPERTENSION, BENIGN: ICD-10-CM

## 2023-04-11 DIAGNOSIS — R80.9 TYPE 2 DIABETES MELLITUS WITH MICROALBUMINURIA, WITHOUT LONG-TERM CURRENT USE OF INSULIN (H): ICD-10-CM

## 2023-04-11 LAB
ERYTHROCYTE [DISTWIDTH] IN BLOOD BY AUTOMATED COUNT: 15.8 % (ref 10–15)
HBA1C MFR BLD: 6.8 % (ref 0–5.6)
HCT VFR BLD AUTO: 45.2 % (ref 40–53)
HGB BLD-MCNC: 13.6 G/DL (ref 13.3–17.7)
MCH RBC QN AUTO: 22.7 PG (ref 26.5–33)
MCHC RBC AUTO-ENTMCNC: 30.1 G/DL (ref 31.5–36.5)
MCV RBC AUTO: 76 FL (ref 78–100)
PLATELET # BLD AUTO: 161 10E3/UL (ref 150–450)
RBC # BLD AUTO: 5.98 10E6/UL (ref 4.4–5.9)
WBC # BLD AUTO: 6.3 10E3/UL (ref 4–11)

## 2023-04-11 PROCEDURE — 85027 COMPLETE CBC AUTOMATED: CPT | Performed by: FAMILY MEDICINE

## 2023-04-11 PROCEDURE — 36415 COLL VENOUS BLD VENIPUNCTURE: CPT | Performed by: FAMILY MEDICINE

## 2023-04-11 PROCEDURE — 99214 OFFICE O/P EST MOD 30 MIN: CPT | Mod: 25 | Performed by: FAMILY MEDICINE

## 2023-04-11 PROCEDURE — 90471 IMMUNIZATION ADMIN: CPT | Performed by: FAMILY MEDICINE

## 2023-04-11 PROCEDURE — 83036 HEMOGLOBIN GLYCOSYLATED A1C: CPT | Performed by: FAMILY MEDICINE

## 2023-04-11 PROCEDURE — 90677 PCV20 VACCINE IM: CPT | Performed by: FAMILY MEDICINE

## 2023-04-11 RX ORDER — LOSARTAN POTASSIUM 100 MG/1
100 TABLET ORAL DAILY
Qty: 90 TABLET | Refills: 3 | Status: SHIPPED | OUTPATIENT
Start: 2023-04-11 | End: 2024-02-12

## 2023-04-11 RX ORDER — AMLODIPINE BESYLATE 10 MG/1
10 TABLET ORAL DAILY
Qty: 90 TABLET | Refills: 3 | Status: SHIPPED | OUTPATIENT
Start: 2023-04-11 | End: 2024-02-12

## 2023-04-11 NOTE — PROGRESS NOTES
ASSESMENT AND PLAN:  Diagnoses and all orders for this visit:  Thrombocytopenia (H)  No bleeding problems.  Due for labs.  -     CBC with platelets; Future    Type 2 diabetes mellitus with microalbuminuria, without long-term current use of insulin (H)  -     Pneumococcal 20 Valent Conjugate (Prevnar 20)  -     HEMOGLOBIN A1C done today shows excellent control, continue current treatment plan and recheck again in 4 to 6 months.      Essential hypertension, benign  Now under good control.  Continue triple drug therapy, continue propanolol and to the 2 medications as refilled below:  -     losartan (COZAAR) 100 MG tablet; Take 1 tablet (100 mg) by mouth daily  -     amLODIPine (NORVASC) 10 MG tablet; Take 1 tablet (10 mg) by mouth daily      Reviewed the risks and benefits of the treatment plan with the patient and/or caregiver and we discussed indications for routine and emergent follow-up.        SUBJECTIVE: Patient here for his follow-up.  He is due for labs to recheck on thrombocytopenia, his last platelet count about 2 months ago was mildly diminished.  He has not been having any bleeding issues or problems.  He is also due for follow-up on his type 2 diabetes which has been under good control.  He has a history of difficult to control hypertension but has now gotten under control on 3 drug therapy.  No issues or problems with any of his medications.    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)     Post-traumatic Stress Disorder     Myalgia And Myositis     Vitamin D Deficiency     Benign Essential Hypertension     Arthralgias In Multiple Sites     Back Pain     Insomnia     Esophageal Reflux     Limb Pain     Elevated liver enzymes     Chronic lower limb pain     Chronic pain of left lower extremity     Intellectual disability     History of closed head  "injury     Pleural effusion     Sepsis (H)     Hyponatremia     Empyema, right (H)     TY (acute kidney injury) (H)     Microcytic anemia     Generalized muscle weakness     Anemia due to blood loss, acute     Right-sided chest pain     Type 2 diabetes mellitus with microalbuminuria, without long-term current use of insulin (H)     Phantom limb pain (H)     Urinary incontinence     Resistant hypertension     Sinus tachycardia     Current Outpatient Medications   Medication Sig Dispense Refill     amLODIPine (NORVASC) 10 MG tablet Take 1 tablet (10 mg) by mouth daily 90 tablet 3     gabapentin (NEURONTIN) 300 MG capsule [GABAPENTIN (NEURONTIN) 300 MG CAPSULE] TAKE 1 CAPSULE(300 MG) BY MOUTH TWICE DAILY 180 capsule 3     losartan (COZAAR) 100 MG tablet Take 1 tablet (100 mg) by mouth daily 90 tablet 3     metFORMIN (GLUCOPHAGE) 500 MG tablet TAKE 1 TABLET(500 MG) BY MOUTH TWICE DAILY WITH MEALS 180 tablet 3     propranolol ER (INDERAL LA) 80 MG 24 hr capsule Take 1 capsule (80 mg) by mouth daily 90 capsule 3     acetaminophen (TYLENOL) 500 MG tablet [ACETAMINOPHEN (TYLENOL) 500 MG TABLET] Take 1 tablet (500 mg total) by mouth every 6 (six) hours as needed for pain. TAKE ONE OR TWO TABLETS BY MOUTH EVERY SIX HOURS AS NEEDED 100 tablet 11     cholecalciferol 50 MCG (2000 UT) CAPS Take 2,000 Units by mouth daily 90 capsule 3     diclofenac (VOLTAREN) 1 % topical gel Apply 4 g topically 4 times daily 350 g 4     History   Smoking Status     Former     Types: Cigarettes     Quit date: 1/1/2015   Smokeless Tobacco     Former       OBJECTICE: /77   Pulse (!) 124   Temp 98.8  F (37.1  C) (Oral)   Resp 20   Ht 1.568 m (5' 1.75\")   Wt 73 kg (161 lb)   SpO2 99%   BMI 29.69 kg/m       Recent Results (from the past 24 hour(s))   HEMOGLOBIN A1C    Collection Time: 04/11/23 11:38 AM   Result Value Ref Range    Hemoglobin A1C 6.8 (H) 0.0 - 5.6 %        GEN-alert, appropriate, in no apparent distress   Cardiac-regular " tachycardia with no murmur   RESP-lungs clear to auscultation       Wagner Lamar MD         4/11/2023    11:27 AM   Additional Questions   Roomed by Carlos   Accompanied by Daughter     History of Present Illness       Diabetes:   He presents for follow up of diabetes.  He is not checking blood glucose. He has no concerns regarding his diabetes at this time.  He is not experiencing numbness or burning in feet, excessive thirst, blurry vision, weight changes or redness, sores or blisters on feet. The patient has had a diabetic eye exam in the last 12 months. Eye exam performed on 1 year ago. Location of last eye exam [facility] eye [facility].        Hypertension: He presents for follow up of hypertension.  He does not check blood pressure  regularly outside of the clinic. Outpatient blood pressures have not been over 140/90. He follows a low salt diet.     He eats 0-1 servings of fruits and vegetables daily.He consumes 1 sweetened beverage(s) daily.He exercises with enough effort to increase his heart rate 9 or less minutes per day.  He exercises with enough effort to increase his heart rate 3 or less days per week.   He is taking medications regularly.

## 2023-07-14 DIAGNOSIS — E11.8 TYPE 2 DIABETES MELLITUS WITH COMPLICATION, WITHOUT LONG-TERM CURRENT USE OF INSULIN (H): ICD-10-CM

## 2023-07-14 DIAGNOSIS — Z76.0 ENCOUNTER FOR MEDICATION REFILL: Primary | ICD-10-CM

## 2023-07-14 NOTE — TELEPHONE ENCOUNTER
"Last Written Prescription Date:  7/11/22  Last Fill Quantity: 180,  # refills: 3   Last office visit provider:  4/11/23     Requested Prescriptions   Pending Prescriptions Disp Refills     metFORMIN (GLUCOPHAGE) 500 MG tablet 180 tablet 3     Sig: Take 1 tablet (500 mg) by mouth 2 times daily (with meals)       Biguanide Agents Passed - 7/14/2023  9:52 AM        Passed - Patient is age 10 or older        Passed - Patient has documented A1c within the specified period of time.     If HgbA1C is 8 or greater, it needs to be on file within the past 3 months.  If less than 8, must be on file within the past 6 months.     Recent Labs   Lab Test 04/11/23  1138   A1C 6.8*             Passed - Patient's CR is NOT>1.4 OR Patient's EGFR is NOT<45 within past 12 mos.     Recent Labs   Lab Test 01/19/23  1525 12/08/21  1149 12/24/20  1152   GFRESTIMATED >90   < > >60   GFRESTBLACK  --   --  >60    < > = values in this interval not displayed.       Recent Labs   Lab Test 01/19/23  1525   CR 0.75             Passed - Patient does NOT have a diagnosis of CHF.        Passed - Medication is active on med list        Passed - Recent (6 mo) or future (30 days) visit within the authorizing provider's specialty     Patient had office visit in the last 6 months or has a visit in the next 30 days with authorizing provider or within the authorizing provider's specialty.  See \"Patient Info\" tab in inbasket, or \"Choose Columns\" in Meds & Orders section of the refill encounter.                 Jorgito Aponte RN 07/14/23 9:58 AM  "

## 2023-08-02 NOTE — PROGRESS NOTES
Called [facility] [address] and they do have Adderral 20mg instock.     PDMP reviewed and last picked up Adderall 20mg on 7/3/23   Clinic Care Coordination Contact  Patient has completed all goals with Clinic Care Coordination.  Please review the chart and confirm if graduation is approved.    -Patient did not address or establish new goal with CHW on today follow up.  -Patient does not need assistance with coordination and no resource needed.  -Patient's PCA hours was reduced to 1 hour and 45 minutes, re-assessment submitted about 2 weeks ago and patient will be called to schedule PCA assessment in the next 2 to 3 weeks.  -[place] other [place] benefits.  -CHW routes this outreach encounter to CCC RN for further chart review to graduate patient from [facility] and resolve Episode if appropriate.

## 2023-08-07 ENCOUNTER — LAB (OUTPATIENT)
Dept: FAMILY MEDICINE | Facility: CLINIC | Age: 63
End: 2023-08-07

## 2023-08-07 ENCOUNTER — OFFICE VISIT (OUTPATIENT)
Dept: FAMILY MEDICINE | Facility: CLINIC | Age: 63
End: 2023-08-07
Payer: COMMERCIAL

## 2023-08-07 VITALS
SYSTOLIC BLOOD PRESSURE: 136 MMHG | HEART RATE: 129 BPM | OXYGEN SATURATION: 99 % | TEMPERATURE: 98 F | DIASTOLIC BLOOD PRESSURE: 79 MMHG | RESPIRATION RATE: 16 BRPM

## 2023-08-07 DIAGNOSIS — E11.29 TYPE 2 DIABETES MELLITUS WITH MICROALBUMINURIA, WITHOUT LONG-TERM CURRENT USE OF INSULIN (H): Primary | ICD-10-CM

## 2023-08-07 DIAGNOSIS — Z12.11 SCREEN FOR COLON CANCER: ICD-10-CM

## 2023-08-07 DIAGNOSIS — E55.9 VITAMIN D DEFICIENCY: ICD-10-CM

## 2023-08-07 DIAGNOSIS — R80.9 TYPE 2 DIABETES MELLITUS WITH MICROALBUMINURIA, WITHOUT LONG-TERM CURRENT USE OF INSULIN (H): Primary | ICD-10-CM

## 2023-08-07 DIAGNOSIS — I10 ESSENTIAL HYPERTENSION, BENIGN: ICD-10-CM

## 2023-08-07 DIAGNOSIS — S78.112S: ICD-10-CM

## 2023-08-07 DIAGNOSIS — G54.6 PHANTOM LIMB PAIN (H): ICD-10-CM

## 2023-08-07 DIAGNOSIS — R00.0 SINUS TACHYCARDIA: ICD-10-CM

## 2023-08-07 LAB — HBA1C MFR BLD: 7 % (ref 0–5.6)

## 2023-08-07 PROCEDURE — 99214 OFFICE O/P EST MOD 30 MIN: CPT | Performed by: FAMILY MEDICINE

## 2023-08-07 PROCEDURE — 83036 HEMOGLOBIN GLYCOSYLATED A1C: CPT | Performed by: FAMILY MEDICINE

## 2023-08-07 PROCEDURE — 36415 COLL VENOUS BLD VENIPUNCTURE: CPT | Performed by: FAMILY MEDICINE

## 2023-08-07 NOTE — PROGRESS NOTES
ASSESMENT AND PLAN:  Diagnoses and all orders for this visit:  Type 2 diabetes mellitus with microalbuminuria, without long-term current use of insulin (H)  -     Adult Eye  Referral; Future  -     Hemoglobin A1c done today comes back at 7.0 showing good control.  Continue current plan and recheck again in 4 to 6 months.  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  I am coordinating with our specialty scheduling team to get this set up.  I also gave a written prescription for a push wheelchair.  This is medically necessary to provide safe ambulation inside and outside of the home.  The patient does have the upper extremity strength to move the push wheelchair and he has family members that are willing and able to help him.  This will increase his ability to safely ambulate in the community and at home.  Medically necessary because of his history of above-the-knee traumatic amputation.  The patient's home has adequate space to maneuver the wheelchair.  Patient and family are willing and desiring to use this resource.  -     [facility] Prost[facility] DME Prosthetic Above the Knee (AK); Prosthesis with (s)/Sock(s); Left  Phantom limb pain (H)  Stable.  Continue gabapentin which provides good control.  Sinus tachycardia  Stable and asymptomatic on propranolol.  Continue propranolol.  Benign Essential Hypertension  Stable, borderline control.  Could consider increasing his dose of propranolol next visit.  Screen for colon cancer  -     DEYANIRA([employer]); Future  Vitamin D deficiency  Restart vitamin D.  -     cholecalciferol 50 MCG (2000 UT) CAPS; Take 2,000 Units by mouth daily      Reviewed the risks and benefits of the treatment plan with the patient and/or caregiver and we discussed indications for routine and emergent follow-up.        SUBJECTIVE: 62-year-old male has a history of a left leg above-the-knee amputation, see  previous notes for details.  His daughter is with him today and reports that his prosthesis is over 10 years old and parts of it have degraded to the point where he is not able to use anymore.  Therefore he has been using a push wheelchair for ambulation, the current pressure wheelchair that they have at his broken and not easy to use.  Patient has a history of phantom limb pain in his left side lower extremity.  It has been well-controlled with gabapentin, patient continues to use that medication and is happy with his control.  He ran out of vitamin D and has not restarted it, has a known history of vitamin D deficiency.    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)    Post-traumatic Stress Disorder    Myalgia And Myositis    Vitamin D Deficiency    Benign Essential Hypertension    Arthralgias In Multiple Sites    Back Pain    Insomnia    Esophageal Reflux    Limb Pain    Elevated liver enzymes    Chronic lower limb pain    Chronic pain of left lower extremity    Intellectual disability    History of closed head injury    Pleural effusion    Sepsis (H)    Hyponatremia    Empyema, right (H)    TY (acute kidney injury) (H)    Microcytic anemia    Generalized muscle weakness    Anemia due to blood loss, acute    Right-sided chest pain    Type 2 diabetes mellitus with microalbuminuria, without long-term current use of insulin (H)    Phantom limb pain (H)    Urinary incontinence    Resistant hypertension    Sinus tachycardia     Current Outpatient Medications   Medication Sig Dispense Refill    acetaminophen (TYLENOL) 500 MG tablet [ACETAMINOPHEN (TYLENOL) 500 MG TABLET] Take 1 tablet (500 mg total) by mouth every 6 (six) hours as needed for pain. TAKE ONE OR TWO TABLETS BY MOUTH EVERY SIX HOURS AS NEEDED 100 tablet 11    amLODIPine (NORVASC) 10 MG tablet Take 1  tablet (10 mg) by mouth daily 90 tablet 3    cholecalciferol 50 MCG (2000 UT) CAPS Take 2,000 Units by mouth daily 90 capsule 3    diclofenac (VOLTAREN) 1 % topical gel Apply 4 g topically 4 times daily 350 g 4    gabapentin (NEURONTIN) 300 MG capsule [GABAPENTIN (NEURONTIN) 300 MG CAPSULE] TAKE 1 CAPSULE(300 MG) BY MOUTH TWICE DAILY 180 capsule 3    losartan (COZAAR) 100 MG tablet Take 1 tablet (100 mg) by mouth daily 90 tablet 3    metFORMIN (GLUCOPHAGE) 500 MG tablet Take 1 tablet (500 mg) by mouth 2 times daily (with meals) 180 tablet 0    propranolol ER (INDERAL LA) 80 MG 24 hr capsule Take 1 capsule (80 mg) by mouth daily 90 capsule 3     History   Smoking Status    Former    Types: Cigarettes    Quit date: 1/1/2015   Smokeless Tobacco    Former       OBJECTICE: /79   Pulse (!) 129   Temp 98  F (36.7  C) (Oral)   Resp 16   SpO2 99%      Recent Results (from the past 24 hour(s))   Hemoglobin A1c    Collection Time: 08/07/23  1:03 PM   Result Value Ref Range    Hemoglobin A1C 7.0 (H) 0.0 - 5.6 %        CV-regular tachycardia with no murmur   RESP-lungs clear to auscultation   EXTREM-no edema, unchanged left leg above-the-knee amputation   SKIN-no ulcers or vesicles or areas of dermatitis in the left leg stump area      Wagner Lamar MD

## 2023-09-21 ENCOUNTER — TELEPHONE (OUTPATIENT)
Dept: PHYSICAL MEDICINE AND REHAB | Facility: CLINIC | Age: 63
End: 2023-09-21

## 2023-09-21 NOTE — TELEPHONE ENCOUNTER
LPN called pt using  services. Pt's Family member (daughter) answered and acknowledged that they received the message that today's appointment with Dr. Coelho was cancelled for the Amputee clinic, and that they are scheduled for next week at 2 pm.   LPN confirmed that pt will be seen at the same location.     Pt and family had no other questions or concerns.     Alia Zelaya LPN

## 2023-09-21 NOTE — TELEPHONE ENCOUNTER
Reached out to pt along with  (Tessy) we had to LVM stated appt for today with Dr Coelho is canceled due to MD out ill, however rescheduled for 9/28 at 2pm, asked to call back for confirmation.

## 2023-09-28 ENCOUNTER — DOCUMENTATION ONLY (OUTPATIENT)
Dept: PHYSICAL MEDICINE AND REHAB | Facility: CLINIC | Age: 63
End: 2023-09-28

## 2023-09-28 ENCOUNTER — OFFICE VISIT (OUTPATIENT)
Dept: PHYSICAL MEDICINE AND REHAB | Facility: CLINIC | Age: 63
End: 2023-09-28
Payer: COMMERCIAL

## 2023-09-28 VITALS — DIASTOLIC BLOOD PRESSURE: 74 MMHG | SYSTOLIC BLOOD PRESSURE: 142 MMHG

## 2023-09-28 DIAGNOSIS — Z89.612 S/P AKA (ABOVE KNEE AMPUTATION) UNILATERAL, LEFT (H): Primary | ICD-10-CM

## 2023-09-28 PROCEDURE — 99204 OFFICE O/P NEW MOD 45 MIN: CPT | Performed by: PHYSICAL MEDICINE & REHABILITATION

## 2023-09-28 NOTE — PROGRESS NOTES
Chart Prep for Initial Evaluation with Dr. Coelho on 23.     Office visit of Dr. Dominick Lamar (PCP) mentions amputation in notes from 12/10/15- 23, But does not specify Etiology or date of surgery.            PM&R Clinic Note     Patient Name: Schuyler Goodson : 1960 Medical Record: [id number]     Requesting Physician/clinician: Wagner Lamar MD      Chief Complaint: Evaluation of K Level and Treatment planning for Prothesis.     Level of Amputation:  Left Above Knee Amputation    : None Listed.     Date of Surgery:    Etiology:     Alia Zelaya LPN
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
## 2023-10-13 ENCOUNTER — DOCUMENTATION ONLY (OUTPATIENT)
Dept: PHYSICAL MEDICINE AND REHAB | Facility: CLINIC | Age: 63
End: 2023-10-13
Payer: COMMERCIAL

## 2023-10-13 DIAGNOSIS — Z89.612 ACQUIRED ABSENCE OF LEFT LEG ABOVE KNEE (H): Primary | ICD-10-CM

## 2023-10-18 ENCOUNTER — DOCUMENTATION ONLY (OUTPATIENT)
Dept: ORTHOPEDICS | Facility: CLINIC | Age: 63
End: 2023-10-18
Payer: COMMERCIAL

## 2023-10-20 NOTE — PROGRESS NOTES
"S: Schuyler Goodson is a 63 yom seen today in the [place] Office for a LEFT TF casting for a new prosthesis. Pt arrived in clinic and entered the exam room using crutches. He was accompanied by one of his daughters. An in-person Alissa  assisted with the appt.    Dx: Left AKA [Z89.612]    O: 5' 2\", 161 lbs. No changes since last visit. Pt was measured and casted over a size 26 conical Ossur Iceross Transfemoral gel locking liner.  Measurements:  ML: 5 1/2\"  AP: 5\"  circ @ 0: 20\"  circ 2: 18\"  circ 4: 15 1/2\"  circ 6: 14 1/4\"  circ 8: 11 1/2\"  length IT-distal: 10 1/2\"  IT to floor: 27\"  contralateral knee center to floor: 17\"  contralateral foot length: 23 cm    A: Pt was casted in slight hip flexion while standing with a walker. A check socket with lanyard suspension will be fabricated for Schuyler, to be fit in 2 weeks.    P: Pt will return for check socket fitting and delivery in 2 weeks. The following components will be ordered through [employer]: S700 Safety Knee w/ pyramid adapter; Ossur Balance Foot S (23 left, category 3, beige)    G: The goal is to provide the patient with a good fitting, highly functional, comfortable and cosmetically pleasing prostheses.    Electronically signed by Jemima Torrez CPO  "

## 2023-11-01 ENCOUNTER — DOCUMENTATION ONLY (OUTPATIENT)
Dept: ORTHOPEDICS | Facility: CLINIC | Age: 63
End: 2023-11-01
Payer: COMMERCIAL

## 2023-11-02 NOTE — PROGRESS NOTES
"S: Schuyler Goodson is a 63 yom seen today in the [place] Office for a LEFT TF check socket fitting and delivery. Pt arrived in clinic and entered the exam room using a wheel chair. He was accompanied by one of his daughters today. An in-person Alissa  assisted with the appt.  Dx: Left AKA [Z89.612]    O: 5' 2\", 161 lbs. No changes since last visit. Pt forgot to bring a left shoe, so a loaner shoe was used for today's fitting. Pt donned gel liner and was able to independently don the copoly check socket while sitting, utilizing the lanyard suspension strap. Pt stood up with the prosthesis using a walker for balance. Distal contact in the socket was confirmed with playdoh.    A: Static alignment was performed. The prosthesis was too tall, so the pylon was cut by 1/2\". Hips were even after making this adjustment. (Pt was wearing two different shoes, so height may need to be re-evaluated at next appt). Pt reported that socket fit was good. Some gapping along proximal lateral wall. Proximal lateral wall was heated and brought in closer to patient's hip. Confirmed pt's ischium does land on ischial seat.    Dynamic alignment was performed with pt walking using a walker. The patient stated that the socket was comfortable and that the function was good. Pt would like to try the prosthesis for a few weeks and see how things go. After walking, the patient s skin was rechecked, and there were no persistent red marks or any other indications of extreme pressure or skin abrasion.    At the end of the appointment, all screws were treated with [place] and torqued according to 's specifications.    Today the patient's was provided with:  * A test socket that is custom molded and of ischial containment total contact design.  The prosthesis is an endoskeletal, alignable system.  * Socket is suspended by using a lanyard strap attached to the liner that feeds through a chafe attached to the outside of the socket.  * " Two Ossur Iceross Transfmeoral gel locking liners, 26 conical 3mm  * ST&G S700 Safety Knee w/ pyramid adapter - 1324AP (SN: [id number])  * Ossur Balance Foot S K2 Foot, Category 3, Left, Size 23cm, beige foot shell (SN: [id number])  * Six 1-ply Socks (w/ holes) to accommodate volume changes.  * Three 3-ply socks and three 5-ply socks (w/ holes) to accommodate volume changes.    Later in the prosthetic limb process these items will be provided to the patient:  * A definitive external frame with flexible inner socket that is custom molded and of ischial containment total contact design. The socket will be made of acrylic and ultra-light weight materials. The definitive prosthesis will be endoskeletal and alignable.  * At the patient s option, the patient will be provided with a protective cover over the prostheses that is custom shaped to the patient s contralateral limb.    P: Pt was instructed to contact the clinic and discontinue use of the prosthesis if any persistent red marks, skin irritation or breakdown occurs while wearing the prosthesis.    Pt will return for first check socket follow-up in 2 weeks. The patient will continue to follow-up on a bi-weekly basis until there are no significant static, dynamic nor volume changes from week to week (about 4 to 6 weeks). At that time, a definitive socket will be made.    G: The goal is to provide the patient with a good fitting, highly functional, comfortable and cosmetically pleasing prostheses.    Electronically signed by Jemima Torrez CPO

## 2023-11-08 DIAGNOSIS — G54.6 PHANTOM LIMB PAIN (H): ICD-10-CM

## 2023-11-08 DIAGNOSIS — G89.29 CHRONIC PAIN OF LEFT LOWER EXTREMITY: ICD-10-CM

## 2023-11-08 DIAGNOSIS — M79.605 CHRONIC PAIN OF LEFT LOWER EXTREMITY: ICD-10-CM

## 2023-11-08 RX ORDER — GABAPENTIN 300 MG/1
CAPSULE ORAL
Qty: 180 CAPSULE | Refills: 3 | Status: SHIPPED | OUTPATIENT
Start: 2023-11-08 | End: 2024-02-12

## 2023-12-01 DIAGNOSIS — I10 ESSENTIAL HYPERTENSION, BENIGN: ICD-10-CM

## 2023-12-01 DIAGNOSIS — R00.0 SINUS TACHYCARDIA: ICD-10-CM

## 2023-12-04 RX ORDER — PROPRANOLOL HYDROCHLORIDE 80 MG/1
80 CAPSULE, EXTENDED RELEASE ORAL DAILY
Qty: 90 CAPSULE | Refills: 3 | Status: SHIPPED | OUTPATIENT
Start: 2023-12-04 | End: 2024-02-12

## 2024-01-26 ENCOUNTER — TELEPHONE (OUTPATIENT)
Dept: FAMILY MEDICINE | Facility: CLINIC | Age: 64
End: 2024-01-26
Payer: COMMERCIAL

## 2024-01-26 NOTE — TELEPHONE ENCOUNTER
Patient Quality Outreach    Patient is due for the following:   Hypertension -  Hypertension follow-up visit    Next Steps:   Patient has upcoming appointment, these items will be addressed at that time.  Next 5 appointments (look out 90 days)      Feb 12, 2024  1:00 PM  (Arrive by 12:40 PM)  Provider Visit with Wagner Lamar MD  [facility] ([facility] ) [address] [place] [id number] [phone]          Type of outreach:    Phone, spoke to patient/parent.      Questions for provider review:    None           Sheryl Barrett RN

## 2024-02-12 ENCOUNTER — OFFICE VISIT (OUTPATIENT)
Dept: FAMILY MEDICINE | Facility: CLINIC | Age: 64
End: 2024-02-12
Payer: COMMERCIAL

## 2024-02-12 VITALS
SYSTOLIC BLOOD PRESSURE: 109 MMHG | OXYGEN SATURATION: 97 % | HEART RATE: 108 BPM | RESPIRATION RATE: 18 BRPM | TEMPERATURE: 99 F | DIASTOLIC BLOOD PRESSURE: 68 MMHG

## 2024-02-12 DIAGNOSIS — M79.605 CHRONIC PAIN OF LEFT LOWER EXTREMITY: ICD-10-CM

## 2024-02-12 DIAGNOSIS — I1A.0 RESISTANT HYPERTENSION: ICD-10-CM

## 2024-02-12 DIAGNOSIS — R00.0 SINUS TACHYCARDIA: ICD-10-CM

## 2024-02-12 DIAGNOSIS — E11.29 TYPE 2 DIABETES MELLITUS WITH MICROALBUMINURIA, WITHOUT LONG-TERM CURRENT USE OF INSULIN (H): Primary | ICD-10-CM

## 2024-02-12 DIAGNOSIS — S78.112S: ICD-10-CM

## 2024-02-12 DIAGNOSIS — Z23 NEED FOR VACCINATION: ICD-10-CM

## 2024-02-12 DIAGNOSIS — G54.6 PHANTOM LIMB PAIN (H): ICD-10-CM

## 2024-02-12 DIAGNOSIS — G89.29 CHRONIC PAIN OF LEFT LOWER EXTREMITY: ICD-10-CM

## 2024-02-12 DIAGNOSIS — D69.6 THROMBOCYTOPENIA (H): ICD-10-CM

## 2024-02-12 DIAGNOSIS — M25.50 PAIN IN JOINT, MULTIPLE SITES: ICD-10-CM

## 2024-02-12 DIAGNOSIS — R07.89 CHEST WALL PAIN: ICD-10-CM

## 2024-02-12 DIAGNOSIS — Z76.0 ENCOUNTER FOR MEDICATION REFILL: ICD-10-CM

## 2024-02-12 DIAGNOSIS — R80.9 TYPE 2 DIABETES MELLITUS WITH MICROALBUMINURIA, WITHOUT LONG-TERM CURRENT USE OF INSULIN (H): Primary | ICD-10-CM

## 2024-02-12 LAB
ERYTHROCYTE [DISTWIDTH] IN BLOOD BY AUTOMATED COUNT: 15.1 % (ref 10–15)
HBA1C MFR BLD: 7.2 % (ref 0–5.6)
HCT VFR BLD AUTO: 41.7 % (ref 40–53)
HGB BLD-MCNC: 13.6 G/DL (ref 13.3–17.7)
MCH RBC QN AUTO: 24.2 PG (ref 26.5–33)
MCHC RBC AUTO-ENTMCNC: 32.6 G/DL (ref 31.5–36.5)
MCV RBC AUTO: 74 FL (ref 78–100)
PLATELET # BLD AUTO: 172 10E3/UL (ref 150–450)
RBC # BLD AUTO: 5.62 10E6/UL (ref 4.4–5.9)
WBC # BLD AUTO: 8.7 10E3/UL (ref 4–11)

## 2024-02-12 PROCEDURE — 85027 COMPLETE CBC AUTOMATED: CPT | Performed by: FAMILY MEDICINE

## 2024-02-12 PROCEDURE — 83036 HEMOGLOBIN GLYCOSYLATED A1C: CPT | Performed by: FAMILY MEDICINE

## 2024-02-12 PROCEDURE — 99214 OFFICE O/P EST MOD 30 MIN: CPT | Mod: 25 | Performed by: FAMILY MEDICINE

## 2024-02-12 PROCEDURE — 90715 TDAP VACCINE 7 YRS/> IM: CPT | Performed by: FAMILY MEDICINE

## 2024-02-12 PROCEDURE — 90472 IMMUNIZATION ADMIN EACH ADD: CPT | Performed by: FAMILY MEDICINE

## 2024-02-12 PROCEDURE — 36415 COLL VENOUS BLD VENIPUNCTURE: CPT | Performed by: FAMILY MEDICINE

## 2024-02-12 PROCEDURE — 90471 IMMUNIZATION ADMIN: CPT | Performed by: FAMILY MEDICINE

## 2024-02-12 PROCEDURE — 90686 IIV4 VACC NO PRSV 0.5 ML IM: CPT | Performed by: FAMILY MEDICINE

## 2024-02-12 PROCEDURE — 80048 BASIC METABOLIC PNL TOTAL CA: CPT | Performed by: FAMILY MEDICINE

## 2024-02-12 PROCEDURE — 80061 LIPID PANEL: CPT | Performed by: FAMILY MEDICINE

## 2024-02-12 RX ORDER — LOSARTAN POTASSIUM 100 MG/1
100 TABLET ORAL DAILY
Qty: 90 TABLET | Refills: 3 | Status: SHIPPED | OUTPATIENT
Start: 2024-02-12

## 2024-02-12 RX ORDER — AMLODIPINE BESYLATE 10 MG/1
10 TABLET ORAL DAILY
Qty: 90 TABLET | Refills: 3 | Status: SHIPPED | OUTPATIENT
Start: 2024-02-12

## 2024-02-12 RX ORDER — PROPRANOLOL HYDROCHLORIDE 80 MG/1
80 CAPSULE, EXTENDED RELEASE ORAL DAILY
Qty: 90 CAPSULE | Refills: 3 | Status: SHIPPED | OUTPATIENT
Start: 2024-02-12

## 2024-02-12 RX ORDER — ACETAMINOPHEN 500 MG
500 TABLET ORAL EVERY 6 HOURS PRN
Qty: 100 TABLET | Refills: 11 | Status: SHIPPED | OUTPATIENT
Start: 2024-02-12

## 2024-02-12 RX ORDER — GABAPENTIN 300 MG/1
CAPSULE ORAL
Qty: 180 CAPSULE | Refills: 3 | Status: SHIPPED | OUTPATIENT
Start: 2024-02-12 | End: 2024-08-13

## 2024-02-12 NOTE — PROGRESS NOTES
ASSESMENT AND PLAN:  Diagnoses and all orders for this visit:  Type 2 diabetes mellitus with microalbuminuria, without long-term current use of insulin (H)  -     Lipid panel reflex to direct LDL Non-fasting; Future  -     Albumin Random Urine Quantitative with Creat Ratio; Future  -     HEMOGLOBIN A1C; Future  -     metFORMIN (GLUCOPHAGE) 500 MG tablet; Take 1 tablet (500 mg) by mouth 2 times daily (with meals)  Sinus tachycardia  -     propranolol ER (INDERAL LA) 80 MG 24 hr capsule; Take 1 capsule (80 mg) by mouth daily  Resistant hypertension  -     BASIC METABOLIC PANEL; Future  -     amLODIPine (NORVASC) 10 MG tablet; Take 1 tablet (10 mg) by mouth daily  -     losartan (COZAAR) 100 MG tablet; Take 1 tablet (100 mg) by mouth daily  Pain in joint, multiple sites  -     acetaminophen (TYLENOL) 500 MG tablet; Take 1 tablet (500 mg) by mouth every 6 hours as needed  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  His home allows adequate space for maneuvering the wheelchair in the home and he has adequate upper extremity strength to propel the wheelchair and also has family members and caregivers who are willing to assist with propelling the wheelchair when necessary.  I also filled out the disability parking forms for the patient.  Chronic pain of left lower extremity  -     acetaminophen (TYLENOL) 500 MG tablet; Take 1 tablet (500 mg) by mouth every 6 hours as needed  -     gabapentin (NEURONTIN) 300 MG capsule; TAKE 1 CAPSULE BY MOUTH TWICE DAILY  Chest wall pain  -     diclofenac (VOLTAREN) 1 % topical gel; Apply 4 g topically 4 times daily  Phantom limb pain (H)  -     gabapentin (NEURONTIN) 300 MG capsule; TAKE 1 CAPSULE BY MOUTH TWICE DAILY  Thrombocytopenia (H24)  -     CBC with platelets;  Future  Encounter for medication refill  Need for vaccination  -     TDAP 10-64Y (ADACEL,BOOSTRIX)  -     INFLUENZA VACCINE >6 MONTHS (AFLURIA/FLUZONE)      Reviewed the risks and benefits of the treatment plan with the patient and/or caregiver and we discussed indications for routine and emergent follow-up.        SUBJECTIVE: Patient has a history of a traumatic amputation of the left leg above the knee.  He uses a prosthesis but also has multijoint pain and the combination of problems causes him to sometimes need a wheelchair when ambulating across longer distances.  For example, when he is in the clinic he uses a wheelchair to be pushed down the long hallways to the lab etc.  The patient finds is helpful at reducing pain and his family members and caregivers at find it helpful in making it easier to get him places, especially longer distance ambulation.  He has a history of resistant hypertension but his blood pressure has currently been under good control.  He also is a history of sinus tachycardia which has improved with propranolol which she is taking daily.  He does need refills on multiple medications.  He has not been having any issues or problems with his medications recently.    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)    Post-traumatic Stress Disorder    Myalgia And Myositis    Vitamin D Deficiency    Benign Essential Hypertension    Arthralgias In Multiple Sites    Back Pain    Insomnia    Esophageal Reflux    Limb Pain    Elevated liver enzymes    Chronic lower limb pain    Chronic pain of left lower extremity    Intellectual disability    History of closed head injury    Pleural effusion    Sepsis (H)    Hyponatremia    Empyema, right (H)    TY (acute kidney injury) (H24)    Microcytic anemia    Generalized muscle weakness    Anemia due to blood  loss, acute    Right-sided chest pain    Type 2 diabetes mellitus with microalbuminuria, without long-term current use of insulin (H)    Phantom limb pain (H)    Urinary incontinence    Resistant hypertension    Sinus tachycardia    Thrombocytopenia (H24)     Current Outpatient Medications   Medication Sig Dispense Refill    acetaminophen (TYLENOL) 500 MG tablet Take 1 tablet (500 mg) by mouth every 6 hours as needed 100 tablet 11    amLODIPine (NORVASC) 10 MG tablet Take 1 tablet (10 mg) by mouth daily 90 tablet 3    cholecalciferol 50 MCG (2000 UT) CAPS Take 2,000 Units by mouth daily 90 capsule 3    diclofenac (VOLTAREN) 1 % topical gel Apply 4 g topically 4 times daily 350 g 4    gabapentin (NEURONTIN) 300 MG capsule TAKE 1 CAPSULE BY MOUTH TWICE DAILY 180 capsule 3    losartan (COZAAR) 100 MG tablet Take 1 tablet (100 mg) by mouth daily 90 tablet 3    metFORMIN (GLUCOPHAGE) 500 MG tablet Take 1 tablet (500 mg) by mouth 2 times daily (with meals) 180 tablet 0    propranolol ER (INDERAL LA) 80 MG 24 hr capsule Take 1 capsule (80 mg) by mouth daily 90 capsule 3     History   Smoking Status    Former    Types: Cigarettes    Quit date: 1/1/2015   Smokeless Tobacco    Former       OBJECTICE: /68   Pulse 108   Temp 99  F (37.2  C) (Oral)   Resp 18   SpO2 97%      Recent Results (from the past 24 hour(s))   HEMOGLOBIN A1C    Collection Time: 02/12/24  1:04 PM   Result Value Ref Range    Hemoglobin A1C 7.2 (H) 0.0 - 5.6 %   CBC with platelets    Collection Time: 02/12/24  1:04 PM   Result Value Ref Range    WBC Count 8.7 4.0 - 11.0 10e3/uL    RBC Count 5.62 4.40 - 5.90 10e6/uL    Hemoglobin 13.6 13.3 - 17.7 g/dL    Hematocrit 41.7 40.0 - 53.0 %    MCV 74 (L) 78 - 100 fL    MCH 24.2 (L) 26.5 - 33.0 pg    MCHC 32.6 31.5 - 36.5 g/dL    RDW 15.1 (H) 10.0 - 15.0 %    Platelet Count 172 150 - 450 10e3/uL        GEN-alert, appropriate, in no apparent distress   CV-mild sinus tachycardia with no murmur   RESP-lungs  clear to auscultation   EXTREM-left leg status post amputation, right lower leg without any pitting edema of the ankle   Prior to immunization administration, verified patients identity using patient s name and date of birth. Please see Immunization Activity for additional information.     Screening Questionnaire for Adult Immunization    Are you sick today?   No   Do you have allergies to medications, food, a vaccine component or latex?   No   Have you ever had a serious reaction after receiving a vaccination?   No   Do you have a long-term health problem with heart, lung, kidney, or metabolic disease (e.g., diabetes), asthma, a blood disorder, no spleen, complement component deficiency, a cochlear implant, or a spinal fluid leak?  Are you on long-term aspirin therapy?   Yes, diabetes   Do you have cancer, leukemia, HIV/AIDS, or any other immune system problem?   No   Do you have a parent, brother, or sister with an immune system problem?   No   In the past 3 months, have you taken medications that affect  your immune system, such as prednisone, other steroids, or anticancer drugs; drugs for the treatment of rheumatoid arthritis, Crohn s disease, or psoriasis; or have you had radiation treatments?   No   Have you had a seizure, or a brain or other nervous system problem?   No   During the past year, have you received a transfusion of blood or blood    products, or been given immune (gamma) globulin or antiviral drug?   No   For women: Are you pregnant or is there a chance you could become       pregnant during the next month?   No   Have you received any vaccinations in the past 4 weeks?   No     Immunization questionnaire was positive for at least one answer.  Dr. Lamar is aware.      Patient instructed to remain in clinic for 15 minutes afterwards, and to report any adverse reactions.     Screening performed by Ross Figueroa MA on 2/12/2024 at 1:26 PM.         Wagner Lamar MD

## 2024-02-13 LAB
ANION GAP SERPL CALCULATED.3IONS-SCNC: 11 MMOL/L (ref 7–15)
BUN SERPL-MCNC: 10.8 MG/DL (ref 8–23)
CALCIUM SERPL-MCNC: 9.1 MG/DL (ref 8.8–10.2)
CHLORIDE SERPL-SCNC: 101 MMOL/L (ref 98–107)
CHOLEST SERPL-MCNC: 305 MG/DL
CREAT SERPL-MCNC: 0.8 MG/DL (ref 0.67–1.17)
DEPRECATED HCO3 PLAS-SCNC: 25 MMOL/L (ref 22–29)
EGFRCR SERPLBLD CKD-EPI 2021: >90 ML/MIN/1.73M2
FASTING STATUS PATIENT QL REPORTED: NO
GLUCOSE SERPL-MCNC: 119 MG/DL (ref 70–99)
HDLC SERPL-MCNC: 55 MG/DL
LDLC SERPL CALC-MCNC: 216 MG/DL
NONHDLC SERPL-MCNC: 250 MG/DL
POTASSIUM SERPL-SCNC: 4.5 MMOL/L (ref 3.4–5.3)
SODIUM SERPL-SCNC: 137 MMOL/L (ref 135–145)
TRIGL SERPL-MCNC: 172 MG/DL

## 2024-02-27 DIAGNOSIS — R00.0 SINUS TACHYCARDIA: ICD-10-CM

## 2024-02-27 RX ORDER — PROPRANOLOL HYDROCHLORIDE 80 MG/1
80 CAPSULE, EXTENDED RELEASE ORAL DAILY
Qty: 90 CAPSULE | Refills: 3 | OUTPATIENT
Start: 2024-02-27

## 2024-02-27 NOTE — TELEPHONE ENCOUNTER
Pharmacy requested refills that are already active on file. Refused request to pharmacy.   normal...

## 2024-05-11 DIAGNOSIS — E11.29 TYPE 2 DIABETES MELLITUS WITH MICROALBUMINURIA, WITHOUT LONG-TERM CURRENT USE OF INSULIN (H): ICD-10-CM

## 2024-05-11 DIAGNOSIS — R80.9 TYPE 2 DIABETES MELLITUS WITH MICROALBUMINURIA, WITHOUT LONG-TERM CURRENT USE OF INSULIN (H): ICD-10-CM

## 2024-06-01 ENCOUNTER — TRANSFERRED RECORDS (OUTPATIENT)
Dept: MULTI SPECIALTY CLINIC | Facility: CLINIC | Age: 64
End: 2024-06-01

## 2024-06-01 LAB — RETINOPATHY: NORMAL

## 2024-08-13 ENCOUNTER — OFFICE VISIT (OUTPATIENT)
Dept: FAMILY MEDICINE | Facility: CLINIC | Age: 64
End: 2024-08-13
Payer: COMMERCIAL

## 2024-08-13 VITALS
DIASTOLIC BLOOD PRESSURE: 76 MMHG | RESPIRATION RATE: 20 BRPM | SYSTOLIC BLOOD PRESSURE: 132 MMHG | TEMPERATURE: 98.7 F | HEART RATE: 122 BPM | OXYGEN SATURATION: 99 %

## 2024-08-13 DIAGNOSIS — E78.00 PURE HYPERCHOLESTEROLEMIA: ICD-10-CM

## 2024-08-13 DIAGNOSIS — R80.9 TYPE 2 DIABETES MELLITUS WITH MICROALBUMINURIA, WITHOUT LONG-TERM CURRENT USE OF INSULIN (H): Primary | ICD-10-CM

## 2024-08-13 DIAGNOSIS — D69.6 THROMBOCYTOPENIA (H): ICD-10-CM

## 2024-08-13 DIAGNOSIS — G89.29 CHRONIC PAIN OF LEFT LOWER EXTREMITY: ICD-10-CM

## 2024-08-13 DIAGNOSIS — M79.605 CHRONIC PAIN OF LEFT LOWER EXTREMITY: ICD-10-CM

## 2024-08-13 DIAGNOSIS — E11.29 TYPE 2 DIABETES MELLITUS WITH MICROALBUMINURIA, WITHOUT LONG-TERM CURRENT USE OF INSULIN (H): Primary | ICD-10-CM

## 2024-08-13 DIAGNOSIS — G54.6 PHANTOM LIMB PAIN (H): ICD-10-CM

## 2024-08-13 LAB — HBA1C MFR BLD: 6.4 % (ref 0–5.6)

## 2024-08-13 PROCEDURE — G2211 COMPLEX E/M VISIT ADD ON: HCPCS | Performed by: FAMILY MEDICINE

## 2024-08-13 PROCEDURE — 36415 COLL VENOUS BLD VENIPUNCTURE: CPT | Performed by: FAMILY MEDICINE

## 2024-08-13 PROCEDURE — 99214 OFFICE O/P EST MOD 30 MIN: CPT | Performed by: FAMILY MEDICINE

## 2024-08-13 PROCEDURE — 83036 HEMOGLOBIN GLYCOSYLATED A1C: CPT | Performed by: FAMILY MEDICINE

## 2024-08-13 RX ORDER — GABAPENTIN 300 MG/1
CAPSULE ORAL
Qty: 180 CAPSULE | Refills: 3 | Status: SHIPPED | OUTPATIENT
Start: 2024-08-13

## 2024-08-13 RX ORDER — EZETIMIBE 10 MG/1
10 TABLET ORAL DAILY
Qty: 90 TABLET | Refills: 3 | Status: SHIPPED | OUTPATIENT
Start: 2024-08-13

## 2024-08-13 NOTE — PROGRESS NOTES
Assessment & Plan     Type 2 diabetes mellitus with microalbuminuria, without long-term current use of insulin (H)  - HEMOGLOBIN A1C; Future  - metFORMIN (GLUCOPHAGE) 500 MG tablet; Take 1 tablet (500 mg) by mouth 2 times daily (with meals)  - HEMOGLOBIN A1C    Chronic pain of left lower extremity  - gabapentin (NEURONTIN) 300 MG capsule; TAKE 1 CAPSULE BY MOUTH TWICE DAILY    Phantom limb pain (H)  - gabapentin (NEURONTIN) 300 MG capsule; TAKE 1 CAPSULE BY MOUTH TWICE DAILY    Hypercholesterolemia  Patient counseled on most recent lab results and the importance of dietary changes and will also start medication as prescribed below, avoiding a statin given his past history of allergy to simvastatin.  - ezetimibe (ZETIA) 10 MG tablet; Take 1 tablet (10 mg) by mouth daily    Thrombocytopenia (H24)  Resolved, most recent CBC was normal.      We discussed indications for follow-up.  The longitudinal plan of care for the diagnosis(es)/condition(s) as documented were addressed during this visit. Due to the added complexity in care, I will continue to support Schuyler in the subsequent management and with ongoing continuity of care.        Subjective   Schuyler is a 64 year old, presenting for the following health issues:  Diabetes and Hypertension      8/13/2024    12:03 PM   Additional Questions   Roomed by Donald SEGOVIA   Accompanied by daughter     History of Present Illness       Diabetes:   He presents for follow up of diabetes.    He is not checking blood glucose.         He has no concerns regarding his diabetes at this time.   He is not experiencing numbness or burning in feet, excessive thirst, blurry vision, weight changes or redness, sores or blisters on feet. The patient has had a diabetic eye exam in the last 12 months. Eye exam performed on 6/2024. Location of last eye exam  [facility] clinic.        Hypertension: He presents for follow up of hypertension.  He does not check blood pressure  regularly outside of the clinic.  Outpatient blood pressures have not been over 140/90. He follows a low salt diet.     He eats 2-3 servings of fruits and vegetables daily.He consumes 1 sweetened beverage(s) daily.He exercises with enough effort to increase his heart rate 10 to 19 minutes per day.  He exercises with enough effort to increase his heart rate 5 days per week.   He is taking medications regularly.           Objective    /76   Pulse (!) 122   Temp 98.7  F (37.1  C) (Oral)   Resp 20   SpO2 99%   There is no height or weight on file to calculate BMI.  Physical Exam   Cardiac-regular rate and rhythm with no murmur  Respiratory-lungs clear to auscultation  Extremities-status post left leg amputation        Signed Electronically by: Wagner Lamar MD

## 2024-09-10 ENCOUNTER — PATIENT OUTREACH (OUTPATIENT)
Dept: GERIATRIC MEDICINE | Facility: CLINIC | Age: 64
End: 2024-09-10
Payer: COMMERCIAL

## 2024-09-10 NOTE — LETTER
September 10, 2024    SCHUYLER SEGOVIA PANCHITO  [address] [place] [address]    Dear  Schuyler,    Welcome to [facility] s MSC+ health program. My name is Aleena Nicole RN. I am your MSC+ care coordinator. You are eligible for Care Coordination through [facility] MSC+ plan.    As your care coordinator, we ll:  Meet to go over your care coordination benefits  Talk about your physical and mental health care needs   Review your preventative care needs  Create a plan that meets your needs with the services you choose    What happens next?  I ll call you soon to introduce myself and tell you more about my role. We ll then plan time to go over your health and safety needs. Our goal is to keep you as healthy and independent as possible.    Soon, you will receive a new MSC+ member identification (ID) card from [facility]. When you receive it, please use this card along with your [place] [facility] Programs card and Prescription Drug Coverage Program card. When you receive, it please use this card where you get your health services. If you have Medicare, you will need to show your Medicare card when you get health services.    The [facility] care coordination program is voluntary and offered to you at no cost. If you wish to stop being in the care coordination program or have questions, call me at [phone]. If you reach my voicemail, leave a message and your phone number. TTY users, call the [place] Relay at 855 or 1-821.308.3517 (ilukuz-xg-osakxe relay service).    Sincerely,      Aleena Nicole RN  [phone]  [email or web address]    [id number] accepted   [id number]       [id number] (07/2022)

## 2024-09-10 NOTE — PROGRESS NOTES
[facility] Care Coordination Contact    Member became effective with  [facility] on 09/01/24 with [facility]  Previous Health Plan: [facility]  Previous Care System: [facility]  Previous care coordinators name and number: ARTURO LANZA SUNILTEN  Alma Rosa Type: N/A  UTF received: No: Requested on 09/10/24 from [facility]:  [email or web address]   Mailed welcome letter and [facility] When to Contact Your Care Coordinator  Address/Phone discrepancy: none  [place] and fully prepped for visit as [facility].    Jake Dowd  Care Management Specialist  [facility]  [phone]

## 2024-09-25 ENCOUNTER — PATIENT OUTREACH (OUTPATIENT)
Dept: GERIATRIC MEDICINE | Facility: CLINIC | Age: 64
End: 2024-09-25
Payer: COMMERCIAL

## 2024-09-25 NOTE — PROGRESS NOTES
[facility] Care Coordination Contact    Late entry: 9/24/24    Called member and family Deniz Hawkins  to schedule annual HRA home visit. HRA has been scheduled for 9/25/24.    Aleena Nicole RN, PHN  [facility]  [phone]

## 2024-10-01 NOTE — PROGRESS NOTES
[facility] Care Coordination Contact    [facility] Initial Assessment     Home visit for Initial Health Risk Assessment with Schuyler Goodson completed on September 25, 2024    Type of residence:: Private home - stairs  Current living arrangement:: I live in a private home with family     Assessment completed with:: Children, Patient    Current Care Plan  Member currently receiving the following home care services:     Member currently receiving the following community resources: PCA      Medication Review  Medication reconciliation completed in Epic: Yes  Medication set-up & administration: Family/informal caregiver sets up weekly.  Family caregiver administers medications.  Medication Risk Assessment Medication (1 or more, place referral to MTM): N/A: No risk factors identified  MTM Referral Placed: No: No risk factors idenified    Mental/Behavioral Health   Depression Screening:           Mental health DX:: Yes        No current MH services-will place referral for  declined    Falls Assessment:   Fallen 2 or more times in the past year?: No   Any fall with injury in the past year?: No    ADL/IADL Dependencies:   Dependent ADLs:: Bathing, Dressing, Grooming, Incontinence, Positioning, Transfers, Toileting, Ambulation-walker  Dependent IADLs:: Cleaning, Cooking, Laundry, Shopping, Meal Preparation, Medication Management, Money Management, Transportation    Health Plan sponsored benefits: [employer] MSC+: Shared information regarding preventative health screening and health plan supplemental benefits/incentives. Reviewed medication disposal form.    PCA Assessment completed at visit: Yes Annual PCA assessment indicated 5.45 hours per day of PCA. This is an increase from the previous assessment.     Elderly Waiver Eligibility: Yes, but member declines EW services; will not open to EW    Care Plan & Recommendations:     [place] detailed [place] information.    Follow-Up Plan: Member  informed of future contact, plan to f/u with member with a 6 month telephone assessment.  Contact information shared with member and family, encouraged member to call with any questions or concerns at any time.    [facility] care continuum providers: Please see Snapshot and Care Management Flowsheets for Specific details of care plan.    This CC note routed to PCP, Wagner Lamar RN, PHN  [facility]  [phone]

## 2024-10-04 ENCOUNTER — PATIENT OUTREACH (OUTPATIENT)
Dept: GERIATRIC MEDICINE | Facility: CLINIC | Age: 64
End: 2024-10-04
Payer: COMMERCIAL

## 2024-10-23 ENCOUNTER — TELEPHONE (OUTPATIENT)
Dept: GERIATRIC MEDICINE | Facility: CLINIC | Age: 64
End: 2024-10-23
Payer: COMMERCIAL

## 2024-10-23 DIAGNOSIS — S78.112S: Primary | ICD-10-CM

## 2024-10-23 NOTE — TELEPHONE ENCOUNTER
I am a care coordinator for [facility].  We contract with [facility] to provide care coordination for .  I completed their annual assessment recently.   stated a need for a wheelchair.  He reports that was previously order but never received.  I have submitted this order request for your approval and signature.  This item is covered by member's insurance.     If you approve this order, please complete, sign, and route back to me.  I will complete the order process through [facility].

## 2024-10-25 ENCOUNTER — TELEPHONE (OUTPATIENT)
Dept: FAMILY MEDICINE | Facility: CLINIC | Age: 64
End: 2024-10-25
Payer: COMMERCIAL

## 2024-10-25 ENCOUNTER — PATIENT OUTREACH (OUTPATIENT)
Dept: GERIATRIC MEDICINE | Facility: CLINIC | Age: 64
End: 2024-10-25
Payer: COMMERCIAL

## 2024-10-25 NOTE — LETTER
October 25, 2024       SCHUYLER SEGOVIA PANCHITO  [address] [place] [address]      Dear Schuyler,    At [facility], we re dedicated to improving your health and wellness. Enclosed is the Support Plan developed with you on 09/25/24. Please review the Support Plan carefully.    As a reminder, during your visit we talked about:   Ways to manage your physical and mental health   Using health care to maintain and improve your health    Your preventive care needs      Remember to contact your care coordinator if you:   Are hospitalized or plan to be hospitalized    Have a fall     Have a change in your physical or mental health   Need help finding support or services    If you have questions or don t agree with your Support Plan, call me at [phone]. You can also call me if your needs change. TTY users call the [place] Relay at 351 or 1-122.405.1499 (xkbvmj-wv-pywgqa relay service).    Sincerely,       Aleena Nicole RN  [phone]  [email or web address]                [id number] accepted     (06/2024)                [address] [place] [address]  [phone]  fax [phone]  [facility]

## 2024-10-25 NOTE — LETTER
October 25, 2024    Schuyler SEGOVIA Hamzah  [address] [place] [address]      Schuyler SEGOVIA Hamzah,    I am following up on our conversation regarding your interest regarding Advance Care Planning and Health Care Directives.     When it comes to decisions about your health, it's important that your health care goals, values and wishes are known. In the event of a sudden injury or illness, you may not be able to communicate your choices. We encourage you to begin by thinking about what matters most to you. Have conversations with family, friends, cultural and/or valeriy leaders, and your health care team. Then, share your goals and wishes for current and future healthcare so your voice is heard when treatment decisions need to be made. See the resources listed below for help with talking about and sharing what is most important to you.    We value the opportunity to assist you in documenting your choices and to honor your wishes. We have enclosed a blank Health Care Directive for you if you wish to create a new document. The following resources may be helpful in updating your document:  Documents and information can be viewed and printed at: [email or web address]   [facility]orenzo for a free class on creating Health Care Directives at:  [email or web address]   Email or call me at the contact information listed below for questions, assistance, or to make an appointment to discuss creating a Health Care Directive. You can also contact our [employer] Department for questions or assistance.    To add your Health Care Directive to your medical record:  Drop off a COPY at any of our clinical locations  Email to: [email or web address]   Upload in MyChart (Advance Care Planning section)        Aleena Nicole RN  [phone]  [email or web address]

## 2024-10-25 NOTE — PROGRESS NOTES
[facility] Care Coordination Contact    Received after visit chart from care coordinator.  Completed following tasks: Mailed copy of support plan to member, Mailed [place] Cho[place] signature sheet pages 3-4, Mailed Safe Medication Disposal , Uploaded consent to communicate form(s) to Epic, and Updated services in Database.   and Provider Signature - No Support Plan Shared:  Member indicates that they do not want their support plan shared with any EW providers.    Mailed health care directive documents.  - HCD Standard & Goals Worksheet (short version)    Jake Dowd  Care Management Specialist  [facility]  [phone]

## 2024-10-25 NOTE — TELEPHONE ENCOUNTER
Forms/Letter Request    Type of form/letter: DME     Type of DME requested: Manual Wheelchair    Do we have the form/letter: Yes: Incoming right fax bin for Dr. CARTER    Who is the form from? [employer] (if other please explain)    Where did/will the form come from? form was faxed in    When is form/letter needed by: asap    How would you like the form/letter returned: Fax : [phone]

## 2024-10-30 ENCOUNTER — MEDICAL CORRESPONDENCE (OUTPATIENT)
Dept: HEALTH INFORMATION MANAGEMENT | Facility: CLINIC | Age: 64
End: 2024-10-30
Payer: COMMERCIAL

## 2024-10-30 PROBLEM — S88.911A: Status: ACTIVE | Noted: 2024-10-30

## 2024-11-12 ENCOUNTER — TELEPHONE (OUTPATIENT)
Dept: FAMILY MEDICINE | Facility: CLINIC | Age: 64
End: 2024-11-12
Payer: COMMERCIAL

## 2024-11-12 NOTE — TELEPHONE ENCOUNTER
Forms/Letter Request    Type of form/letter: DME (wheelchair, hospital bed)    Type of DME requested: Wheelchair back cushion      Do we have the form/letter: Yes: in blue folder    Who is the form from? [employer] (if other please explain)    Where did/will the form come from? form was faxed in    When is form/letter needed by: asap    How would you like the form/letter returned: Fax : [phone]    Patient Notified form requests are processed in 5-7 business days:Yes    Okay to leave a detailed message?: Yes at Other phone number:  [phone]*

## 2024-11-12 NOTE — TELEPHONE ENCOUNTER
Duplicate order sent in today      CMA collected or printed forms from . Forms pre-filled for provider review, completion, and signature. Forms placed in provider's blue folder at  today. Thanks.

## 2024-11-13 ENCOUNTER — TELEPHONE (OUTPATIENT)
Dept: FAMILY MEDICINE | Facility: CLINIC | Age: 64
End: 2024-11-13
Payer: COMMERCIAL

## 2024-11-13 NOTE — TELEPHONE ENCOUNTER
Forms/Letter Request    Type of form/letter: DME (wheelchair, hospital bed)  Type of DME requested: [employer] 20/PK PREVAIL    Do we have the form/letter: Yes: incoming fax bin for     Who is the form from? [employer] (if other please explain)    Where did/will the form come from? form was faxed in    When is form/letter needed by: ASAP    How would you like the form/letter returned: Fax : [phone]

## 2024-11-25 ENCOUNTER — PATIENT OUTREACH (OUTPATIENT)
Dept: GERIATRIC MEDICINE | Facility: CLINIC | Age: 64
End: 2024-11-25
Payer: COMMERCIAL

## 2024-11-26 NOTE — PROGRESS NOTES
[facility] Care Coordination Contact    DME (wheelchair) ordered 10/24/2024 more than 30 days due to member's insurance inactive, CC okay to removed from order spreadsheet. CC will place order again once insurance is actives.     Jake Dowd  Care Management Specialist  [facility]  [phone]

## 2024-12-02 ENCOUNTER — MEDICAL CORRESPONDENCE (OUTPATIENT)
Dept: HEALTH INFORMATION MANAGEMENT | Facility: CLINIC | Age: 64
End: 2024-12-02
Payer: COMMERCIAL

## 2025-01-13 DIAGNOSIS — E55.9 VITAMIN D DEFICIENCY: ICD-10-CM

## 2025-01-14 RX ORDER — ACETAMINOPHEN 160 MG
1 TABLET,DISINTEGRATING ORAL DAILY
Qty: 90 CAPSULE | Refills: 2 | Status: SHIPPED | OUTPATIENT
Start: 2025-01-14

## 2025-02-17 ENCOUNTER — OFFICE VISIT (OUTPATIENT)
Dept: FAMILY MEDICINE | Facility: CLINIC | Age: 65
End: 2025-02-17

## 2025-02-17 VITALS
SYSTOLIC BLOOD PRESSURE: 144 MMHG | HEART RATE: 127 BPM | OXYGEN SATURATION: 99 % | DIASTOLIC BLOOD PRESSURE: 89 MMHG | RESPIRATION RATE: 16 BRPM

## 2025-02-17 DIAGNOSIS — R00.0 SINUS TACHYCARDIA: Primary | ICD-10-CM

## 2025-02-17 DIAGNOSIS — Z76.0 ENCOUNTER FOR MEDICATION REFILL: ICD-10-CM

## 2025-02-17 LAB
HOLD SPECIMEN: NORMAL

## 2025-02-17 PROCEDURE — 99212 OFFICE O/P EST SF 10 MIN: CPT | Performed by: FAMILY MEDICINE

## 2025-02-17 RX ORDER — ACETAMINOPHEN 500 MG
500 TABLET ORAL EVERY 6 HOURS PRN
Qty: 100 TABLET | Refills: 11 | Status: SHIPPED | OUTPATIENT
Start: 2025-02-17

## 2025-02-17 RX ORDER — LOSARTAN POTASSIUM 100 MG/1
100 TABLET ORAL DAILY
Qty: 90 TABLET | Refills: 3 | Status: SHIPPED | OUTPATIENT
Start: 2025-02-17

## 2025-02-17 RX ORDER — EZETIMIBE 10 MG/1
10 TABLET ORAL DAILY
Qty: 90 TABLET | Refills: 3 | Status: SHIPPED | OUTPATIENT
Start: 2025-02-17

## 2025-02-17 RX ORDER — GABAPENTIN 300 MG/1
CAPSULE ORAL
Qty: 180 CAPSULE | Refills: 3 | Status: SHIPPED | OUTPATIENT
Start: 2025-02-17

## 2025-02-17 RX ORDER — PROPRANOLOL HYDROCHLORIDE 80 MG/1
80 CAPSULE, EXTENDED RELEASE ORAL DAILY
Qty: 90 CAPSULE | Refills: 3 | Status: SHIPPED | OUTPATIENT
Start: 2025-02-17

## 2025-02-17 RX ORDER — ACETAMINOPHEN 160 MG
1 TABLET,DISINTEGRATING ORAL DAILY
Qty: 90 CAPSULE | Refills: 2 | Status: SHIPPED | OUTPATIENT
Start: 2025-02-17

## 2025-02-17 RX ORDER — AMLODIPINE BESYLATE 10 MG/1
10 TABLET ORAL DAILY
Qty: 90 TABLET | Refills: 3 | Status: SHIPPED | OUTPATIENT
Start: 2025-02-17

## 2025-02-17 NOTE — PROGRESS NOTES
ASSESMENT AND PLAN:  Diagnoses and all orders for this visit:  Sinus tachycardia  Patient has ran out of most of his medication.  Currently uninsured as detailed below.  We discussed options and the family is going to prioritize propranolol with Using cash pay if needed to have him continue on propranolol during his gap in insurance and then restart all of his other chronic medications as soon as the insurance is active.  -     propranolol ER (INDERAL LA) 80 MG 24 hr capsule; Take 1 capsule (80 mg) by mouth daily.    Currently uninsured  Patient's family reports that their application has been completed with the [facility] and the patient is currently MA pending.  See additional plan above.  Per patient and family request we are deferring additional lab workup etc. until insurance is active.    Reviewed the risks and benefits of the treatment plan with the patient and/or caregiver and we discussed indications for routine and emergent follow-up.  12 minutes spent on the date of the encounter doing chart review, patient visit and documentation and face to face counseling on above.        SUBJECTIVE: Patient has been getting mild palpitations.  Otherwise he reports that he has been feeling well.  He has run out of most of his medications since his insurance  and his family has helped him reapply for health insurance and tells me that everything is currently and that the medical assistance is currently pending.    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)    Post-traumatic Stress Disorder    Myalgia And Myositis    Vitamin D Deficiency    Benign Essential Hypertension    Arthralgias In Multiple Sites    Back Pain    Insomnia    Esophageal Reflux    Limb Pain    Elevated liver enzymes    Chronic lower limb pain    Chronic pain of left lower extremity     Intellectual disability    History of closed head injury    Pleural effusion    Sepsis (H)    Hyponatremia    Empyema, right (H)    TY (acute kidney injury)    Microcytic anemia    Generalized muscle weakness    Anemia due to blood loss, acute    Right-sided chest pain    Type 2 diabetes mellitus with microalbuminuria, without long-term current use of insulin (H)    Phantom limb pain (H)    Urinary incontinence    Resistant hypertension    Sinus tachycardia    Thrombocytopenia    Traumatic amputation of right leg (H)     Current Outpatient Medications   Medication Sig Dispense Refill    acetaminophen (TYLENOL) 500 MG tablet Take 1 tablet (500 mg) by mouth every 6 hours as needed for pain. 100 tablet 11    amLODIPine (NORVASC) 10 MG tablet Take 1 tablet (10 mg) by mouth daily. 90 tablet 3    Cholecalciferol (VITAMIN D3) 50 MCG (2000 UT) CAPS Take 1 capsule by mouth daily. 90 capsule 2    diclofenac (VOLTAREN) 1 % topical gel Apply 4 g topically 4 times daily. 350 g 4    ezetimibe (ZETIA) 10 MG tablet Take 1 tablet (10 mg) by mouth daily. 90 tablet 3    gabapentin (NEURONTIN) 300 MG capsule TAKE 1 CAPSULE BY MOUTH TWICE DAILY 180 capsule 3    losartan (COZAAR) 100 MG tablet Take 1 tablet (100 mg) by mouth daily. 90 tablet 3    metFORMIN (GLUCOPHAGE) 500 MG tablet Take 1 tablet (500 mg) by mouth 2 times daily (with meals). 180 tablet 3    propranolol ER (INDERAL LA) 80 MG 24 hr capsule Take 1 capsule (80 mg) by mouth daily. 90 capsule 3         OBJECTICE: BP (!) 144/89 (BP Location: Left arm, Patient Position: Sitting, Cuff Size: Adult Regular)   Pulse (!) 127   Resp 16   SpO2 99%         CV-regular tachycardia with no murmur   RESP-lungs clear to auscultation          Signed Electronically by: Wagner Lamar MD

## 2025-02-20 ENCOUNTER — PATIENT OUTREACH (OUTPATIENT)
Dept: GERIATRIC MEDICINE | Facility: CLINIC | Age: 65
End: 2025-02-20

## 2025-02-20 NOTE — PROGRESS NOTES
[facility] Care Coordination Contact    No longer active with [facility] community case management effective 10/31/24.  Reason for community disenrollment: MA Suly Nicole RN, PHN  [facility]  [phone]

## 2025-03-11 ENCOUNTER — TELEPHONE (OUTPATIENT)
Dept: FAMILY MEDICINE | Facility: CLINIC | Age: 65
End: 2025-03-11

## 2025-03-11 NOTE — TELEPHONE ENCOUNTER
Patient Quality Outreach    Patient is due for the following:   Diabetes -  A1C and Microalbumin  Colon Cancer Screening  Physical Annual Wellness Visit      Topic Date Due    Zoster (Shingles) Vaccine (1 of 2) Never done    Flu Vaccine (1) 09/01/2024    COVID-19 Vaccine (1 - 2024-25 season) Never done     Lung cancer screening  Aortic Aneurysm screening  Diabetic foot exam   BMP, Lipid    Action(s) Taken:   Patient has upcoming appointment, these items will be addressed at that time.    Type of outreach:    Chart review performed, no outreach needed.    Questions for provider review:    None           Ted Schultz MA  Chart routed to Care Team.

## 2025-04-16 ENCOUNTER — PATIENT OUTREACH (OUTPATIENT)
Dept: GERIATRIC MEDICINE | Facility: CLINIC | Age: 65
End: 2025-04-16

## 2025-04-16 NOTE — PROGRESS NOTES
[facility] Care Coordination Contact    Called adult daughter    to schedule annual HRA home visit. HRA has been scheduled for 04/21 @ 12 pm.    Cristine Gill RN  [facility]  [phone]

## 2025-04-21 ENCOUNTER — PATIENT OUTREACH (OUTPATIENT)
Dept: GERIATRIC MEDICINE | Facility: CLINIC | Age: 65
End: 2025-04-21

## 2025-04-21 DIAGNOSIS — R32 URINARY INCONTINENCE, UNSPECIFIED TYPE: ICD-10-CM

## 2025-04-21 DIAGNOSIS — F79 INTELLECTUAL DISABILITY: Primary | ICD-10-CM

## 2025-04-21 ASSESSMENT — ACTIVITIES OF DAILY LIVING (ADL)
DEPENDENT_IADLS:: CLEANING;COOKING;LAUNDRY;SHOPPING;MEAL PREPARATION;MEDICATION MANAGEMENT;MONEY MANAGEMENT;TRANSPORTATION;INCONTINENCE

## 2025-04-21 NOTE — PROGRESS NOTES
[facility] Care Coordination Contact    [facility] Home Visit Assessment     Home visit for Health Risk Assessment with Schuyler Goodson completed on April 21, 2025.    Type of residence: Private home - stairs  Current living arrangement: I live in a private home with family     Assessment completed with: Children, Patient,      Current Care Plan  Member currently receiving the following community resources: PCA    Medication Review  Medication reconciliation completed in Epic: Yes  Medication set-up & administration: Family/informal caregiver sets up daily.  Family caregiver administers medications.  Medication Risk Assessment Medication (1 or more, place referral to MTM): N/A: No risk factors identified  MTM Referral Placed: No: No risk factors idenified    Mental/Behavioral Health   Depression Screening:           Mental health DX:: Yes        Falls Assessment:       Any fall with injury in the past year?: No    ADL/IADL Dependencies:   Dependent ADLs:: Bathing, Dressing, Grooming, Incontinence, Positioning, Transfers, Toileting, Ambulation-walker  Dependent IADLs:: Cleaning, Cooking, Laundry, Shopping, Meal Preparation, Medication Management, Money Management, Transportation, Incontinence    Health Plan sponsored benefits: [facility]: Shared information regarding [employer]. Reviewed preventative health screening and health plan supplemental benefits/incentives. Reviewed medication disposal form and transition of care member handout.    CFSS Assessment completed at visit: Yes Annual CFSS assessment indicated 9.5 hours per day of CFSS. This is an increase from the previous assessment.     Elderly Waiver Eligibility: No-does not meet criteria    Care Plan & Recommendations: Conitnue with support and services. Was off MA for months. Needs to restart incontinent supplies.     [place].    Follow-Up Plan: Member informed of future  contact, plan to f/u with member with a 6 month telephone assessment.  Contact information shared with member and family, encouraged member to call with any questions or concerns at any time.    [place] care continuum providers: Please see Snapshot and Care Management Flowsheets for Specific details of care plan.    This CC note routed to PCP, Wagner Lamar RN  [facility]  [phone]

## 2025-04-29 ENCOUNTER — PATIENT OUTREACH (OUTPATIENT)
Dept: GERIATRIC MEDICINE | Facility: CLINIC | Age: 65
End: 2025-04-29
Payer: COMMERCIAL

## 2025-04-29 NOTE — PROGRESS NOTES
[facility] [place] Care Coordination Contact    APA states pull ups, chux and gloves will be added to Schuyler's monthly order.    Cristine Gill RN  [facility]  [phone]

## 2025-04-30 ENCOUNTER — OFFICE VISIT (OUTPATIENT)
Dept: FAMILY MEDICINE | Facility: CLINIC | Age: 65
End: 2025-04-30
Payer: COMMERCIAL

## 2025-04-30 VITALS
TEMPERATURE: 97.8 F | BODY MASS INDEX: 28.72 KG/M2 | HEIGHT: 62 IN | OXYGEN SATURATION: 98 % | SYSTOLIC BLOOD PRESSURE: 130 MMHG | WEIGHT: 156.04 LBS | HEART RATE: 120 BPM | RESPIRATION RATE: 16 BRPM | DIASTOLIC BLOOD PRESSURE: 81 MMHG

## 2025-04-30 DIAGNOSIS — I1A.0 RESISTANT HYPERTENSION: Primary | ICD-10-CM

## 2025-04-30 DIAGNOSIS — R00.0 SINUS TACHYCARDIA: ICD-10-CM

## 2025-04-30 DIAGNOSIS — Z76.0 ENCOUNTER FOR MEDICATION REFILL: ICD-10-CM

## 2025-04-30 DIAGNOSIS — R80.9 TYPE 2 DIABETES MELLITUS WITH MICROALBUMINURIA, WITHOUT LONG-TERM CURRENT USE OF INSULIN (H): ICD-10-CM

## 2025-04-30 DIAGNOSIS — Z89.612 S/P AKA (ABOVE KNEE AMPUTATION) UNILATERAL, LEFT (H): ICD-10-CM

## 2025-04-30 DIAGNOSIS — E11.29 TYPE 2 DIABETES MELLITUS WITH MICROALBUMINURIA, WITHOUT LONG-TERM CURRENT USE OF INSULIN (H): ICD-10-CM

## 2025-04-30 DIAGNOSIS — E78.00 PURE HYPERCHOLESTEROLEMIA: ICD-10-CM

## 2025-04-30 DIAGNOSIS — G89.29 CHRONIC PAIN OF LEFT LOWER EXTREMITY: ICD-10-CM

## 2025-04-30 DIAGNOSIS — M79.605 CHRONIC PAIN OF LEFT LOWER EXTREMITY: ICD-10-CM

## 2025-04-30 LAB
ALBUMIN SERPL BCG-MCNC: 4.5 G/DL (ref 3.5–5.2)
ALP SERPL-CCNC: 92 U/L (ref 40–150)
ALT SERPL W P-5'-P-CCNC: 28 U/L (ref 0–70)
ANION GAP SERPL CALCULATED.3IONS-SCNC: 10 MMOL/L (ref 7–15)
AST SERPL W P-5'-P-CCNC: 34 U/L (ref 0–45)
BILIRUB SERPL-MCNC: 0.7 MG/DL
BUN SERPL-MCNC: 15.2 MG/DL (ref 8–23)
CALCIUM SERPL-MCNC: 9.9 MG/DL (ref 8.8–10.4)
CHLORIDE SERPL-SCNC: 100 MMOL/L (ref 98–107)
CREAT SERPL-MCNC: 0.88 MG/DL (ref 0.67–1.17)
CREAT UR-MCNC: 83.2 MG/DL
EGFRCR SERPLBLD CKD-EPI 2021: >90 ML/MIN/1.73M2
EST. AVERAGE GLUCOSE BLD GHB EST-MCNC: 146 MG/DL
GLUCOSE SERPL-MCNC: 111 MG/DL (ref 70–99)
HBA1C MFR BLD: 6.7 % (ref 0–5.6)
HCO3 SERPL-SCNC: 26 MMOL/L (ref 22–29)
MICROALBUMIN UR-MCNC: 107 MG/L
MICROALBUMIN/CREAT UR: 128.61 MG/G CR (ref 0–17)
POTASSIUM SERPL-SCNC: 5.1 MMOL/L (ref 3.4–5.3)
PROT SERPL-MCNC: 8.1 G/DL (ref 6.4–8.3)
SODIUM SERPL-SCNC: 136 MMOL/L (ref 135–145)

## 2025-04-30 PROCEDURE — 82570 ASSAY OF URINE CREATININE: CPT | Performed by: FAMILY MEDICINE

## 2025-04-30 PROCEDURE — 80053 COMPREHEN METABOLIC PANEL: CPT | Performed by: FAMILY MEDICINE

## 2025-04-30 PROCEDURE — 3079F DIAST BP 80-89 MM HG: CPT | Performed by: FAMILY MEDICINE

## 2025-04-30 PROCEDURE — 3075F SYST BP GE 130 - 139MM HG: CPT | Performed by: FAMILY MEDICINE

## 2025-04-30 PROCEDURE — G2211 COMPLEX E/M VISIT ADD ON: HCPCS | Performed by: FAMILY MEDICINE

## 2025-04-30 PROCEDURE — 82043 UR ALBUMIN QUANTITATIVE: CPT | Performed by: FAMILY MEDICINE

## 2025-04-30 PROCEDURE — 36415 COLL VENOUS BLD VENIPUNCTURE: CPT | Performed by: FAMILY MEDICINE

## 2025-04-30 PROCEDURE — 83036 HEMOGLOBIN GLYCOSYLATED A1C: CPT | Performed by: FAMILY MEDICINE

## 2025-04-30 PROCEDURE — 99214 OFFICE O/P EST MOD 30 MIN: CPT | Performed by: FAMILY MEDICINE

## 2025-04-30 RX ORDER — ACETAMINOPHEN 500 MG
500 TABLET ORAL EVERY 6 HOURS PRN
Qty: 100 TABLET | Refills: 11 | Status: SHIPPED | OUTPATIENT
Start: 2025-04-30

## 2025-04-30 RX ORDER — EZETIMIBE 10 MG/1
10 TABLET ORAL DAILY
Qty: 90 TABLET | Refills: 3 | Status: SHIPPED | OUTPATIENT
Start: 2025-04-30

## 2025-04-30 RX ORDER — ACETAMINOPHEN 160 MG
1 TABLET,DISINTEGRATING ORAL DAILY
Qty: 90 CAPSULE | Refills: 3 | Status: SHIPPED | OUTPATIENT
Start: 2025-04-30

## 2025-04-30 RX ORDER — GABAPENTIN 300 MG/1
CAPSULE ORAL
Qty: 180 CAPSULE | Refills: 3 | Status: SHIPPED | OUTPATIENT
Start: 2025-04-30

## 2025-04-30 RX ORDER — LOSARTAN POTASSIUM 100 MG/1
100 TABLET ORAL DAILY
Qty: 90 TABLET | Refills: 3 | Status: SHIPPED | OUTPATIENT
Start: 2025-04-30

## 2025-04-30 RX ORDER — PROPRANOLOL HYDROCHLORIDE 80 MG/1
80 CAPSULE, EXTENDED RELEASE ORAL DAILY
Qty: 90 CAPSULE | Refills: 3 | Status: SHIPPED | OUTPATIENT
Start: 2025-04-30

## 2025-04-30 RX ORDER — AMLODIPINE BESYLATE 10 MG/1
10 TABLET ORAL DAILY
Qty: 90 TABLET | Refills: 3 | Status: SHIPPED | OUTPATIENT
Start: 2025-04-30

## 2025-04-30 NOTE — PROGRESS NOTES
ASSESMENT AND PLAN:  Diagnoses and all orders for this visit:  Resistant hypertension  -     losartan (COZAAR) 100 MG tablet; Take 1 tablet (100 mg) by mouth daily.  -     amLODIPine (NORVASC) 10 MG tablet; Take 1 tablet (10 mg) by mouth daily.  -     Comprehensive metabolic panel (BMP + Alb, Alk Phos, ALT, AST, Total. Bili, TP); Future  Sinus tachycardia  -     propranolol ER (INDERAL LA) 80 MG 24 hr capsule; Take 1 capsule (80 mg) by mouth daily.  Type 2 diabetes mellitus with microalbuminuria, without long-term current use of insulin (H)  -     metFORMIN (GLUCOPHAGE) 500 MG tablet; Take 1 tablet (500 mg) by mouth 2 times daily (with meals).  -     Albumin Random Urine Quantitative with Creat Ratio; Future  -     Comprehensive metabolic panel (BMP + Alb, Alk Phos, ALT, AST, Total. Bili, TP); Future  -     Hemoglobin A1c; Future  Encounter for medication refill  -     Cholecalciferol (VITAMIN D3) 50 MCG (2000 UT) CAPS; Take 1 capsule by mouth daily.  S/P AKA (above knee amputation) unilateral, left (H) with Chronic pain of left lower extremity  -     gabapentin (NEURONTIN) 300 MG capsule; TAKE 1 CAPSULE BY MOUTH TWICE DAILY  -     diclofenac (VOLTAREN) 1 % topical gel; Apply 4 g topically 4 times daily.  -     acetaminophen (TYLENOL) 500 MG tablet; Take 1 tablet (500 mg) by mouth every 6 hours as needed for pain.  Pure hypercholesterolemia  -     ezetimibe (ZETIA) 10 MG tablet; Take 1 tablet (10 mg) by mouth daily.      Reviewed the risks and benefits of the treatment plan with the patient and/or caregiver and we discussed indications for routine and emergent follow-up.  The longitudinal plan of care for the diagnosis(es)/condition(s) as documented were addressed during this visit. Due to the added complexity in care, I will continue to support Schuyler in the subsequent management and with ongoing continuity of care.        SUBJECTIVE: Patient in for follow-up on his chronic conditions.  Earlier this year there was  disruption in his care related to loss of health insurance but he has now reestablished health insurance.  Since going back on propranolol he has noticed improvement in his palpitations.  Blood pressure today is looking much better than it was previously.  His family had been pain For his blood pressure medications, patient would like to get back on all of his usual medications, especially his pain medications because he has been having moderate to severe chronic pain of his left leg.  The patient has a history of a left leg above-the-knee amputation and has had chronic pain ever since.  He reports that his prosthesis has been working well and he has not been having any problems with skin ulcers or sores on his stump.    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)    Post-traumatic Stress Disorder    Myalgia And Myositis    Vitamin D Deficiency    Benign Essential Hypertension    Arthralgias In Multiple Sites    Back Pain    Insomnia    Esophageal Reflux    Limb Pain    Elevated liver enzymes    Chronic lower limb pain    Chronic pain of left lower extremity    Intellectual disability    History of closed head injury    Pleural effusion    Sepsis (H)    Hyponatremia    Empyema, right (H)    TY (acute kidney injury)    Microcytic anemia    Generalized muscle weakness    Anemia due to blood loss, acute    Right-sided chest pain    Type 2 diabetes mellitus with microalbuminuria, without long-term current use of insulin (H)    Phantom limb pain (H)    Urinary incontinence    Resistant hypertension    Sinus tachycardia    Thrombocytopenia    Traumatic amputation of right leg (H)     Current Outpatient Medications   Medication Sig Dispense Refill    acetaminophen (TYLENOL) 500 MG tablet Take 1 tablet (500 mg) by mouth every 6 hours as needed for pain. 100 tablet 11     "amLODIPine (NORVASC) 10 MG tablet Take 1 tablet (10 mg) by mouth daily. 90 tablet 3    Cholecalciferol (VITAMIN D3) 50 MCG (2000 UT) CAPS Take 1 capsule by mouth daily. 90 capsule 3    diclofenac (VOLTAREN) 1 % topical gel Apply 4 g topically 4 times daily. 350 g 4    ezetimibe (ZETIA) 10 MG tablet Take 1 tablet (10 mg) by mouth daily. 90 tablet 3    gabapentin (NEURONTIN) 300 MG capsule TAKE 1 CAPSULE BY MOUTH TWICE DAILY 180 capsule 3    losartan (COZAAR) 100 MG tablet Take 1 tablet (100 mg) by mouth daily. 90 tablet 3    metFORMIN (GLUCOPHAGE) 500 MG tablet Take 1 tablet (500 mg) by mouth 2 times daily (with meals). 180 tablet 3    propranolol ER (INDERAL LA) 80 MG 24 hr capsule Take 1 capsule (80 mg) by mouth daily. 90 capsule 3     History   Smoking Status    Former    Types: Cigarettes    Quit date: 1/1/2015   Smokeless Tobacco    Former       OBJECTICE: /81 (BP Location: Left arm, Patient Position: Sitting, Cuff Size: Adult Regular)   Pulse 120   Temp 97.8  F (36.6  C) (Oral)   Resp 16   Ht 1.568 m (5' 1.75\")   Wt 70.8 kg (156 lb 0.6 oz)   SpO2 98%   BMI 28.77 kg/m       Recent Results (from the past 24 hours)   Comprehensive metabolic panel (BMP + Alb, Alk Phos, ALT, AST, Total. Bili, TP)    Collection Time: 04/30/25 12:16 PM   Result Value Ref Range    Sodium 136 135 - 145 mmol/L    Potassium 5.1 3.4 - 5.3 mmol/L    Carbon Dioxide (CO2) 26 22 - 29 mmol/L    Anion Gap 10 7 - 15 mmol/L    Urea Nitrogen 15.2 8.0 - 23.0 mg/dL    Creatinine 0.88 0.67 - 1.17 mg/dL    GFR Estimate >90 >60 mL/min/1.73m2    Calcium 9.9 8.8 - 10.4 mg/dL    Chloride 100 98 - 107 mmol/L    Glucose 111 (H) 70 - 99 mg/dL    Alkaline Phosphatase 92 40 - 150 U/L    AST 34 0 - 45 U/L    ALT 28 0 - 70 U/L    Protein Total 8.1 6.4 - 8.3 g/dL    Albumin 4.5 3.5 - 5.2 g/dL    Bilirubin Total 0.7 <=1.2 mg/dL   Hemoglobin A1c    Collection Time: 04/30/25 12:16 PM   Result Value Ref Range    Estimated Average Glucose 146 (H) <117 " mg/dL    Hemoglobin A1C 6.7 (H) 0.0 - 5.6 %        CV-regular tachycardia with no murmur   RESP-lungs clear to auscultation   ABDOMINAL-soft, nontender to palpation   EXTREM-no pitting edema of the right ankle, left leg above the knee amputation.         Signed Electronically by: Wagner Lamar MD

## 2025-05-01 ENCOUNTER — MEDICAL CORRESPONDENCE (OUTPATIENT)
Dept: HEALTH INFORMATION MANAGEMENT | Facility: CLINIC | Age: 65
End: 2025-05-01
Payer: COMMERCIAL

## 2025-05-06 ENCOUNTER — PATIENT OUTREACH (OUTPATIENT)
Dept: GERIATRIC MEDICINE | Facility: CLINIC | Age: 65
End: 2025-05-06
Payer: COMMERCIAL

## 2025-05-06 NOTE — PROGRESS NOTES
[facility] Care Coordination Contact    Received after visit chart from care coordinator.  Completed following tasks: Mailed copy of support plan to member, Mailed [place] Cho[place] signature sheet pages 3-4, Mailed Safe Medication Disposal , Mailed Transition of Care Member Handout, and Updated services in Database.  , Provider Signature - No Support Plan Shared:  Member indicates that they do not want their support plan shared with any [facility] providers.    [facility]:  Emailed required CFSS documents to [facility].  Mailed member supplemental summary chart and assessment summary letter.     Sandy Nguyễn  Care Management Specialist  [facility]  [phone]

## 2025-05-06 NOTE — LETTER
May 6, 2025       SCHUYLER SEGOVIA PANCHITO  [address] [place] [facility] [place] [address]      Dear Schuyler,    At [facility], we re dedicated to improving your health and wellness. Enclosed is the Support Plan developed with you on 4/21/2025. Please review the Support Plan carefully.    As a reminder, during your visit we talked about:   Ways to manage your physical and mental health   Using health care to maintain and improve your health    Your preventive care needs      Remember to contact your care coordinator if you:   Are hospitalized or plan to be hospitalized    Have a fall     Have a change in your physical or mental health   Need help finding support or services    If you have questions or don t agree with your Support Plan, call me at [phone]. You can also call me if your needs change. TTY users call the [place] Relay at 194 or 1-135.140.9604 (fclqpy-yz-xuoejm relay service).    Sincerely,       Cristine Gill RN, BSN, PHN  [phone]  [email or web address]                [id number] accepted     (06/2024)                [address], [place] [address]  [phone]  fax [phone]  [facility]           Patient notified.  Patient understood instructions.

## 2025-05-14 ENCOUNTER — MEDICAL CORRESPONDENCE (OUTPATIENT)
Dept: HEALTH INFORMATION MANAGEMENT | Facility: CLINIC | Age: 65
End: 2025-05-14
Payer: COMMERCIAL

## 2025-05-20 ENCOUNTER — TELEPHONE (OUTPATIENT)
Dept: FAMILY MEDICINE | Facility: CLINIC | Age: 65
End: 2025-05-20
Payer: COMMERCIAL

## 2025-05-20 NOTE — TELEPHONE ENCOUNTER
Forms/Letter Request    Type of form/letter: DME (wheelchair, hospital bed)    Type of DME requested: DME MANUAL WHEELCHAIR    Do we have the form/letter: Yes: incoming fax bin for      Who is the form from? [employer]  (if other please explain)    Where did/will the form come from? form was faxed in    When is form/letter needed by: ASAP    How would you like the form/letter returned: Fax : [phone]

## 2025-05-22 ENCOUNTER — MEDICAL CORRESPONDENCE (OUTPATIENT)
Dept: HEALTH INFORMATION MANAGEMENT | Facility: CLINIC | Age: 65
End: 2025-05-22

## 2025-06-10 ENCOUNTER — PATIENT OUTREACH (OUTPATIENT)
Dept: GERIATRIC MEDICINE | Facility: CLINIC | Age: 65
End: 2025-06-10
Payer: COMMERCIAL

## 2025-06-10 NOTE — PROGRESS NOTES
[facility] Care Coordination Contact    CHW spoke with member's daughter to remind member to schedule Dental and Eye exam.   She said that she will schedule. CHW provided contact information for further assistant.    COURTNEY Dalal  [facility]  [phone]

## 2025-07-28 ENCOUNTER — PATIENT OUTREACH (OUTPATIENT)
Dept: CARE COORDINATION | Facility: CLINIC | Age: 65
End: 2025-07-28
Payer: COMMERCIAL

## 2025-08-06 ENCOUNTER — PATIENT OUTREACH (OUTPATIENT)
Dept: GERIATRIC MEDICINE | Facility: CLINIC | Age: 65
End: 2025-08-06
Payer: COMMERCIAL